# Patient Record
Sex: FEMALE | Race: WHITE | NOT HISPANIC OR LATINO | Employment: FULL TIME | URBAN - METROPOLITAN AREA
[De-identification: names, ages, dates, MRNs, and addresses within clinical notes are randomized per-mention and may not be internally consistent; named-entity substitution may affect disease eponyms.]

---

## 2017-01-31 ENCOUNTER — OFFICE VISIT (OUTPATIENT)
Dept: FAMILY MEDICINE CLINIC | Facility: CLINIC | Age: 42
End: 2017-01-31

## 2017-01-31 VITALS
SYSTOLIC BLOOD PRESSURE: 107 MMHG | WEIGHT: 149 LBS | BODY MASS INDEX: 28.13 KG/M2 | OXYGEN SATURATION: 99 % | TEMPERATURE: 98.7 F | RESPIRATION RATE: 16 BRPM | HEART RATE: 79 BPM | DIASTOLIC BLOOD PRESSURE: 68 MMHG | HEIGHT: 61 IN

## 2017-01-31 DIAGNOSIS — J32.0 MAXILLARY SINUSITIS, UNSPECIFIED CHRONICITY: ICD-10-CM

## 2017-01-31 DIAGNOSIS — R05.9 COUGH: ICD-10-CM

## 2017-01-31 DIAGNOSIS — J06.9 ACUTE URI: Primary | ICD-10-CM

## 2017-01-31 PROCEDURE — 99213 OFFICE O/P EST LOW 20 MIN: CPT | Performed by: FAMILY MEDICINE

## 2017-01-31 RX ORDER — ALCLOMETASONE DIPROPIONATE 0.5 MG/G
CREAM TOPICAL
Refills: 2 | COMMUNITY
Start: 2016-12-15 | End: 2018-08-08

## 2017-01-31 RX ORDER — AMOXICILLIN 500 MG/1
500 CAPSULE ORAL 3 TIMES DAILY
Qty: 30 CAPSULE | Refills: 0 | Status: SHIPPED | OUTPATIENT
Start: 2017-01-31 | End: 2017-02-10

## 2017-06-15 DIAGNOSIS — I10 ESSENTIAL HYPERTENSION: ICD-10-CM

## 2017-06-15 RX ORDER — LOSARTAN POTASSIUM 100 MG/1
TABLET ORAL
Qty: 90 TABLET | Refills: 3 | OUTPATIENT
Start: 2017-06-15

## 2017-07-17 ENCOUNTER — OFFICE VISIT (OUTPATIENT)
Dept: FAMILY MEDICINE CLINIC | Facility: CLINIC | Age: 42
End: 2017-07-17

## 2017-07-17 VITALS
HEART RATE: 84 BPM | DIASTOLIC BLOOD PRESSURE: 72 MMHG | SYSTOLIC BLOOD PRESSURE: 105 MMHG | HEIGHT: 62 IN | BODY MASS INDEX: 29.44 KG/M2 | TEMPERATURE: 98.5 F | RESPIRATION RATE: 16 BRPM | WEIGHT: 160 LBS

## 2017-07-17 DIAGNOSIS — F41.9 ANXIETY: Primary | ICD-10-CM

## 2017-07-17 DIAGNOSIS — I10 ESSENTIAL HYPERTENSION: ICD-10-CM

## 2017-07-17 PROCEDURE — 99213 OFFICE O/P EST LOW 20 MIN: CPT | Performed by: FAMILY MEDICINE

## 2017-07-17 RX ORDER — DULOXETIN HYDROCHLORIDE 30 MG/1
30 CAPSULE, DELAYED RELEASE ORAL DAILY
Qty: 90 CAPSULE | Refills: 3 | Status: SHIPPED | OUTPATIENT
Start: 2017-07-17 | End: 2018-07-20 | Stop reason: SDUPTHER

## 2017-07-17 RX ORDER — BUSPIRONE HYDROCHLORIDE 15 MG/1
15 TABLET ORAL 3 TIMES DAILY
COMMUNITY
End: 2017-07-17 | Stop reason: SDUPTHER

## 2017-07-17 RX ORDER — LOSARTAN POTASSIUM 100 MG/1
100 TABLET ORAL DAILY
Qty: 90 TABLET | Refills: 3 | Status: SHIPPED | OUTPATIENT
Start: 2017-07-17 | End: 2018-08-08 | Stop reason: SDUPTHER

## 2017-07-17 RX ORDER — BUSPIRONE HYDROCHLORIDE 15 MG/1
15 TABLET ORAL 3 TIMES DAILY
Qty: 270 TABLET | Refills: 3 | Status: SHIPPED | OUTPATIENT
Start: 2017-07-17 | End: 2018-08-08 | Stop reason: SDUPTHER

## 2017-07-17 NOTE — PROGRESS NOTES
"Subjective   Roxy Calvert is a 41 y.o. female.     History of Present Illness     Chief Complaint:   Chief Complaint   Patient presents with   • Hypertension     MED REFILL - PHQ9 DONE    • Anxiety       Roxy Calvert 41 y.o. female who presents today for Medical Management of the below listed issues and medication refills.  she has a history of   Patient Active Problem List   Diagnosis   • Hypertension   • Anxiety   .  Since the last visit, she has overall felt well.  she has been compliant with   Current Outpatient Prescriptions:   •  busPIRone (BUSPAR) 15 MG tablet, Take 1 tablet by mouth 3 (Three) Times a Day., Disp: 270 tablet, Rfl: 3  •  DULoxetine (CYMBALTA) 30 MG capsule, Take 1 capsule by mouth Daily., Disp: 90 capsule, Rfl: 3  •  losartan (COZAAR) 100 MG tablet, Take 1 tablet by mouth Daily., Disp: 90 tablet, Rfl: 3  •  alclomethasone (ACLOVATE) 0.05 % cream, APPLY TO AFFECTED AREAS OF THE FACE TWICE DAILY FOR 2-3 DAYS, REPEAT AS NEEDED FOR FLARES, Disp: , Rfl: 2  •  EPIPEN 2-AMANDA 0.3 MG/0.3ML solution auto-injector injection, , Disp: , Rfl: .  she denies medication side effects.    All of the chronic condition(s) listed above are stable w/o issues.    /72  Pulse 84  Temp 98.5 °F (36.9 °C) (Oral)   Resp 16  Ht 62\" (157.5 cm)  Wt 160 lb (72.6 kg)  BMI 29.26 kg/m2    Results for orders placed or performed in visit on 10/03/16   PapIG, HPV, Rfx 16 / 18   Result Value Ref Range    Diagnosis Comment     Specimen adequacy: Comment     Clinician Provided ICD-10: Comment     Performed by: Comment     . .     Note: Comment     Method: Comment     HPV, high-risk Negative Negative         The following portions of the patient's history were reviewed and updated as appropriate: allergies, current medications, past family history, past medical history, past social history, past surgical history and problem list.    Review of Systems   Constitutional: Negative for activity change, chills, fatigue and " fever.   Respiratory: Negative for cough and chest tightness.    Cardiovascular: Negative for chest pain and palpitations.   Gastrointestinal: Negative for abdominal pain and nausea.   Endocrine: Negative for cold intolerance.   Psychiatric/Behavioral: Negative for behavioral problems and dysphoric mood.       Objective   Physical Exam   Constitutional: She appears well-developed and well-nourished.   Neck: Neck supple. No thyromegaly present.   Cardiovascular: Normal rate and regular rhythm.    No murmur heard.  Pulmonary/Chest: Effort normal and breath sounds normal.   Abdominal: Bowel sounds are normal.   Psychiatric: She has a normal mood and affect. Her behavior is normal.   Nursing note and vitals reviewed.      Assessment/Plan   Roxy was seen today for hypertension and anxiety.    Diagnoses and all orders for this visit:    Anxiety  -     busPIRone (BUSPAR) 15 MG tablet; Take 1 tablet by mouth 3 (Three) Times a Day.  -     DULoxetine (CYMBALTA) 30 MG capsule; Take 1 capsule by mouth Daily.    Essential hypertension  -     losartan (COZAAR) 100 MG tablet; Take 1 tablet by mouth Daily.  -     Comprehensive metabolic panel  -     Lipid panel  -     CBC and Differential  -     TSH

## 2017-10-01 LAB
ALBUMIN SERPL-MCNC: 4.4 G/DL (ref 3.5–5.5)
ALBUMIN/GLOB SERPL: 2 {RATIO} (ref 1.2–2.2)
ALP SERPL-CCNC: 70 IU/L (ref 39–117)
ALT SERPL-CCNC: 13 IU/L (ref 0–32)
AST SERPL-CCNC: 15 IU/L (ref 0–40)
BASOPHILS # BLD AUTO: 0 X10E3/UL (ref 0–0.2)
BASOPHILS NFR BLD AUTO: 1 %
BILIRUB SERPL-MCNC: 0.5 MG/DL (ref 0–1.2)
BUN SERPL-MCNC: 10 MG/DL (ref 6–24)
BUN/CREAT SERPL: 14 (ref 9–23)
CALCIUM SERPL-MCNC: 9.4 MG/DL (ref 8.7–10.2)
CHLORIDE SERPL-SCNC: 98 MMOL/L (ref 96–106)
CHOLEST SERPL-MCNC: 183 MG/DL (ref 100–199)
CO2 SERPL-SCNC: 27 MMOL/L (ref 18–29)
CREAT SERPL-MCNC: 0.7 MG/DL (ref 0.57–1)
EOSINOPHIL # BLD AUTO: 0.2 X10E3/UL (ref 0–0.4)
EOSINOPHIL NFR BLD AUTO: 4 %
ERYTHROCYTE [DISTWIDTH] IN BLOOD BY AUTOMATED COUNT: 13.1 % (ref 12.3–15.4)
GLOBULIN SER CALC-MCNC: 2.2 G/DL (ref 1.5–4.5)
GLUCOSE SERPL-MCNC: 89 MG/DL (ref 65–99)
HCT VFR BLD AUTO: 40.4 % (ref 34–46.6)
HDLC SERPL-MCNC: 65 MG/DL
HGB BLD-MCNC: 13.1 G/DL (ref 11.1–15.9)
IMM GRANULOCYTES # BLD: 0 X10E3/UL (ref 0–0.1)
IMM GRANULOCYTES NFR BLD: 0 %
LDLC SERPL CALC-MCNC: 99 MG/DL (ref 0–99)
LYMPHOCYTES # BLD AUTO: 2.6 X10E3/UL (ref 0.7–3.1)
LYMPHOCYTES NFR BLD AUTO: 43 %
MCH RBC QN AUTO: 29.3 PG (ref 26.6–33)
MCHC RBC AUTO-ENTMCNC: 32.4 G/DL (ref 31.5–35.7)
MCV RBC AUTO: 90 FL (ref 79–97)
MONOCYTES # BLD AUTO: 0.4 X10E3/UL (ref 0.1–0.9)
MONOCYTES NFR BLD AUTO: 8 %
NEUTROPHILS # BLD AUTO: 2.6 X10E3/UL (ref 1.4–7)
NEUTROPHILS NFR BLD AUTO: 44 %
PLATELET # BLD AUTO: 363 X10E3/UL (ref 150–379)
POTASSIUM SERPL-SCNC: 4 MMOL/L (ref 3.5–5.2)
PROT SERPL-MCNC: 6.6 G/DL (ref 6–8.5)
RBC # BLD AUTO: 4.47 X10E6/UL (ref 3.77–5.28)
SODIUM SERPL-SCNC: 138 MMOL/L (ref 134–144)
TRIGL SERPL-MCNC: 94 MG/DL (ref 0–149)
TSH SERPL DL<=0.005 MIU/L-ACNC: 1.45 UIU/ML (ref 0.45–4.5)
VLDLC SERPL CALC-MCNC: 19 MG/DL (ref 5–40)
WBC # BLD AUTO: 5.9 X10E3/UL (ref 3.4–10.8)

## 2017-10-03 ENCOUNTER — OFFICE VISIT (OUTPATIENT)
Dept: FAMILY MEDICINE CLINIC | Facility: CLINIC | Age: 42
End: 2017-10-03

## 2017-10-03 VITALS
WEIGHT: 163 LBS | BODY MASS INDEX: 30 KG/M2 | HEIGHT: 62 IN | SYSTOLIC BLOOD PRESSURE: 111 MMHG | TEMPERATURE: 98.3 F | HEART RATE: 98 BPM | DIASTOLIC BLOOD PRESSURE: 66 MMHG | RESPIRATION RATE: 18 BRPM

## 2017-10-03 DIAGNOSIS — Z00.00 ANNUAL PHYSICAL EXAM: Primary | ICD-10-CM

## 2017-10-03 PROCEDURE — 99396 PREV VISIT EST AGE 40-64: CPT | Performed by: FAMILY MEDICINE

## 2017-10-03 NOTE — PROGRESS NOTES
"Subjective   Roxy Calvert is a 41 y.o. female.     CC: Annual Exam    History of Present Illness     Roxy Calvert 41 y.o. female who presents for an Annual Wellness Visit.  she has a history of   Patient Active Problem List   Diagnosis   • Hypertension   • Anxiety   .  she has been feeling well.  Labs results discussed in detail with the patient.  Plan to update vaccines if needed today.  I  reviewed health maintenance with her as part of my preventative care plan.    Health Habits:  Dental Exam. up to date  Eye Exam. up to date  Exercise: 3 times/week.  Current exercise activities include: eliptical      The following portions of the patient's history were reviewed and updated as appropriate: allergies, current medications, past family history, past medical history, past social history, past surgical history and problem list.    Review of Systems   Constitutional: Negative for appetite change, fever and unexpected weight change.   HENT: Negative for ear pain, facial swelling and sore throat.    Eyes: Negative for pain and visual disturbance.   Respiratory: Negative for chest tightness, shortness of breath and wheezing.    Cardiovascular: Negative for chest pain and palpitations.   Gastrointestinal: Negative for abdominal pain and blood in stool.   Endocrine: Negative.    Genitourinary: Negative for difficulty urinating and hematuria.   Musculoskeletal: Negative for joint swelling.   Neurological: Negative for tremors, seizures and syncope.   Hematological: Negative for adenopathy.   Psychiatric/Behavioral: Negative.      /66  Pulse 98  Temp 98.3 °F (36.8 °C) (Oral)   Resp 18  Ht 61.5\" (156.2 cm)  Wt 163 lb (73.9 kg)  BMI 30.3 kg/m2    Objective   Physical Exam   Constitutional: She is oriented to person, place, and time. She appears well-developed and well-nourished.   HENT:   Head: Normocephalic and atraumatic.   Right Ear: External ear normal.   Left Ear: External ear normal.   Mouth/Throat: " No oropharyngeal exudate.   Eyes: Conjunctivae and EOM are normal. Pupils are equal, round, and reactive to light. No scleral icterus.   Neck: Normal range of motion. Neck supple. No thyromegaly present.   Cardiovascular: Normal rate and regular rhythm.  Exam reveals no gallop.    No murmur heard.  Pulmonary/Chest: Effort normal and breath sounds normal. She has no wheezes. She has no rales.   Abdominal: Soft. Bowel sounds are normal. She exhibits no distension and no mass. There is no tenderness. No hernia.   Musculoskeletal: Normal range of motion. She exhibits no edema, tenderness or deformity.   Lymphadenopathy:     She has no cervical adenopathy.   Neurological: She is alert and oriented to person, place, and time. She has normal reflexes.   Skin: Skin is warm and dry. No rash noted.   Psychiatric: She has a normal mood and affect. Her behavior is normal.   Nursing note and vitals reviewed.  Labs reviewed with pt today during visit. All questions answered.  Leanna Swanson NP student, present for exam.    Assessment/Plan   Roxy was seen today for annual exam and lab results.    Diagnoses and all orders for this visit:    Annual physical exam    Diet/exercise discussed.

## 2017-10-30 ENCOUNTER — OFFICE VISIT (OUTPATIENT)
Dept: OBSTETRICS AND GYNECOLOGY | Age: 42
End: 2017-10-30

## 2017-10-30 VITALS
SYSTOLIC BLOOD PRESSURE: 132 MMHG | BODY MASS INDEX: 27.49 KG/M2 | DIASTOLIC BLOOD PRESSURE: 82 MMHG | WEIGHT: 165 LBS | HEIGHT: 65 IN

## 2017-10-30 DIAGNOSIS — Z01.419 ENCOUNTER FOR GYNECOLOGICAL EXAMINATION WITHOUT ABNORMAL FINDING: Primary | ICD-10-CM

## 2017-10-30 PROCEDURE — 99396 PREV VISIT EST AGE 40-64: CPT | Performed by: OBSTETRICS & GYNECOLOGY

## 2017-10-30 NOTE — PROGRESS NOTES
"Routine Annual Visit    10/30/2017    Patient: Roxy Calvert          MR#:[id number]      Chief Complaint   Patient presents with   • Annual Exam     PT HERE FOR ROUTINE AE, R/S MG DUE TO KATI BEING OUT. SHE IS WELL WITH NO COMPLAINTS. LAST PAP  (NEG AND NEG HPV).       History of Present Illness    41 y.o. female  who presents for annual exam.   Doing well  No menses with mirena and loves it- due out 2019- plans to get another  Kids 8,11 Berta and storm trooper for Halloween  Will need to sched mammo as tech out today  UTD on pap          No LMP recorded. Patient has had an implant.  Obstetric History:  OB History      Para Term  AB Living    3 2 1 1  2    SAB TAB Ectopic Multiple Live Births        2         Menstrual History:     No LMP recorded. Patient has had an implant.       Sexual History:       ________________________________________  Patient Active Problem List   Diagnosis   • Hypertension   • Anxiety       Past Medical History:   Diagnosis Date   • Allergic rhinitis    • Anxiety    • H/O bone density study never   • H/O complete eye exam    • Hypertension    • Hypertension in pregnancy    • Vaginal delivery     08 DR TAN BABY \"KYDWYN\" 6.12       Past Surgical History:   Procedure Laterality Date   •  SECTION  2006   • EAR TUBES     • MAMMO BILATERAL      age 36 scheduled 2017   • PAP SMEAR  2016    gyn dr carli tan due 2017   • TYMPANOPLASTY     • WISDOM TOOTH EXTRACTION         History   Smoking Status   • Never Smoker   Smokeless Tobacco   • Never Used       has a current medication list which includes the following prescription(s): alclomethasone, buspirone, duloxetine, epipen 2-kate, and losartan.  ________________________________________    Current contraception: IUD  History of abnormal Pap smear: no  Family history of Breast cancer: no  Family history of uterine or ovarian cancer: no  Family History of colon cancer/colon " "polyps: no  History of abnormal mammogram: no      The following portions of the patient's history were reviewed and updated as appropriate: allergies, current medications, past family history, past medical history, past social history, past surgical history and problem list.    Review of Systems    Pertinent items are noted in HPI.     Objective   Physical Exam    /82  Ht 65\" (165.1 cm)  Wt 165 lb (74.8 kg)  LMP Comment: IUD  BMI 27.46 kg/m2   BP Readings from Last 3 Encounters:   10/30/17 132/82   10/03/17 111/66   07/17/17 105/72      Wt Readings from Last 3 Encounters:   10/30/17 165 lb (74.8 kg)   10/03/17 163 lb (73.9 kg)   07/17/17 160 lb (72.6 kg)      BMI: Estimated body mass index is 27.46 kg/(m^2) as calculated from the following:    Height as of this encounter: 65\" (165.1 cm).    Weight as of this encounter: 165 lb (74.8 kg).      General:   alert, appears stated age and cooperative   Abdomen: soft, non-tender, without masses or organomegaly   Breast: inspection negative, no nipple discharge or bleeding, no masses or nodularity palpable   Vulva: normal   Vagina: normal mucosa   Cervix: no cervical motion tenderness and no lesions   Uterus: normal size, mobile or non-tender   Adnexa: no mass, fullness, tenderness     Assessment:    1. Normal annual exam   Assessment     ICD-10-CM ICD-9-CM   1. Encounter for gynecological examination without abnormal finding Z01.419 V72.31     Plan:    Plan     [x]  Mammogram request made  []  PAP done  []  Labs:   []  GC/Chl/TV  []  DEXA scan   []  Referral for colonoscopy:       Roxy was seen today for annual exam.    Diagnoses and all orders for this visit:    Encounter for gynecological examination without abnormal finding          Counseling:  --Nutrition: Stressed importance of moderation and caloric balance, stressed fresh fruit and vegetables  --Exercise: Stressed the importance of regular exercise. 3-5 times weekly   - Discussed screening mammogram " recommendations.   --Discussed benefits of screening colonoscopy- age 50 unless FH  --Discussed pap smear screening recommendations

## 2017-10-31 ENCOUNTER — OFFICE VISIT (OUTPATIENT)
Dept: FAMILY MEDICINE CLINIC | Facility: CLINIC | Age: 42
End: 2017-10-31

## 2017-10-31 VITALS
OXYGEN SATURATION: 97 % | HEIGHT: 65 IN | SYSTOLIC BLOOD PRESSURE: 127 MMHG | RESPIRATION RATE: 18 BRPM | WEIGHT: 166 LBS | DIASTOLIC BLOOD PRESSURE: 83 MMHG | BODY MASS INDEX: 27.66 KG/M2 | HEART RATE: 75 BPM | TEMPERATURE: 98.7 F

## 2017-10-31 DIAGNOSIS — H60.502 ACUTE OTITIS EXTERNA OF LEFT EAR, UNSPECIFIED TYPE: Primary | ICD-10-CM

## 2017-10-31 PROCEDURE — 99213 OFFICE O/P EST LOW 20 MIN: CPT | Performed by: NURSE PRACTITIONER

## 2017-10-31 RX ORDER — ACETIC ACID 20.65 MG/ML
3 SOLUTION AURICULAR (OTIC) 3 TIMES DAILY
Qty: 15 ML | Refills: 0 | Status: SHIPPED | OUTPATIENT
Start: 2017-10-31 | End: 2018-10-30

## 2017-10-31 NOTE — PROGRESS NOTES
Subjective   Roxy Calvert is a 41 y.o. female.     History of Present Illness   Roxy Calvert 41 y.o. female who presents for evaluation of ear pressure. Symptoms include ear pressure and foul smelling drainage from left ear.  Onset of symptoms was a few days ago, gradually worsening since that time. Patient denies shortness of breath, wheezing, upper respiratory congestion, fever, ear drainage, vertigo, chills, sinus pain.   Evaluation to date: none Treatment to date:  none.         The following portions of the patient's history were reviewed and updated as appropriate: allergies, current medications, past family history, past medical history, past social history, past surgical history and problem list.    Review of Systems   Constitutional: Negative for chills and fever.   HENT: Positive for ear pain. Negative for congestion, ear discharge, hearing loss, postnasal drip, rhinorrhea, sinus pressure and sore throat.    Respiratory: Negative for cough and shortness of breath.        Objective   Physical Exam   Constitutional: She is oriented to person, place, and time. She appears well-developed and well-nourished.   HENT:   Right Ear: External ear and ear canal normal.   Left Ear: External ear normal. There is drainage.   Right cerumen impaction.  Left canal with moderate amount of purulent drainage.  Tenderness to palpation of left tragus.    Neck: Carotid bruit is not present. No thyroid mass present.   Pulmonary/Chest: Effort normal.   Neurological: She is alert and oriented to person, place, and time.   Psychiatric: She has a normal mood and affect. Judgment normal.   Nursing note and vitals reviewed.      Assessment/Plan   Roxy was seen today for earache.    Diagnoses and all orders for this visit:    Acute otitis externa of left ear, unspecified type  -     acetic acid (VOSOL) 2 % otic solution; Administer 3 drops into the left ear 3 (Three) Times a Day.  -     Anaerobic & Aerobic Culture ([facility]  Only) - Swab, Ear, Left          Patient to use OTC wax softening drops to right ear.  Will use acetic acid to left ear.   Culture obtained from left ear.

## 2017-11-03 ENCOUNTER — TELEPHONE (OUTPATIENT)
Dept: FAMILY MEDICINE CLINIC | Facility: CLINIC | Age: 42
End: 2017-11-03

## 2017-11-04 LAB
BACTERIA SPEC RESP CULT: ABNORMAL
BACTERIA SPEC RESP CULT: ABNORMAL
OTHER ANTIBIOTIC SUSC ISLT: ABNORMAL
REQUEST PROBLEM: NORMAL

## 2017-11-06 ENCOUNTER — TELEPHONE (OUTPATIENT)
Dept: FAMILY MEDICINE CLINIC | Facility: CLINIC | Age: 42
End: 2017-11-06

## 2017-11-06 NOTE — TELEPHONE ENCOUNTER
----- Message from MAGDA Orozco sent at 11/6/2017  5:39 PM EST -----  Based on culture results, changed acetic acid to cipro drops x 7 days.  Patient to stop acetic acid.

## 2018-07-20 DIAGNOSIS — I10 ESSENTIAL HYPERTENSION: ICD-10-CM

## 2018-07-20 DIAGNOSIS — F41.9 ANXIETY: ICD-10-CM

## 2018-07-20 RX ORDER — DULOXETIN HYDROCHLORIDE 30 MG/1
30 CAPSULE, DELAYED RELEASE ORAL DAILY
Qty: 90 CAPSULE | Refills: 3 | OUTPATIENT
Start: 2018-07-20

## 2018-07-20 RX ORDER — DULOXETIN HYDROCHLORIDE 30 MG/1
30 CAPSULE, DELAYED RELEASE ORAL DAILY
Qty: 90 CAPSULE | Refills: 0 | Status: SHIPPED | OUTPATIENT
Start: 2018-07-20 | End: 2018-08-08 | Stop reason: SDUPTHER

## 2018-07-20 RX ORDER — LOSARTAN POTASSIUM 100 MG/1
100 TABLET ORAL DAILY
Qty: 90 TABLET | Refills: 3 | OUTPATIENT
Start: 2018-07-20

## 2018-08-08 ENCOUNTER — OFFICE VISIT (OUTPATIENT)
Dept: FAMILY MEDICINE CLINIC | Facility: CLINIC | Age: 43
End: 2018-08-08

## 2018-08-08 VITALS
DIASTOLIC BLOOD PRESSURE: 86 MMHG | SYSTOLIC BLOOD PRESSURE: 133 MMHG | WEIGHT: 177 LBS | BODY MASS INDEX: 32.57 KG/M2 | HEIGHT: 62 IN | RESPIRATION RATE: 16 BRPM | HEART RATE: 76 BPM | TEMPERATURE: 98.6 F

## 2018-08-08 DIAGNOSIS — I10 ESSENTIAL HYPERTENSION: Primary | ICD-10-CM

## 2018-08-08 DIAGNOSIS — F41.9 ANXIETY: ICD-10-CM

## 2018-08-08 PROCEDURE — 99213 OFFICE O/P EST LOW 20 MIN: CPT | Performed by: FAMILY MEDICINE

## 2018-08-08 RX ORDER — DULOXETIN HYDROCHLORIDE 30 MG/1
30 CAPSULE, DELAYED RELEASE ORAL DAILY
Qty: 90 CAPSULE | Refills: 3 | Status: SHIPPED | OUTPATIENT
Start: 2018-08-08 | End: 2019-07-29

## 2018-08-08 RX ORDER — BUSPIRONE HYDROCHLORIDE 15 MG/1
15 TABLET ORAL 3 TIMES DAILY
Qty: 270 TABLET | Refills: 3 | Status: SHIPPED | OUTPATIENT
Start: 2018-08-08 | End: 2019-07-15 | Stop reason: SDUPTHER

## 2018-08-08 RX ORDER — LOSARTAN POTASSIUM 100 MG/1
100 TABLET ORAL DAILY
Qty: 90 TABLET | Refills: 3 | Status: SHIPPED | OUTPATIENT
Start: 2018-08-08 | End: 2019-07-18

## 2018-08-08 NOTE — PROGRESS NOTES
"Subjective   Roxy Calvert is a 42 y.o. female.     History of Present Illness     Chief Complaint:   Chief Complaint   Patient presents with   • Hypertension     med refill    • Anxiety       Roxy Calvert 42 y.o. female who presents today for Medical Management of the below listed issues and medication refills.  she has a problem list of   Patient Active Problem List   Diagnosis   • Hypertension   • Anxiety   .  Since the last visit, she has overall felt well.  she has been compliant with   Current Outpatient Prescriptions:   •  acetic acid (VOSOL) 2 % otic solution, Administer 3 drops into the left ear 3 (Three) Times a Day., Disp: 15 mL, Rfl: 0  •  busPIRone (BUSPAR) 15 MG tablet, Take 1 tablet by mouth 3 (Three) Times a Day., Disp: 270 tablet, Rfl: 3  •  DULoxetine (CYMBALTA) 30 MG capsule, Take 1 capsule by mouth Daily., Disp: 90 capsule, Rfl: 3  •  EPIPEN 2-AMANDA 0.3 MG/0.3ML solution auto-injector injection, , Disp: , Rfl:   •  losartan (COZAAR) 100 MG tablet, Take 1 tablet by mouth Daily., Disp: 90 tablet, Rfl: 3.  she denies medication side effects.    All of the chronic condition(s) listed above are stable w/o issues.    /86   Pulse 76   Temp 98.6 °F (37 °C) (Oral)   Resp 16   Ht 156.2 cm (61.5\")   Wt 80.3 kg (177 lb)   BMI 32.90 kg/m²     Results for orders placed or performed in visit on 10/31/17   Throat / Upper Respiratory Culture   Result Value Ref Range    Upper Respiratory Culture Final report (A)     Result 1 Staphylococcus aureus (A)     Susceptibility Testing Comment    Request Problem   Result Value Ref Range    Request Problem Final report            The following portions of the patient's history were reviewed and updated as appropriate: allergies, current medications, past family history, past medical history, past social history, past surgical history and problem list.    Review of Systems   Constitutional: Negative for activity change, chills, fatigue and fever. "   Respiratory: Negative for cough and chest tightness.    Cardiovascular: Negative for chest pain and palpitations.   Gastrointestinal: Negative for abdominal pain and nausea.   Endocrine: Negative for cold intolerance.   Psychiatric/Behavioral: Negative for behavioral problems and dysphoric mood.       Objective   Physical Exam   Constitutional: She appears well-developed and well-nourished.   Neck: Neck supple. No thyromegaly present.   Cardiovascular: Normal rate and regular rhythm.    No murmur heard.  Pulmonary/Chest: Effort normal and breath sounds normal.   Abdominal: Bowel sounds are normal. There is no tenderness.   Neurological: She is alert.   Psychiatric: She has a normal mood and affect. Her behavior is normal.   Nursing note and vitals reviewed.      Assessment/Plan   Roxy was seen today for hypertension and anxiety.    Diagnoses and all orders for this visit:    Essential hypertension  -     losartan (COZAAR) 100 MG tablet; Take 1 tablet by mouth Daily.  -     Comprehensive metabolic panel  -     Lipid panel  -     CBC and Differential  -     TSH    Anxiety  -     DULoxetine (CYMBALTA) 30 MG capsule; Take 1 capsule by mouth Daily.  -     busPIRone (BUSPAR) 15 MG tablet; Take 1 tablet by mouth 3 (Three) Times a Day.

## 2018-10-21 LAB
ALBUMIN SERPL-MCNC: 4.3 G/DL (ref 3.5–5.5)
ALBUMIN/GLOB SERPL: 1.7 {RATIO} (ref 1.2–2.2)
ALP SERPL-CCNC: 74 IU/L (ref 39–117)
ALT SERPL-CCNC: 18 IU/L (ref 0–32)
AST SERPL-CCNC: 20 IU/L (ref 0–40)
BASOPHILS # BLD AUTO: 0.1 X10E3/UL (ref 0–0.2)
BASOPHILS NFR BLD AUTO: 1 %
BILIRUB SERPL-MCNC: 0.4 MG/DL (ref 0–1.2)
BUN SERPL-MCNC: 9 MG/DL (ref 6–24)
BUN/CREAT SERPL: 13 (ref 9–23)
CALCIUM SERPL-MCNC: 9.2 MG/DL (ref 8.7–10.2)
CHLORIDE SERPL-SCNC: 100 MMOL/L (ref 96–106)
CHOLEST SERPL-MCNC: 172 MG/DL (ref 100–199)
CO2 SERPL-SCNC: 24 MMOL/L (ref 20–29)
CREAT SERPL-MCNC: 0.72 MG/DL (ref 0.57–1)
EOSINOPHIL # BLD AUTO: 0.2 X10E3/UL (ref 0–0.4)
EOSINOPHIL NFR BLD AUTO: 2 %
ERYTHROCYTE [DISTWIDTH] IN BLOOD BY AUTOMATED COUNT: 13.4 % (ref 12.3–15.4)
GLOBULIN SER CALC-MCNC: 2.6 G/DL (ref 1.5–4.5)
GLUCOSE SERPL-MCNC: 92 MG/DL (ref 65–99)
HCT VFR BLD AUTO: 38.7 % (ref 34–46.6)
HDLC SERPL-MCNC: 58 MG/DL
HGB BLD-MCNC: 13.4 G/DL (ref 11.1–15.9)
IMM GRANULOCYTES # BLD: 0 X10E3/UL (ref 0–0.1)
IMM GRANULOCYTES NFR BLD: 0 %
LDLC SERPL CALC-MCNC: 97 MG/DL (ref 0–99)
LYMPHOCYTES # BLD AUTO: 4 X10E3/UL (ref 0.7–3.1)
LYMPHOCYTES NFR BLD AUTO: 47 %
MCH RBC QN AUTO: 29.8 PG (ref 26.6–33)
MCHC RBC AUTO-ENTMCNC: 34.6 G/DL (ref 31.5–35.7)
MCV RBC AUTO: 86 FL (ref 79–97)
MONOCYTES # BLD AUTO: 0.5 X10E3/UL (ref 0.1–0.9)
MONOCYTES NFR BLD AUTO: 7 %
NEUTROPHILS # BLD AUTO: 3.6 X10E3/UL (ref 1.4–7)
NEUTROPHILS NFR BLD AUTO: 43 %
PLATELET # BLD AUTO: 382 X10E3/UL (ref 150–379)
POTASSIUM SERPL-SCNC: 4.3 MMOL/L (ref 3.5–5.2)
PROT SERPL-MCNC: 6.9 G/DL (ref 6–8.5)
RBC # BLD AUTO: 4.5 X10E6/UL (ref 3.77–5.28)
SODIUM SERPL-SCNC: 139 MMOL/L (ref 134–144)
TRIGL SERPL-MCNC: 87 MG/DL (ref 0–149)
TSH SERPL DL<=0.005 MIU/L-ACNC: 1.57 UIU/ML (ref 0.45–4.5)
VLDLC SERPL CALC-MCNC: 17 MG/DL (ref 5–40)
WBC # BLD AUTO: 8.4 X10E3/UL (ref 3.4–10.8)

## 2018-10-30 ENCOUNTER — OFFICE VISIT (OUTPATIENT)
Dept: FAMILY MEDICINE CLINIC | Facility: CLINIC | Age: 43
End: 2018-10-30

## 2018-10-30 VITALS
WEIGHT: 167 LBS | TEMPERATURE: 97.9 F | HEIGHT: 65 IN | DIASTOLIC BLOOD PRESSURE: 78 MMHG | HEART RATE: 74 BPM | RESPIRATION RATE: 16 BRPM | SYSTOLIC BLOOD PRESSURE: 119 MMHG | BODY MASS INDEX: 27.82 KG/M2

## 2018-10-30 DIAGNOSIS — Z00.00 ANNUAL PHYSICAL EXAM: Primary | ICD-10-CM

## 2018-10-30 PROCEDURE — 99396 PREV VISIT EST AGE 40-64: CPT | Performed by: FAMILY MEDICINE

## 2018-10-30 NOTE — PROGRESS NOTES
"Subjective   Roxy Calvert is a 42 y.o. female.     CC: Annual Exam    History of Present Illness     Roxy Calvert 42 y.o. female who presents for an Annual Wellness Visit.  she has a history of   Patient Active Problem List   Diagnosis   • Hypertension   • Anxiety   .  she has been feeling well.  Labs results discussed in detail with the patient.  Plan to update vaccines if needed today.  I  reviewed health maintenance with her as part of my preventative care plan.    Health Habits:  Dental Exam. up to date  Eye Exam. up to date  Exercise: 3 times/week.  Current exercise activities include: running/ jogging      The following portions of the patient's history were reviewed and updated as appropriate: allergies, current medications, past family history, past medical history, past social history, past surgical history and problem list.    Review of Systems   Constitutional: Negative for appetite change, fever and unexpected weight change.   HENT: Negative for ear pain, facial swelling and sore throat.    Eyes: Negative for pain and visual disturbance.   Respiratory: Negative for chest tightness, shortness of breath and wheezing.    Cardiovascular: Negative for chest pain and palpitations.   Gastrointestinal: Negative for abdominal pain and blood in stool.   Endocrine: Negative.    Genitourinary: Negative for difficulty urinating and hematuria.   Musculoskeletal: Negative for joint swelling.   Neurological: Negative for tremors, seizures and syncope.   Hematological: Negative for adenopathy.   Psychiatric/Behavioral: Negative.      /78   Pulse 74   Temp 97.9 °F (36.6 °C) (Oral)   Resp 16   Ht 165.1 cm (65\")   Wt 75.8 kg (167 lb)   BMI 27.79 kg/m²     Objective   Physical Exam   Constitutional: She is oriented to person, place, and time. She appears well-developed and well-nourished.   HENT:   Head: Normocephalic and atraumatic.   Right Ear: External ear normal.   Left Ear: External ear normal. "   Mouth/Throat: No oropharyngeal exudate.   Eyes: Pupils are equal, round, and reactive to light. Conjunctivae and EOM are normal. No scleral icterus.   Neck: Normal range of motion. Neck supple. No thyromegaly present.   Cardiovascular: Normal rate and regular rhythm.  Exam reveals no gallop.    No murmur heard.  Pulmonary/Chest: Effort normal and breath sounds normal. She has no wheezes. She has no rales.   Abdominal: Soft. Bowel sounds are normal. She exhibits no distension and no mass. There is no tenderness. No hernia.   Musculoskeletal: Normal range of motion. She exhibits no edema, tenderness or deformity.   Lymphadenopathy:     She has no cervical adenopathy.   Neurological: She is alert and oriented to person, place, and time. She has normal reflexes.   Skin: Skin is warm and dry. No rash noted.   Psychiatric: She has a normal mood and affect. Her behavior is normal.   Nursing note and vitals reviewed.  Labs reviewed with pt today during visit. All questions answered.      Assessment/Plan   Roxy was seen today for annual exam.    Diagnoses and all orders for this visit:    Annual physical exam    Diet/exercise discussed.

## 2018-12-17 ENCOUNTER — OFFICE VISIT (OUTPATIENT)
Dept: OBSTETRICS AND GYNECOLOGY | Age: 43
End: 2018-12-17

## 2018-12-17 ENCOUNTER — PROCEDURE VISIT (OUTPATIENT)
Dept: OBSTETRICS AND GYNECOLOGY | Age: 43
End: 2018-12-17

## 2018-12-17 VITALS
SYSTOLIC BLOOD PRESSURE: 102 MMHG | DIASTOLIC BLOOD PRESSURE: 60 MMHG | BODY MASS INDEX: 27.32 KG/M2 | HEIGHT: 65 IN | WEIGHT: 164 LBS

## 2018-12-17 DIAGNOSIS — Z30.431 IUD CHECK UP: Primary | ICD-10-CM

## 2018-12-17 DIAGNOSIS — Z30.433 ENCOUNTER FOR REMOVAL AND REINSERTION OF IUD: Primary | ICD-10-CM

## 2018-12-17 LAB
B-HCG UR QL: NEGATIVE
INTERNAL NEGATIVE CONTROL: NEGATIVE
INTERNAL POSITIVE CONTROL: POSITIVE
Lab: NORMAL

## 2018-12-17 PROCEDURE — 76830 TRANSVAGINAL US NON-OB: CPT | Performed by: OBSTETRICS & GYNECOLOGY

## 2018-12-17 PROCEDURE — 58300 INSERT INTRAUTERINE DEVICE: CPT | Performed by: OBSTETRICS & GYNECOLOGY

## 2018-12-17 PROCEDURE — 58301 REMOVE INTRAUTERINE DEVICE: CPT | Performed by: OBSTETRICS & GYNECOLOGY

## 2018-12-17 PROCEDURE — 81025 URINE PREGNANCY TEST: CPT | Performed by: OBSTETRICS & GYNECOLOGY

## 2018-12-17 NOTE — PROGRESS NOTES
"GYN Visit    2018    Patient: Roxy Calvert          MR#:[id number]      Chief Complaint   Patient presents with   • Contraception     PT HERE FOR MIRENA REMOVAL AND RE-INSERTION. SHE IS WELL WITH NO COMPLAINTS.        History of Present Illness    43 y.o. female  who presents for  Removal and reinsert of mirena  No issues, due for AE and mammogram soon        No LMP recorded. Patient has had an implant.    ________________________________________  Patient Active Problem List   Diagnosis   • Hypertension   • Anxiety       Past Medical History:   Diagnosis Date   • Allergic rhinitis    • Anxiety    • H/O bone density study never   • H/O complete eye exam 2008    dr qureshi   • Hypertension    • Hypertension in pregnancy    • Vaginal delivery     08 DR MUÑOZ BABY \"KYDWYN\" 6.12       Past Surgical History:   Procedure Laterality Date   •  SECTION  2006   • EAR TUBES     • MAMMO BILATERAL      age 36 scheduled 2017   • PAP SMEAR      gyn dr carli muñoz due 2017   • TYMPANOPLASTY     • WISDOM TOOTH EXTRACTION         Social History     Tobacco Use   Smoking Status Never Smoker   Smokeless Tobacco Never Used       has a current medication list which includes the following prescription(s): buspirone, duloxetine, epipen 2-kate, and losartan, and the following Facility-Administered Medications: levonorgestrel.  ________________________________________    Current contraception: IUD      The following portions of the patient's history were reviewed and updated as appropriate: allergies, current medications, past family history, past medical history, past social history, past surgical history and problem list.    Review of Systems    Pertinent items are noted in HPI.     Objective   Physical Exam    /60   Ht 165.1 cm (65\")   Wt 74.4 kg (164 lb)   BMI 27.29 kg/m²    BP Readings from Last 3 Encounters:   18 102/60   10/30/18 119/78   18 133/86    " "  Wt Readings from Last 3 Encounters:   12/17/18 74.4 kg (164 lb)   10/30/18 75.8 kg (167 lb)   08/08/18 80.3 kg (177 lb)      BMI: Estimated body mass index is 27.29 kg/m² as calculated from the following:    Height as of this encounter: 165.1 cm (65\").    Weight as of this encounter: 74.4 kg (164 lb).    IUD Removal Procedure Note    Type of IUD:  Mirena  Date of insertion:  unknown  Reason for removal:  Device expiration  Other relevant history/information:  none    Procedure Time Out Documentation      Procedure Details  IUD strings visible:  yes  Local anesthesia:  None  Tenaculum used:  None  Removal:  IUD strings grasped and IUD removed intact with gentle traction.  The patient tolerated the procedure well.    All appropriate instructions regarding removal were reviewed.    Patient tolerated the procedure well without complications.    Plans for contraception:  IUD    Other follow-up needed:  none    The patient was advised to call for any fever or for prolonged or severe pain or bleeding. She was advised to use OTC ibuprofen as needed for mild to moderate pain.     IUD Insertion Procedure Note    Pre-operative Diagnosis: desires IUD    Post-operative Diagnosis: same    Indications: contraception    Procedure Details   Urine pregnancy test was done today and result was negative.  The risks (including infection, bleeding, pain, and uterine perforation) and benefits of the procedure were explained to the patient and Written informed consent was obtained.      Cervix cleansed with Betadine. Uterus sounded to 7 cm. IUD inserted without difficulty. String visible and trimmed.    IUD Information:  Mirena.    Condition:  Stable    Complications:  None  Patient tolerated the procedure well without complications.    Plan:    The patient was advised to call for any fever or for prolonged or severe pain or bleeding. She was advised to use OTC ibuprofen as needed for mild to moderate pain.     Attending Physician " Documentation:  I was present for or participated in the entire procedure, including opening and closing.    Assessment:      Roxy was seen today for contraception.    Diagnoses and all orders for this visit:    Encounter for removal and reinsertion of IUD  -     levonorgestrel (MIRENA) 20 MCG/24HR IUD; by Intrauterine route 1 (One) Time.  -     POC Pregnancy, Urine    US was done to check placement as uterus sounded small just under 7 cm, see report, correct placement documented    Follow up 1 month for AE and IUD check

## 2018-12-19 ENCOUNTER — PROCEDURE VISIT (OUTPATIENT)
Dept: OBSTETRICS AND GYNECOLOGY | Age: 43
End: 2018-12-19

## 2018-12-19 ENCOUNTER — APPOINTMENT (OUTPATIENT)
Dept: WOMENS IMAGING | Facility: HOSPITAL | Age: 43
End: 2018-12-19

## 2018-12-19 DIAGNOSIS — Z12.31 VISIT FOR SCREENING MAMMOGRAM: Primary | ICD-10-CM

## 2018-12-19 PROCEDURE — 77067 SCR MAMMO BI INCL CAD: CPT | Performed by: RADIOLOGY

## 2019-01-30 ENCOUNTER — OFFICE VISIT (OUTPATIENT)
Dept: OBSTETRICS AND GYNECOLOGY | Age: 44
End: 2019-01-30

## 2019-01-30 VITALS
BODY MASS INDEX: 26.49 KG/M2 | SYSTOLIC BLOOD PRESSURE: 110 MMHG | WEIGHT: 159 LBS | HEIGHT: 65 IN | DIASTOLIC BLOOD PRESSURE: 60 MMHG

## 2019-01-30 DIAGNOSIS — Z01.419 ENCOUNTER FOR GYNECOLOGICAL EXAMINATION WITHOUT ABNORMAL FINDING: Primary | ICD-10-CM

## 2019-01-30 DIAGNOSIS — Z12.4 SCREENING FOR CERVICAL CANCER: ICD-10-CM

## 2019-01-30 DIAGNOSIS — Z11.51 SCREENING FOR HPV (HUMAN PAPILLOMAVIRUS): ICD-10-CM

## 2019-01-30 PROCEDURE — 99396 PREV VISIT EST AGE 40-64: CPT | Performed by: OBSTETRICS & GYNECOLOGY

## 2019-01-30 NOTE — PROGRESS NOTES
"Routine Annual Visit    2019    Patient: Roxy Calvert          MR#:[id number]      Chief Complaint   Patient presents with   • Annual Exam     PT HERE FOR ROUTINE AE, SHE HAD MG DONE LAST MONTH. SHE IS WELL WITH NO COMPLAINTS. LAST PAP 10/2016 NEG AND NEG HPV. MIRENA FOR BC.       History of Present Illness    43 y.o. female  who presents for annual exam.   mirena doing well, just put in 1 month ago  Due for pap  mammo already done          No LMP recorded. Patient has had an implant.  Obstetric History:  OB History      Para Term  AB Living    4 3 2 1   2    SAB TAB Ectopic Molar Multiple Live Births              2         Menstrual History:     No LMP recorded. Patient has had an implant.       Sexual History:       ________________________________________  Patient Active Problem List   Diagnosis   • Hypertension   • Anxiety       Past Medical History:   Diagnosis Date   • Allergic rhinitis    • Anxiety    • H/O bone density study never   • H/O complete eye exam 2008    dr qureshi   • Hypertension    • Hypertension in pregnancy    • Vaginal delivery     08 DR MUÑOZ BABY \"KYDWYN\" 6.12       Past Surgical History:   Procedure Laterality Date   •  SECTION  2006   • EAR TUBES     • MAMMO BILATERAL      age 36 scheduled 2017   • PAP SMEAR      gyn dr carli muñoz due 2017   • TYMPANOPLASTY     • WISDOM TOOTH EXTRACTION         Social History     Tobacco Use   Smoking Status Never Smoker   Smokeless Tobacco Never Used       has a current medication list which includes the following prescription(s): buspirone, duloxetine, epipen 2-kate, and losartan.  ________________________________________    Current contraception: IUD  History of abnormal Pap smear: no  Family history of Breast cancer: great aunt  Family history of uterine or ovarian cancer: no  Family History of colon cancer/colon polyps: no  History of abnormal mammogram: no      The following " "portions of the patient's history were reviewed and updated as appropriate: allergies, current medications, past family history, past medical history, past social history, past surgical history and problem list.    Review of Systems    Pertinent items are noted in HPI.     Objective   Physical Exam    /60   Ht 165.1 cm (65\")   Wt 72.1 kg (159 lb)   BMI 26.46 kg/m²    BP Readings from Last 3 Encounters:   01/30/19 110/60   12/17/18 102/60   10/30/18 119/78      Wt Readings from Last 3 Encounters:   01/30/19 72.1 kg (159 lb)   12/17/18 74.4 kg (164 lb)   10/30/18 75.8 kg (167 lb)      BMI: Estimated body mass index is 26.46 kg/m² as calculated from the following:    Height as of this encounter: 165.1 cm (65\").    Weight as of this encounter: 72.1 kg (159 lb).      General:   alert, appears stated age and cooperative   Abdomen: soft, non-tender, without masses or organomegaly   Breast: inspection negative, no nipple discharge or bleeding, no masses or nodularity palpable   Vulva: normal   Vagina: normal mucosa   Cervix: no cervical motion tenderness, no lesions and string present   Uterus: normal size, mobile or non-tender   Adnexa: no mass, fullness, tenderness     Assessment:    1. Normal annual exam   Assessment     ICD-10-CM ICD-9-CM   1. Encounter for gynecological examination without abnormal finding Z01.419 V72.31   2. Screening for cervical cancer Z12.4 V76.2   3. Screening for HPV (human papillomavirus) Z11.51 V73.81     Plan:    Plan     []  Mammogram request made  [x]  PAP done  []  Labs:   []  GC/Chl/TV  []  DEXA scan   []  Referral for colonoscopy:       Roxy was seen today for annual exam.    Diagnoses and all orders for this visit:    Encounter for gynecological examination without abnormal finding  -     IGP, Aptima HPV, Rfx 16 / 18,45    Screening for cervical cancer  -     IGP, Aptima HPV, Rfx 16 / 18,45    Screening for HPV (human papillomavirus)  -     IGP, Aptima HPV, Rfx 16 / " 18,45            Counseling:  --Nutrition: Stressed importance of moderation and caloric balance, stressed fresh fruit and vegetables  --Exercise: Stressed the importance of regular exercise. 3-5 times weekly   - Discussed screening mammogram recommendations.   --Discussed benefits of screening colonoscopy- age 45 unless FH  --Discussed pap smear screening recommendations

## 2019-02-01 LAB
CYTOLOGIST CVX/VAG CYTO: NORMAL
CYTOLOGY CVX/VAG DOC THIN PREP: NORMAL
DX ICD CODE: NORMAL
HIV 1 & 2 AB SER-IMP: NORMAL
HPV I/H RISK 4 DNA CVX QL PROBE+SIG AMP: NEGATIVE
OTHER STN SPEC: NORMAL
PATH REPORT.FINAL DX SPEC: NORMAL
STAT OF ADQ CVX/VAG CYTO-IMP: NORMAL

## 2019-07-14 DIAGNOSIS — I10 ESSENTIAL HYPERTENSION: ICD-10-CM

## 2019-07-15 DIAGNOSIS — F41.9 ANXIETY: ICD-10-CM

## 2019-07-15 RX ORDER — BUSPIRONE HYDROCHLORIDE 15 MG/1
TABLET ORAL
Qty: 270 TABLET | Refills: 0 | Status: SHIPPED | OUTPATIENT
Start: 2019-07-15 | End: 2019-07-29 | Stop reason: SDUPTHER

## 2019-07-15 RX ORDER — LOSARTAN POTASSIUM 100 MG/1
TABLET ORAL
Qty: 90 TABLET | Refills: 3 | OUTPATIENT
Start: 2019-07-15

## 2019-07-18 DIAGNOSIS — I10 ESSENTIAL HYPERTENSION: ICD-10-CM

## 2019-07-18 RX ORDER — LOSARTAN POTASSIUM 100 MG/1
100 TABLET ORAL DAILY
Qty: 30 TABLET | Refills: 0 | Status: SHIPPED | OUTPATIENT
Start: 2019-07-18 | End: 2019-07-29 | Stop reason: SDUPTHER

## 2019-07-29 ENCOUNTER — OFFICE VISIT (OUTPATIENT)
Dept: FAMILY MEDICINE CLINIC | Facility: CLINIC | Age: 44
End: 2019-07-29

## 2019-07-29 VITALS
BODY MASS INDEX: 26.99 KG/M2 | HEIGHT: 65 IN | RESPIRATION RATE: 16 BRPM | DIASTOLIC BLOOD PRESSURE: 77 MMHG | WEIGHT: 162 LBS | SYSTOLIC BLOOD PRESSURE: 122 MMHG | HEART RATE: 78 BPM | TEMPERATURE: 98.3 F

## 2019-07-29 DIAGNOSIS — F41.9 ANXIETY: ICD-10-CM

## 2019-07-29 DIAGNOSIS — I10 ESSENTIAL HYPERTENSION: ICD-10-CM

## 2019-07-29 PROCEDURE — 99213 OFFICE O/P EST LOW 20 MIN: CPT | Performed by: FAMILY MEDICINE

## 2019-07-29 RX ORDER — LOSARTAN POTASSIUM 100 MG/1
100 TABLET ORAL DAILY
Qty: 90 TABLET | Refills: 3 | Status: SHIPPED | OUTPATIENT
Start: 2019-07-29 | End: 2020-05-27 | Stop reason: SDUPTHER

## 2019-07-29 RX ORDER — DULOXETIN HYDROCHLORIDE 30 MG/1
30 CAPSULE, DELAYED RELEASE ORAL DAILY
Qty: 90 CAPSULE | Refills: 3 | Status: CANCELLED | OUTPATIENT
Start: 2019-07-29

## 2019-07-29 RX ORDER — BUSPIRONE HYDROCHLORIDE 15 MG/1
15 TABLET ORAL 3 TIMES DAILY
Qty: 270 TABLET | Refills: 3 | Status: SHIPPED | OUTPATIENT
Start: 2019-07-29 | End: 2020-05-27 | Stop reason: SDUPTHER

## 2019-07-29 RX ORDER — DULOXETIN HYDROCHLORIDE 60 MG/1
60 CAPSULE, DELAYED RELEASE ORAL DAILY
Qty: 90 CAPSULE | Refills: 3 | Status: SHIPPED | OUTPATIENT
Start: 2019-07-29 | End: 2020-05-27 | Stop reason: SDUPTHER

## 2019-07-29 NOTE — PROGRESS NOTES
"Subjective   Roxy Calvert is a 43 y.o. female.     History of Present Illness     Chief Complaint:   Chief Complaint   Patient presents with   • Hypertension     med refill  - no labs   • Anxiety       Roxy Calvert 43 y.o. female who presents today for Medical Management of the below listed issues and medication refills.  she has a problem list of   Patient Active Problem List   Diagnosis   • Hypertension   • Anxiety   .  Since the last visit, she has overall felt well, although reports a slow wear-off effect with the Cymbalta and would like to try a higher dose.  she has been compliant with   Current Outpatient Medications:   •  DULoxetine (CYMBALTA) 60 MG capsule, Take 1 capsule by mouth Daily., Disp: 90 capsule, Rfl: 3  •  busPIRone (BUSPAR) 15 MG tablet, Take 1 tablet by mouth 3 (Three) Times a Day., Disp: 270 tablet, Rfl: 3  •  EPIPEN 2-AMANDA 0.3 MG/0.3ML solution auto-injector injection, , Disp: , Rfl:   •  losartan (COZAAR) 100 MG tablet, Take 1 tablet by mouth Daily., Disp: 90 tablet, Rfl: 3.  she denies medication side effects.    All of the chronic condition(s) listed above are stable w/o issues.    /77   Pulse 78   Temp 98.3 °F (36.8 °C) (Oral)   Resp 16   Ht 165.1 cm (65\")   Wt 73.5 kg (162 lb)   BMI 26.96 kg/m²     Results for orders placed or performed in visit on 01/30/19   IGP, Aptima HPV, Rfx 16 / 18,45   Result Value Ref Range    Diagnosis Comment     Specimen adequacy: Comment     Clinician Provided ICD-10: Comment     Performed by: Comment     . .     Note: Comment     Method: Comment     HPV Aptima Negative Negative           The following portions of the patient's history were reviewed and updated as appropriate: allergies, current medications, past family history, past medical history, past social history, past surgical history and problem list.    Review of Systems   Constitutional: Negative for activity change, chills, fatigue and fever.   Respiratory: Negative for cough " and chest tightness.    Cardiovascular: Negative for chest pain and palpitations.   Gastrointestinal: Negative for abdominal pain and nausea.   Endocrine: Negative for cold intolerance.   Psychiatric/Behavioral: Negative for behavioral problems and dysphoric mood.       Objective   Physical Exam   Constitutional: She appears well-developed and well-nourished.   Neck: Neck supple. No thyromegaly present.   Cardiovascular: Normal rate and regular rhythm.   No murmur heard.  Pulmonary/Chest: Effort normal and breath sounds normal.   Abdominal: Bowel sounds are normal. There is no tenderness.   Neurological: She is alert.   Psychiatric: She has a normal mood and affect. Her behavior is normal.   Nursing note and vitals reviewed.      Assessment/Plan   Roxy was seen today for hypertension and anxiety.    Diagnoses and all orders for this visit:    Essential hypertension  -     losartan (COZAAR) 100 MG tablet; Take 1 tablet by mouth Daily.    Anxiety  -     busPIRone (BUSPAR) 15 MG tablet; Take 1 tablet by mouth 3 (Three) Times a Day.  -     DULoxetine (CYMBALTA) 60 MG capsule; Take 1 capsule by mouth Daily.    Other orders  -     Cancel: DULoxetine (CYMBALTA) 30 MG capsule; Take 1 capsule by mouth Daily.

## 2020-01-03 ENCOUNTER — APPOINTMENT (OUTPATIENT)
Dept: WOMENS IMAGING | Facility: HOSPITAL | Age: 45
End: 2020-01-03

## 2020-01-03 ENCOUNTER — PROCEDURE VISIT (OUTPATIENT)
Dept: OBSTETRICS AND GYNECOLOGY | Age: 45
End: 2020-01-03

## 2020-01-03 DIAGNOSIS — Z12.31 VISIT FOR SCREENING MAMMOGRAM: Primary | ICD-10-CM

## 2020-01-03 PROCEDURE — 77067 SCR MAMMO BI INCL CAD: CPT | Performed by: RADIOLOGY

## 2020-01-03 PROCEDURE — 77067 SCR MAMMO BI INCL CAD: CPT | Performed by: OBSTETRICS & GYNECOLOGY

## 2020-02-03 ENCOUNTER — OFFICE VISIT (OUTPATIENT)
Dept: OBSTETRICS AND GYNECOLOGY | Age: 45
End: 2020-02-03

## 2020-02-03 VITALS
SYSTOLIC BLOOD PRESSURE: 120 MMHG | DIASTOLIC BLOOD PRESSURE: 64 MMHG | BODY MASS INDEX: 24.16 KG/M2 | HEIGHT: 65 IN | WEIGHT: 145 LBS

## 2020-02-03 DIAGNOSIS — Z80.3 FAMILY HISTORY OF BREAST CANCER: ICD-10-CM

## 2020-02-03 DIAGNOSIS — R92.2 DENSE BREAST TISSUE ON MAMMOGRAM: ICD-10-CM

## 2020-02-03 DIAGNOSIS — Z01.419 ENCOUNTER FOR GYNECOLOGICAL EXAMINATION WITHOUT ABNORMAL FINDING: Primary | ICD-10-CM

## 2020-02-03 PROCEDURE — 99396 PREV VISIT EST AGE 40-64: CPT | Performed by: OBSTETRICS & GYNECOLOGY

## 2020-02-03 NOTE — PROGRESS NOTES
"Routine Annual Visit    2/3/2020    Patient: Roxy Calvert          MR#:[id number]      Chief Complaint   Patient presents with   • Gynecologic Exam     Annual:Last pap ,last mammo 1/3/20,here       History of Present Illness    44 y.o. female  who presents for annual exam.   Doing well with IUD  Concerned about breast cancer risk  Got a letter regarding dense breasts  Will want keyon next year  Desires invitae, FH moderate risk, will check  Kids are well, otherwise well  mammo done recently was normal  Pap is UTD          No LMP recorded. Patient has had an implant.  Obstetric History:  OB History        4    Para   3    Term   2       1    AB        Living   2       SAB        TAB        Ectopic        Molar        Multiple        Live Births   2               Menstrual History:     No LMP recorded. Patient has had an implant.       Sexual History:       ________________________________________  Patient Active Problem List   Diagnosis   • Hypertension   • Anxiety       Past Medical History:   Diagnosis Date   • Allergic rhinitis    • Anxiety    • H/O bone density study never   • H/O complete eye exam 2008    dr qureshi   • Hypertension    • Hypertension in pregnancy    • Vaginal delivery     08 DR MUÑOZ BABY \"KYDWYN\" 6.12       Past Surgical History:   Procedure Laterality Date   •  SECTION  2006   • EAR TUBES     • MAMMO BILATERAL      age 36 scheduled 2017   • PAP SMEAR      gyn dr carli muñoz due 2017   • TYMPANOPLASTY     • WISDOM TOOTH EXTRACTION         Social History     Tobacco Use   Smoking Status Never Smoker   Smokeless Tobacco Never Used       has a current medication list which includes the following prescription(s): buspirone, duloxetine, epipen 2-kate, and losartan.  ________________________________________    Current contraception: IUD  History of abnormal Pap smear: no  Family history of Breast cancer: yes  Family history of " "uterine or ovarian cancer: no  Family History of colon cancer/colon polyps: no  History of abnormal mammogram: no      The following portions of the patient's history were reviewed and updated as appropriate: allergies, current medications, past family history, past medical history, past social history, past surgical history and problem list.    Review of Systems    Pertinent items are noted in HPI.     Objective   Physical Exam    /64   Ht 165.1 cm (65\")   Wt 65.8 kg (145 lb)   Breastfeeding No   BMI 24.13 kg/m²    BP Readings from Last 3 Encounters:   02/03/20 120/64   07/29/19 122/77   01/30/19 110/60      Wt Readings from Last 3 Encounters:   02/03/20 65.8 kg (145 lb)   07/29/19 73.5 kg (162 lb)   01/30/19 72.1 kg (159 lb)      BMI: Estimated body mass index is 24.13 kg/m² as calculated from the following:    Height as of this encounter: 165.1 cm (65\").    Weight as of this encounter: 65.8 kg (145 lb).      General:   alert, appears stated age and cooperative   Abdomen: soft, non-tender, without masses or organomegaly   Breast: inspection negative, no nipple discharge or bleeding, no masses or nodularity palpable   Vulva: normal   Vagina: normal mucosa   Cervix: no cervical motion tenderness and no lesions   Uterus: normal size, mobile or non-tender   Adnexa: no mass, fullness, tenderness     Assessment:    1. Normal annual exam   Assessment     ICD-10-CM ICD-9-CM   1. Encounter for gynecological examination without abnormal finding Z01.419 V72.31   2. Dense breast tissue on mammogram R92.2 793.89   3. Family history of breast cancer Z80.3 V16.3     Plan:    Plan     []  Mammogram request made  []  PAP done  []  Labs:   []  GC/Chl/TV  []  DEXA scan   []  Referral for colonoscopy:       Roxy was seen today for gynecologic exam.    Diagnoses and all orders for this visit:    Encounter for gynecological examination without abnormal finding    Dense breast tissue on mammogram    Family history of " breast cancer      invitae ordered on pt request, plan mammo with keyon next year    Counseling:  --Nutrition: Stressed importance of moderation and caloric balance, stressed fresh fruit and vegetables  --Exercise: Stressed the importance of regular exercise. 3-5 times weekly   - Discussed screening mammogram recommendations.   --Discussed benefits of screening colonoscopy- age 45 unless FH  --Discussed pap smear screening recommendations

## 2020-02-13 DIAGNOSIS — Z12.31 SCREENING MAMMOGRAM, ENCOUNTER FOR: Primary | ICD-10-CM

## 2020-02-18 ENCOUNTER — TELEPHONE (OUTPATIENT)
Dept: OBSTETRICS AND GYNECOLOGY | Age: 45
End: 2020-02-18

## 2020-02-18 DIAGNOSIS — Z15.09 GENETIC SUSCEPTIBILITY TO CANCER: Primary | ICD-10-CM

## 2020-02-18 NOTE — TELEPHONE ENCOUNTER
Pt returned Dr Parekh call; pt will have her phone on her when you call back. 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 Please Advise

## 2020-02-19 ENCOUNTER — TELEPHONE (OUTPATIENT)
Dept: OBSTETRICS AND GYNECOLOGY | Age: 45
End: 2020-02-19

## 2020-05-15 ENCOUNTER — TELEPHONE (OUTPATIENT)
Dept: FAMILY MEDICINE CLINIC | Facility: CLINIC | Age: 45
End: 2020-05-15

## 2020-05-15 DIAGNOSIS — Z00.00 GENERAL MEDICAL EXAM: Primary | ICD-10-CM

## 2020-05-19 ENCOUNTER — TELEPHONE (OUTPATIENT)
Dept: OTHER | Facility: HOSPITAL | Age: 45
End: 2020-05-19

## 2020-05-19 NOTE — TELEPHONE ENCOUNTER
Related to the COVID-19 precautions, Dr. Li will do a phone consult with the patient. She already had testing done and was counseling only.

## 2020-05-22 LAB
ALBUMIN SERPL-MCNC: 4.6 G/DL (ref 3.5–5.2)
ALBUMIN/GLOB SERPL: 2.6 G/DL
ALP SERPL-CCNC: 49 U/L (ref 39–117)
ALT SERPL-CCNC: 11 U/L (ref 1–33)
AST SERPL-CCNC: 11 U/L (ref 1–32)
BASOPHILS # BLD AUTO: 0.06 10*3/MM3 (ref 0–0.2)
BASOPHILS NFR BLD AUTO: 0.9 % (ref 0–1.5)
BILIRUB SERPL-MCNC: 0.5 MG/DL (ref 0.2–1.2)
BUN SERPL-MCNC: 13 MG/DL (ref 6–20)
BUN/CREAT SERPL: 18.3 (ref 7–25)
CALCIUM SERPL-MCNC: 9 MG/DL (ref 8.6–10.5)
CHLORIDE SERPL-SCNC: 104 MMOL/L (ref 98–107)
CHOLEST SERPL-MCNC: 189 MG/DL (ref 0–200)
CO2 SERPL-SCNC: 25.4 MMOL/L (ref 22–29)
CREAT SERPL-MCNC: 0.71 MG/DL (ref 0.57–1)
EOSINOPHIL # BLD AUTO: 0.13 10*3/MM3 (ref 0–0.4)
EOSINOPHIL NFR BLD AUTO: 2 % (ref 0.3–6.2)
ERYTHROCYTE [DISTWIDTH] IN BLOOD BY AUTOMATED COUNT: 12 % (ref 12.3–15.4)
GLOBULIN SER CALC-MCNC: 1.8 GM/DL
GLUCOSE SERPL-MCNC: 101 MG/DL (ref 65–99)
HCT VFR BLD AUTO: 38.8 % (ref 34–46.6)
HDLC SERPL-MCNC: 74 MG/DL (ref 40–60)
HGB BLD-MCNC: 13.4 G/DL (ref 12–15.9)
IMM GRANULOCYTES # BLD AUTO: 0.02 10*3/MM3 (ref 0–0.05)
IMM GRANULOCYTES NFR BLD AUTO: 0.3 % (ref 0–0.5)
LDLC SERPL CALC-MCNC: 104 MG/DL (ref 0–100)
LDLC/HDLC SERPL: 1.41 {RATIO}
LYMPHOCYTES # BLD AUTO: 2.53 10*3/MM3 (ref 0.7–3.1)
LYMPHOCYTES NFR BLD AUTO: 39.4 % (ref 19.6–45.3)
MCH RBC QN AUTO: 30.7 PG (ref 26.6–33)
MCHC RBC AUTO-ENTMCNC: 34.5 G/DL (ref 31.5–35.7)
MCV RBC AUTO: 89 FL (ref 79–97)
MONOCYTES # BLD AUTO: 0.51 10*3/MM3 (ref 0.1–0.9)
MONOCYTES NFR BLD AUTO: 7.9 % (ref 5–12)
NEUTROPHILS # BLD AUTO: 3.17 10*3/MM3 (ref 1.7–7)
NEUTROPHILS NFR BLD AUTO: 49.5 % (ref 42.7–76)
NRBC BLD AUTO-RTO: 0 /100 WBC (ref 0–0.2)
PLATELET # BLD AUTO: 362 10*3/MM3 (ref 140–450)
POTASSIUM SERPL-SCNC: 4 MMOL/L (ref 3.5–5.2)
PROT SERPL-MCNC: 6.4 G/DL (ref 6–8.5)
RBC # BLD AUTO: 4.36 10*6/MM3 (ref 3.77–5.28)
SODIUM SERPL-SCNC: 139 MMOL/L (ref 136–145)
TRIGL SERPL-MCNC: 53 MG/DL (ref 0–150)
TSH SERPL DL<=0.005 MIU/L-ACNC: 2.08 UIU/ML (ref 0.27–4.2)
VLDLC SERPL CALC-MCNC: 10.6 MG/DL (ref 5–40)
WBC # BLD AUTO: 6.42 10*3/MM3 (ref 3.4–10.8)

## 2020-05-27 ENCOUNTER — OFFICE VISIT (OUTPATIENT)
Dept: FAMILY MEDICINE CLINIC | Facility: CLINIC | Age: 45
End: 2020-05-27

## 2020-05-27 VITALS
BODY MASS INDEX: 22.99 KG/M2 | HEART RATE: 74 BPM | DIASTOLIC BLOOD PRESSURE: 86 MMHG | WEIGHT: 138 LBS | RESPIRATION RATE: 16 BRPM | HEIGHT: 65 IN | TEMPERATURE: 98.9 F | SYSTOLIC BLOOD PRESSURE: 131 MMHG

## 2020-05-27 DIAGNOSIS — Z80.1 FAMILY HISTORY OF LUNG CANCER: ICD-10-CM

## 2020-05-27 DIAGNOSIS — F41.9 ANXIETY: ICD-10-CM

## 2020-05-27 DIAGNOSIS — Z00.00 ANNUAL PHYSICAL EXAM: Primary | ICD-10-CM

## 2020-05-27 DIAGNOSIS — I10 ESSENTIAL HYPERTENSION: ICD-10-CM

## 2020-05-27 PROCEDURE — 99396 PREV VISIT EST AGE 40-64: CPT | Performed by: FAMILY MEDICINE

## 2020-05-27 RX ORDER — BUSPIRONE HYDROCHLORIDE 15 MG/1
15 TABLET ORAL 3 TIMES DAILY
Qty: 270 TABLET | Refills: 3 | Status: SHIPPED | OUTPATIENT
Start: 2020-05-27 | End: 2021-06-24 | Stop reason: SDUPTHER

## 2020-05-27 RX ORDER — DULOXETIN HYDROCHLORIDE 60 MG/1
60 CAPSULE, DELAYED RELEASE ORAL DAILY
Qty: 90 CAPSULE | Refills: 3 | Status: SHIPPED | OUTPATIENT
Start: 2020-05-27 | End: 2021-06-24 | Stop reason: SDUPTHER

## 2020-05-27 RX ORDER — LOSARTAN POTASSIUM 100 MG/1
100 TABLET ORAL DAILY
Qty: 90 TABLET | Refills: 3 | Status: SHIPPED | OUTPATIENT
Start: 2020-05-27 | End: 2021-06-24 | Stop reason: SDUPTHER

## 2020-05-27 NOTE — PROGRESS NOTES
"Subjective   Roxy Calvert is a 44 y.o. female.     CC: Annual Exam    History of Present Illness     Roxy Calvert 44 y.o. female who presents for an Annual Wellness Visit.  she has a history of   Patient Active Problem List   Diagnosis   • Hypertension   • Anxiety   .  she has been feeling well.  Labs results discussed in detail with the patient.  Plan to update vaccines if needed today.  I  reviewed health maintenance with her as part of my preventative care plan.    Pt had some genetic testing done and shows that she is at increased risk for Adenocarcinoma of the lung, which actually did kill her mother 6 years ago.    The following portions of the patient's history were reviewed and updated as appropriate: allergies, current medications, past family history, past medical history, past social history, past surgical history and problem list.    Review of Systems   Constitutional: Negative for appetite change, fever and unexpected weight change.   HENT: Negative for ear pain, facial swelling and sore throat.    Eyes: Negative for pain and visual disturbance.   Respiratory: Negative for chest tightness, shortness of breath and wheezing.    Cardiovascular: Negative for chest pain and palpitations.   Gastrointestinal: Negative for abdominal pain and blood in stool.   Endocrine: Negative.    Genitourinary: Negative for difficulty urinating and hematuria.   Musculoskeletal: Negative for joint swelling.   Neurological: Negative for tremors, seizures and syncope.   Hematological: Negative for adenopathy.   Psychiatric/Behavioral: Negative.        /86   Pulse 74   Temp 98.9 °F (37.2 °C) (Oral)   Resp 16   Ht 165.1 cm (65\")   Wt 62.6 kg (138 lb)   BMI 22.96 kg/m²     Objective   Physical Exam   Constitutional: She is oriented to person, place, and time. She appears well-developed and well-nourished.   HENT:   Head: Normocephalic and atraumatic.   Right Ear: External ear normal.   Left Ear: External ear " normal.   Mouth/Throat: No oropharyngeal exudate.   Eyes: Pupils are equal, round, and reactive to light. Conjunctivae and EOM are normal. No scleral icterus.   Neck: Normal range of motion. Neck supple. No thyromegaly present.   Cardiovascular: Normal rate and regular rhythm. Exam reveals no gallop.   No murmur heard.  Pulmonary/Chest: Effort normal and breath sounds normal. She has no wheezes. She has no rales.   Abdominal: Soft. Bowel sounds are normal. She exhibits no distension and no mass. There is no tenderness. No hernia.   Musculoskeletal: Normal range of motion. She exhibits no edema, tenderness or deformity.   Lymphadenopathy:     She has no cervical adenopathy.   Neurological: She is alert and oriented to person, place, and time. She has normal reflexes.   Skin: Skin is warm and dry. No rash noted.   Psychiatric: She has a normal mood and affect. Her behavior is normal.   Nursing note and vitals reviewed.  Labs reviewed with pt today during visit. All questions answered.  Nelly present for exam.    Assessment/Plan   Roxy was seen today for annual exam, hypertension and anxiety.    Diagnoses and all orders for this visit:    Annual physical exam    Essential hypertension  -     losartan (COZAAR) 100 MG tablet; Take 1 tablet by mouth Daily.    Anxiety  -     busPIRone (BUSPAR) 15 MG tablet; Take 1 tablet by mouth 3 (Three) Times a Day.  -     DULoxetine (CYMBALTA) 60 MG capsule; Take 1 capsule by mouth Daily.    Family history of lung cancer  -     Ambulatory Referral to Pulmonology    diet/exercise discussed.

## 2020-07-01 ENCOUNTER — TRANSCRIBE ORDERS (OUTPATIENT)
Dept: ADMINISTRATIVE | Facility: HOSPITAL | Age: 45
End: 2020-07-01

## 2020-07-01 DIAGNOSIS — R91.8 PULMONARY NODULES: Primary | ICD-10-CM

## 2020-07-08 ENCOUNTER — APPOINTMENT (OUTPATIENT)
Dept: CT IMAGING | Facility: HOSPITAL | Age: 45
End: 2020-07-08

## 2020-07-14 ENCOUNTER — HOSPITAL ENCOUNTER (OUTPATIENT)
Dept: CT IMAGING | Facility: HOSPITAL | Age: 45
Discharge: HOME | End: 2020-07-14
Admitting: INTERNAL MEDICINE

## 2020-07-14 DIAGNOSIS — R91.8 PULMONARY NODULES: ICD-10-CM

## 2020-07-14 PROCEDURE — 71250 CT THORAX DX C-: CPT

## 2020-07-20 ENCOUNTER — TRANSCRIBE ORDERS (OUTPATIENT)
Dept: ADMINISTRATIVE | Facility: HOSPITAL | Age: 45
End: 2020-07-20

## 2020-07-20 DIAGNOSIS — R91.8 PULMONARY NODULES: Primary | ICD-10-CM

## 2020-07-30 ENCOUNTER — OFFICE VISIT (OUTPATIENT)
Dept: OTHER | Facility: HOSPITAL | Age: 45
End: 2020-07-30

## 2020-07-30 ENCOUNTER — PATIENT MESSAGE (OUTPATIENT)
Dept: FAMILY MEDICINE CLINIC | Facility: CLINIC | Age: 45
End: 2020-07-30

## 2020-07-30 ENCOUNTER — PREP FOR SURGERY (OUTPATIENT)
Dept: OTHER | Facility: HOSPITAL | Age: 45
End: 2020-07-30

## 2020-07-30 VITALS
OXYGEN SATURATION: 96 % | TEMPERATURE: 99 F | HEART RATE: 76 BPM | RESPIRATION RATE: 16 BRPM | SYSTOLIC BLOOD PRESSURE: 127 MMHG | HEIGHT: 62 IN | DIASTOLIC BLOOD PRESSURE: 81 MMHG | WEIGHT: 138.4 LBS | BODY MASS INDEX: 25.47 KG/M2

## 2020-07-30 DIAGNOSIS — R91.8 PULMONARY NODULES/LESIONS, MULTIPLE: Primary | ICD-10-CM

## 2020-07-30 PROCEDURE — G0463 HOSPITAL OUTPT CLINIC VISIT: HCPCS

## 2020-07-30 PROCEDURE — 99245 OFF/OP CONSLTJ NEW/EST HI 55: CPT | Performed by: THORACIC SURGERY (CARDIOTHORACIC VASCULAR SURGERY)

## 2020-07-30 RX ORDER — SODIUM CHLORIDE 0.9 % (FLUSH) 0.9 %
3-10 SYRINGE (ML) INJECTION AS NEEDED
Status: CANCELLED | OUTPATIENT
Start: 2020-08-21

## 2020-07-30 RX ORDER — CELECOXIB 200 MG/1
200 CAPSULE ORAL ONCE
Status: CANCELLED | OUTPATIENT
Start: 2020-08-21 | End: 2020-07-30

## 2020-07-30 RX ORDER — GABAPENTIN 300 MG/1
600 CAPSULE ORAL ONCE
Status: CANCELLED | OUTPATIENT
Start: 2020-08-21 | End: 2020-07-30

## 2020-07-30 RX ORDER — SODIUM CHLORIDE 0.9 % (FLUSH) 0.9 %
3 SYRINGE (ML) INJECTION EVERY 12 HOURS SCHEDULED
Status: CANCELLED | OUTPATIENT
Start: 2020-08-21

## 2020-07-30 RX ORDER — CEFAZOLIN SODIUM 2 G/100ML
2 INJECTION, SOLUTION INTRAVENOUS ONCE
Status: CANCELLED | OUTPATIENT
Start: 2020-08-21 | End: 2020-07-30

## 2020-07-30 RX ORDER — ACETAMINOPHEN 500 MG
1000 TABLET ORAL ONCE
Status: CANCELLED | OUTPATIENT
Start: 2020-08-21 | End: 2020-07-30

## 2020-07-30 NOTE — PROGRESS NOTES
.  Subjective   Patient ID: Roxy Calvert is a 44 y.o. female being seen for consultation today at the request of Tonio Talley MD.    History of Present Illness  Dear Colleague,  Roxy Calvert was seen in the [facility] today, July 30, 2020 as part of our multidisciplinary thoracic oncology clinic.  I have reviewed her history, CT scan and have examined her.  Thank you for asking us to participate in the care of Mrs. Calvert.    Patient is a 44-year-old  female who is a non-smoker.  She has had significant secondhand smoke exposure from her mother who smoked even as the patient was a child.  Her mother has passed away from adenocarcinoma of the lung.  Patient has a strong family history for breast cancer.  She underwent genetic counseling and was found to have no markers for breast cancer but was positive for EGFR.  It was recommended that she have a CT scan of her chest.  The scan has shown multiple groundglass opacities throughout both lungs as well as a mediastinal cyst.  She has been referred here for further evaluation.    She is active walking 4 to 5 miles daily.  She has no significant shortness of breath or wheezing.  She has no cough or hemoptysis.  She has no hoarseness or change in her voice.  There is no pleuritic pain.  She has had no unexplained weight loss.    The following portions of the patient's history were reviewed and updated as appropriate: allergies, current medications, past family history, past medical history, past social history, past surgical history and problem list.  Review of Systems   Constitution: Positive for night sweats.   HENT: Negative.    Eyes: Negative.    Cardiovascular: Negative.    Respiratory: Negative.    Endocrine: Negative.    Hematologic/Lymphatic: Negative.    Skin: Negative.    Musculoskeletal: Negative.    Gastrointestinal: Positive for constipation and flatus.   Genitourinary: Negative.    Neurological: Negative.  "   Psychiatric/Behavioral: The patient is nervous/anxious.      Patient Active Problem List   Diagnosis   • Hypertension   • Anxiety   • Pulmonary nodules/lesions, multiple   • Lung nodules     Past Medical History:   Diagnosis Date   • Allergic rhinitis    • Anxiety    • H/O bone density study never   • H/O complete eye exam 2008    dr qureshi   • Hypertension    • Hypertension in pregnancy    • Vaginal delivery     08 DR MUÑOZ BABY \"KYDWYN\" 6.12     Past Surgical History:   Procedure Laterality Date   •  SECTION  2006   • EAR TUBES     • MAMMO BILATERAL      age 36 scheduled 2017   • PAP SMEAR      gyn dr carli muñoz due 2017   • TYMPANOPLASTY     • WISDOM TOOTH EXTRACTION       Family History   Problem Relation Age of Onset   • ALS Maternal Grandfather    • Hypertension Maternal Grandfather    • Diabetes Maternal Uncle    • Hypertension Maternal Grandmother    • No Known Problems Mother    • No Known Problems Father    • No Known Problems Sister    • No Known Problems Brother    • No Known Problems Daughter    • No Known Problems Son    • No Known Problems Paternal Grandmother    • No Known Problems Maternal Aunt    • No Known Problems Paternal Aunt    • BRCA 1/2 Neg Hx    • Breast cancer Neg Hx    • Colon cancer Neg Hx    • Endometrial cancer Neg Hx    • Ovarian cancer Neg Hx      Social History     Socioeconomic History   • Marital status:      Spouse name: Not on file   • Number of children: Not on file   • Years of education: Not on file   • Highest education level: Not on file   Tobacco Use   • Smoking status: Never Smoker   • Smokeless tobacco: Never Used   Substance and Sexual Activity   • Alcohol use: Yes     Comment: occ   • Drug use: Defer   • Sexual activity: Defer       Current Outpatient Medications:   •  busPIRone (BUSPAR) 15 MG tablet, Take 1 tablet by mouth 3 (Three) Times a Day., Disp: 270 tablet, Rfl: 3  •  DULoxetine (CYMBALTA) 60 MG capsule, " Take 1 capsule by mouth Daily., Disp: 90 capsule, Rfl: 3  •  losartan (COZAAR) 100 MG tablet, Take 1 tablet by mouth Daily., Disp: 90 tablet, Rfl: 3  •  Multiple Vitamins-Minerals (MULTIVITAMIN ADULT PO), Take 1 tablet by mouth Daily., Disp: , Rfl:   •  EPIPEN 2-AMANDA 0.3 MG/0.3ML solution auto-injector injection, , Disp: , Rfl:   Allergies   Allergen Reactions   • Latex         Objective   Vitals:    07/30/20 0901   BP: 127/81   Pulse: 76   Resp: 16   Temp: 99 °F (37.2 °C)   SpO2: 96%     Physical Exam   Constitutional: She is oriented to person, place, and time. She appears well-developed and well-nourished.   HENT:   Head: Normocephalic.   Eyes: Pupils are equal, round, and reactive to light. Conjunctivae, EOM and lids are normal.   Neck: Trachea normal and normal range of motion. Neck supple. No hepatojugular reflux and no JVD present. Carotid bruit is not present. No thyroid mass and no thyromegaly present.   Cardiovascular: Normal rate, regular rhythm, S1 normal, S2 normal, normal heart sounds and normal pulses.  No extrasystoles are present. PMI is not displaced.   Pulmonary/Chest: Effort normal and breath sounds normal.   Abdominal: Soft. Normal appearance and bowel sounds are normal. She exhibits no mass. There is no hepatosplenomegaly. There is no tenderness. No hernia.   Musculoskeletal: Normal range of motion.   Neurological: She is alert and oriented to person, place, and time. She has normal strength and normal reflexes. No cranial nerve deficit or sensory deficit. She displays a negative Romberg sign.   Skin: Skin is warm, dry and intact.   Psychiatric: She has a normal mood and affect. Her speech is normal and behavior is normal. Judgment and thought content normal. Cognition and memory are normal.     Independent Review of Radiographic Studies:    CT scan of the chest performed July 14, 2020 was independently reviewed.  There are multiple pulmonary nodules throughout both lung fields.  Most of these  are groundglass opacities.  A few appear more solid.  There is no hilar or mediastinal adenopathy.  There is no pleural effusion.  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  Upper abdomen is unremarkable.      Assessment/Plan   Assessment:  Differential diagnosis for these nodules is quite extensive.  I am most concerned that this could represent adenocarcinoma given her family history her exposure to smoke at a early age and the fact that she is EGFR positive.  I have recommended a left robot-assisted wedge resection of the lung nodules for tissue diagnosis.  At the same time we can assess the paraspinal cyst and possibly do a biopsy if indicated.  I have explained this to the patient and her  in detail.  I have discussed with him the surgery as well as the risks and benefits.  I have answered all their questions to their satisfaction.  The patient has requested that we proceed.    Plan:    Patient will be scheduled for robot-assisted wedge resection left lung nodules and possible biopsy of mediastinal cyst at [facility].  Case request and preoperative orders have been placed in epic.  I will keep you informed of her progress.  Thank you for allowing us to participate in the care of Ms. Calvert    Diagnoses and all orders for this visit:    Pulmonary nodules/lesions, multiple  -     Case Request    Other orders  -     Multiple Vitamins-Minerals (MULTIVITAMIN ADULT PO); Take 1 tablet by mouth Daily.

## 2020-07-31 NOTE — PATIENT INSTRUCTIONS
"Pt seen by Dr. Burnett and will be scheduled for a robot-assisted wedge resection left lung nodules and possible biopsy of mediastinal cyst. Pt given VATS brochure, \"What to Expect\" VATS/Thoracotomy handout with discharge instructions along with ERAS pamphlet.  Pt instructed to call nurse navigator with any questions or concerns. Pt given contact cards for Dr. Burnett and nurse navigator.  "

## 2020-08-12 ENCOUNTER — TRANSCRIBE ORDERS (OUTPATIENT)
Dept: PREADMISSION TESTING | Facility: HOSPITAL | Age: 45
End: 2020-08-12

## 2020-08-12 DIAGNOSIS — Z01.818 OTHER SPECIFIED PRE-OPERATIVE EXAMINATION: Primary | ICD-10-CM

## 2020-08-14 ENCOUNTER — APPOINTMENT (OUTPATIENT)
Dept: PREADMISSION TESTING | Facility: HOSPITAL | Age: 45
End: 2020-08-14

## 2020-08-14 VITALS
DIASTOLIC BLOOD PRESSURE: 79 MMHG | RESPIRATION RATE: 16 BRPM | SYSTOLIC BLOOD PRESSURE: 130 MMHG | BODY MASS INDEX: 25.12 KG/M2 | OXYGEN SATURATION: 100 % | WEIGHT: 136.5 LBS | HEIGHT: 62 IN | TEMPERATURE: 97.9 F | HEART RATE: 68 BPM

## 2020-08-14 DIAGNOSIS — R91.8 PULMONARY NODULES/LESIONS, MULTIPLE: ICD-10-CM

## 2020-08-14 LAB
ANION GAP SERPL CALCULATED.3IONS-SCNC: 10 MMOL/L (ref 5–15)
BASOPHILS # BLD AUTO: 0.09 10*3/MM3 (ref 0–0.2)
BASOPHILS NFR BLD AUTO: 1.6 % (ref 0–1.5)
BUN SERPL-MCNC: 7 MG/DL (ref 6–20)
BUN/CREAT SERPL: 12.7 (ref 7–25)
CALCIUM SPEC-SCNC: 9.5 MG/DL (ref 8.6–10.5)
CHLORIDE SERPL-SCNC: 103 MMOL/L (ref 98–107)
CO2 SERPL-SCNC: 25 MMOL/L (ref 22–29)
CREAT SERPL-MCNC: 0.55 MG/DL (ref 0.57–1)
DEPRECATED RDW RBC AUTO: 41.9 FL (ref 37–54)
EOSINOPHIL # BLD AUTO: 0.48 10*3/MM3 (ref 0–0.4)
EOSINOPHIL NFR BLD AUTO: 8.7 % (ref 0.3–6.2)
ERYTHROCYTE [DISTWIDTH] IN BLOOD BY AUTOMATED COUNT: 12.1 % (ref 12.3–15.4)
GFR SERPL CREATININE-BSD FRML MDRD: 121 ML/MIN/1.73
GLUCOSE SERPL-MCNC: 97 MG/DL (ref 65–99)
HCT VFR BLD AUTO: 40.2 % (ref 34–46.6)
HGB BLD-MCNC: 13.4 G/DL (ref 12–15.9)
IMM GRANULOCYTES # BLD AUTO: 0.01 10*3/MM3 (ref 0–0.05)
IMM GRANULOCYTES NFR BLD AUTO: 0.2 % (ref 0–0.5)
INR PPP: 1.04 (ref 0.9–1.1)
LYMPHOCYTES # BLD AUTO: 2.13 10*3/MM3 (ref 0.7–3.1)
LYMPHOCYTES NFR BLD AUTO: 38.7 % (ref 19.6–45.3)
MCH RBC QN AUTO: 31.1 PG (ref 26.6–33)
MCHC RBC AUTO-ENTMCNC: 33.3 G/DL (ref 31.5–35.7)
MCV RBC AUTO: 93.3 FL (ref 79–97)
MONOCYTES # BLD AUTO: 0.43 10*3/MM3 (ref 0.1–0.9)
MONOCYTES NFR BLD AUTO: 7.8 % (ref 5–12)
NEUTROPHILS NFR BLD AUTO: 2.36 10*3/MM3 (ref 1.7–7)
NEUTROPHILS NFR BLD AUTO: 43 % (ref 42.7–76)
NRBC BLD AUTO-RTO: 0 /100 WBC (ref 0–0.2)
PLATELET # BLD AUTO: 332 10*3/MM3 (ref 140–450)
PMV BLD AUTO: 8.6 FL (ref 6–12)
POTASSIUM SERPL-SCNC: 4.7 MMOL/L (ref 3.5–5.2)
PROTHROMBIN TIME: 13.5 SECONDS (ref 11.7–14.2)
RBC # BLD AUTO: 4.31 10*6/MM3 (ref 3.77–5.28)
SODIUM SERPL-SCNC: 138 MMOL/L (ref 136–145)
WBC # BLD AUTO: 5.5 10*3/MM3 (ref 3.4–10.8)

## 2020-08-14 PROCEDURE — 93010 ELECTROCARDIOGRAM REPORT: CPT | Performed by: INTERNAL MEDICINE

## 2020-08-14 PROCEDURE — 93005 ELECTROCARDIOGRAM TRACING: CPT

## 2020-08-14 PROCEDURE — 85025 COMPLETE CBC W/AUTO DIFF WBC: CPT | Performed by: THORACIC SURGERY (CARDIOTHORACIC VASCULAR SURGERY)

## 2020-08-14 PROCEDURE — 36415 COLL VENOUS BLD VENIPUNCTURE: CPT

## 2020-08-14 PROCEDURE — 85610 PROTHROMBIN TIME: CPT | Performed by: THORACIC SURGERY (CARDIOTHORACIC VASCULAR SURGERY)

## 2020-08-14 PROCEDURE — 80048 BASIC METABOLIC PNL TOTAL CA: CPT | Performed by: THORACIC SURGERY (CARDIOTHORACIC VASCULAR SURGERY)

## 2020-08-14 RX ORDER — SENNA PLUS 8.6 MG/1
1 TABLET ORAL AS NEEDED
COMMUNITY
End: 2022-03-25 | Stop reason: SDDI

## 2020-08-14 RX ORDER — CHLORHEXIDINE GLUCONATE 500 MG/1
CLOTH TOPICAL
Status: ON HOLD | COMMUNITY
End: 2020-08-21

## 2020-08-14 RX ORDER — AMOXICILLIN 500 MG
1200 CAPSULE ORAL DAILY
COMMUNITY

## 2020-08-14 RX ORDER — VIT C/B6/B5/MAGNESIUM/HERB 173 50-5-6-5MG
3 CAPSULE ORAL DAILY
COMMUNITY
End: 2022-08-17

## 2020-08-14 RX ORDER — THIAMINE HCL 100 MG
1 TABLET ORAL DAILY
COMMUNITY
End: 2021-09-28 | Stop reason: SDDI

## 2020-08-14 NOTE — DISCHARGE INSTRUCTIONS
Take the following medications the morning of surgery:  BARRIE SPARKS    ARRIVE TO MAIN OR DESK 8- AT 5:30AM      If you are on prescription narcotic pain medication to control your pain you may also take that medication the morning of surgery.    General Instructions:  • Do not eat solid food after midnight the night before surgery.  • You may drink clear liquids day of surgery but must stop at least one hour before your hospital arrival time.(4:30AM)  • It is beneficial for you to have a clear drink that contains carbohydrates the day of surgery.  We suggest a 12 to 20 ounce bottle of Gatorade or Powerade for non-diabetic patients or a 12 to 20 ounce bottle of G2 or Powerade Zero for diabetic patients. (Pediatric patients, are not advised to drink a 12 to 20 ounce carbohydrate drink)    Clear liquids are liquids you can see through.  Nothing red in color.     Plain water                               Sports drinks  Sodas                                   Gelatin (Jell-O)  Fruit juices without pulp such as white grape juice and apple juice  Popsicles that contain no fruit or yogurt  Tea or coffee (no cream or milk added)  Gatorade / Powerade  G2 / Powerade Zero    • Infants may have breast milk up to four hours before surgery.  • Infants drinking formula may drink formula up to six hours before surgery.   • Patients who avoid smoking, chewing tobacco and alcohol for 4 weeks prior to surgery have a reduced risk of post-operative complications.  Quit smoking as many days before surgery as you can.  • Do not smoke, use chewing tobacco or drink alcohol the day of surgery.   • If applicable bring your C-PAP/ BI-PAP machine.  • Bring any papers given to you in the doctor’s office.  • Wear clean comfortable clothes.  • Do not wear contact lenses, false eyelashes or make-up.  Bring a case for your glasses.   • Bring crutches or walker if applicable.  • Remove all piercings.  Leave jewelry and any other valuables  at home.  • Hair extensions with metal clips must be removed prior to surgery.  • The Pre-Admission Testing nurse will instruct you to bring medications if unable to obtain an accurate list in Pre-Admission Testing.        If you were given a blood bank ID arm band remember to bring it with you the day of surgery.    Preventing a Surgical Site Infection:  • For 2 to 3 days before surgery, avoid shaving with a razor because the razor can irritate skin and make it easier to develop an infection.    • Any areas of open skin can increase the risk of a post-operative wound infection by allowing bacteria to enter and travel throughout the body.  Notify your surgeon if you have any skin wounds / rashes even if it is not near the expected surgical site.  The area will need assessed to determine if surgery should be delayed until it is healed.  • The night prior to surgery shower using a fresh bar of anti-bacterial soap (such as Dial) and clean washcloth.  Sleep in a clean bed with clean clothing.  Do not allow pets to sleep with you.  • Shower on the morning of surgery using a fresh bar of anti-bacterial soap (such as Dial) and clean washcloth.  Dry with a clean towel and dress in clean clothing.  • Ask your surgeon if you will be receiving antibiotics prior to surgery.  • Make sure you, your family, and all healthcare providers clean their hands with soap and water or an alcohol based hand  before caring for you or your wound.    Day of surgery:  Your arrival time is approximately two hours before your scheduled surgery time.  Upon arrival, a Pre-op nurse and Anesthesiologist will review your health history, obtain vital signs, and answer questions you may have.  The only belongings needed at this time will be a list of your home medications and if applicable your C-PAP/BI-PAP machine.  If you are staying overnight your family can leave the rest of your belongings in the car and bring them to your room later.  A  Pre-op nurse will start an IV and you may receive medication in preparation for surgery, including something to help you relax.  Your family will be able to see you in the Pre-op area.  Two visitors at a time will be allowed in the Pre-op room.  While you are in surgery your family should notify the waiting room  if they leave the waiting room area and provide a contact phone number.    Please be aware that surgery does come with discomfort.  We want to make every effort to control your discomfort so please discuss any uncontrolled symptoms with your nurse.   Your doctor will most likely have prescribed pain medications.      If you are going home after surgery you will receive individualized written care instructions before being discharged.  A responsible adult must drive you to and from the hospital on the day of your surgery and stay with you for 24 hours.    If you are staying overnight following surgery, you will be transported to your hospital room following the recovery period.  [facility] has all private rooms.    If you have any questions please call Pre-Admission Testing at [phone].  Deductibles and co-payments are collected on the day of service. Please be prepared to pay the required co-pay, deductible or deposit on the day of service as defined by your plan.    Patient Education for Self-Quarantine Process    Following your COVID testing, we strongly recommend that you do not leave your home after you have been tested for COVID except to get medical care. This includes not going to work, school or to public areas.  If this is not possible for you to do please limit your activities to only required outings.  Be sure to wear a mask when you are with other people, practice social distancing and wash your hands frequently.      The following items provide additional details to keep you safe.  • Wash your hands with soap and water frequently for at least 20 seconds.    • Avoid touching your eyes, nose and mouth with unwashed hands.  • Do not share anything - utensils, towels, food from the same bowl.   • Have your own utensils, drinking glass, dishes, towels and bedding.   • Do not have visitors.   • Do use FaceTime to stay in touch with family and friends.  • You should stay in a specific room away from others if possible.   • Stay at least 6 feet away from others in the home if you cannot have a dedicated room to yourself.   • Do not snuggle with your pet. While the CDC says there is no evidence that pets can spread COVID-19 or be infected from humans, it is probably best to avoid “petting, snuggling, being kissed or licked and sharing food (during self-quarantine)”, according to the CDC.   • Sanitize household surfaces daily. Include all high touch areas (door handles, light switches, phones, countertops, etc.)  • Do not share a bathroom with others, if possible.   • Wear a mask around others in your home if you are unable to stay in a separate room or 6 feet apart. If  you are unable to wear a mask, have your family member wear a mask if they must be within 6 feet of you.   Call your surgeon immediately if you experience any of the following symptoms:  • Sore Throat  • Shortness of Breath or difficulty breathing  • Cough  • Chills  • Body soreness or muscle pain  • Headache  • Fever  • New loss of taste or smell  • Do not arrive for your surgery ill.  Your procedure will need to be rescheduled to another time.  You will need to call your physician before the day of surgery to avoid any unnecessary exposure to hospital staff as well as other patients.      CHLORHEXIDINE CLOTH INSTRUCTIONS  The morning of surgery follow these instructions using the Chlorhexidine cloths you've been given.  These steps reduce bacteria on the body.  Do not use the cloths near your eyes, ears mouth, genitalia or on open wounds.  Throw the cloths away after use but do not try to flush them down a  toilet.      • Open and remove one cloth at a time from the package.    • Leave the cloth unfolded and begin the bathing.  • Massage the skin with the cloths using gentle pressure to remove bacteria.  Do not scrub harshly.   • Follow the steps below with one 2% CHG cloth per area (6 total cloths).  • One cloth for neck, shoulders and chest.  • One cloth for both arms, hands, fingers and underarms (do underarms last).  • One cloth for the abdomen followed by groin.  • One cloth for right leg and foot including between the toes.  • One cloth for left leg and foot including between the toes.  • The last cloth is to be used for the back of the neck, back and buttocks.    Allow the CHG to air dry 3 minutes on the skin which will give it time to work and decrease the chance of irritation.  The skin may feel sticky until it is dry.  Do not rinse with water or any other liquid or you will lose the beneficial effects of the CHG.  If mild skin irritation occurs, do rinse the skin to remove the CHG.  Report this to the nurse at time of admission.  Do not apply lotions, creams, ointments, deodorants or perfumes after using the clothes. Dress in clean clothes before coming to the hospital.

## 2020-08-19 ENCOUNTER — TELEPHONE (OUTPATIENT)
Dept: SURGERY | Facility: CLINIC | Age: 45
End: 2020-08-19

## 2020-08-19 ENCOUNTER — LAB (OUTPATIENT)
Dept: LAB | Facility: HOSPITAL | Age: 45
End: 2020-08-19

## 2020-08-19 ENCOUNTER — APPOINTMENT (OUTPATIENT)
Dept: LAB | Facility: HOSPITAL | Age: 45
End: 2020-08-19

## 2020-08-19 DIAGNOSIS — Z01.818 OTHER SPECIFIED PRE-OPERATIVE EXAMINATION: ICD-10-CM

## 2020-08-19 PROCEDURE — U0004 COV-19 TEST NON-CDC HGH THRU: HCPCS

## 2020-08-19 PROCEDURE — C9803 HOPD COVID-19 SPEC COLLECT: HCPCS

## 2020-08-19 NOTE — TELEPHONE ENCOUNTER
----- Message from April Dorene Slaughter RN sent at 8/19/2020 10:11 AM EDT -----  Roxy Gaitan left me a vm this morning stating she is having second thoughts about her surgery scheduled for this Friday and would like for you to call her. Her number is [phone].  April     I called Ms. Calvert as requested.  I had both the patient and her  on the phone at the same time.  I answered all of their questions to their satisfaction.  We discussed the surgery as well as alternative forms of therapy and their prognosis.  Risks and benefits were also discussed.  After our discussion the patient has requested that we proceed with surgery.  She is scheduled for this Friday.

## 2020-08-20 ENCOUNTER — ANESTHESIA EVENT (OUTPATIENT)
Dept: PERIOP | Facility: HOSPITAL | Age: 45
End: 2020-08-20

## 2020-08-20 LAB
REF LAB TEST METHOD: NORMAL
SARS-COV-2 RNA RESP QL NAA+PROBE: NOT DETECTED

## 2020-08-21 ENCOUNTER — ANESTHESIA (OUTPATIENT)
Dept: PERIOP | Facility: HOSPITAL | Age: 45
End: 2020-08-21

## 2020-08-21 ENCOUNTER — HOSPITAL ENCOUNTER (INPATIENT)
Facility: HOSPITAL | Age: 45
LOS: 3 days | Discharge: HOME | End: 2020-08-24
Attending: THORACIC SURGERY (CARDIOTHORACIC VASCULAR SURGERY) | Admitting: THORACIC SURGERY (CARDIOTHORACIC VASCULAR SURGERY)

## 2020-08-21 ENCOUNTER — APPOINTMENT (OUTPATIENT)
Dept: GENERAL RADIOLOGY | Facility: HOSPITAL | Age: 45
End: 2020-08-21

## 2020-08-21 DIAGNOSIS — R91.8 PULMONARY NODULES/LESIONS, MULTIPLE: ICD-10-CM

## 2020-08-21 LAB
ABO GROUP BLD: NORMAL
B-HCG UR QL: NEGATIVE
BLD GP AB SCN SERPL QL: NEGATIVE
INTERNAL NEGATIVE CONTROL: NEGATIVE
INTERNAL POSITIVE CONTROL: POSITIVE
Lab: NORMAL
RH BLD: POSITIVE
T&S EXPIRATION DATE: NORMAL

## 2020-08-21 PROCEDURE — 0BBB4ZZ EXCISION OF LEFT LOWER LOBE BRONCHUS, PERCUTANEOUS ENDOSCOPIC APPROACH: ICD-10-PCS | Performed by: THORACIC SURGERY (CARDIOTHORACIC VASCULAR SURGERY)

## 2020-08-21 PROCEDURE — 25010000003 BUPIVACAINE LIPOSOME 1.3 % SUSPENSION: Performed by: ANESTHESIOLOGY

## 2020-08-21 PROCEDURE — 86850 RBC ANTIBODY SCREEN: CPT | Performed by: ANESTHESIOLOGY

## 2020-08-21 PROCEDURE — 86920 COMPATIBILITY TEST SPIN: CPT

## 2020-08-21 PROCEDURE — 8E0W4CZ ROBOTIC ASSISTED PROCEDURE OF TRUNK REGION, PERCUTANEOUS ENDOSCOPIC APPROACH: ICD-10-PCS | Performed by: THORACIC SURGERY (CARDIOTHORACIC VASCULAR SURGERY)

## 2020-08-21 PROCEDURE — 88305 TISSUE EXAM BY PATHOLOGIST: CPT | Performed by: THORACIC SURGERY (CARDIOTHORACIC VASCULAR SURGERY)

## 2020-08-21 PROCEDURE — 07B70ZX EXCISION OF THORAX LYMPHATIC, OPEN APPROACH, DIAGNOSTIC: ICD-10-PCS | Performed by: THORACIC SURGERY (CARDIOTHORACIC VASCULAR SURGERY)

## 2020-08-21 PROCEDURE — 32666 THORACOSCOPY W/WEDGE RESECT: CPT | Performed by: THORACIC SURGERY (CARDIOTHORACIC VASCULAR SURGERY)

## 2020-08-21 PROCEDURE — 87176 TISSUE HOMOGENIZATION CULTR: CPT | Performed by: THORACIC SURGERY (CARDIOTHORACIC VASCULAR SURGERY)

## 2020-08-21 PROCEDURE — 87205 SMEAR GRAM STAIN: CPT | Performed by: THORACIC SURGERY (CARDIOTHORACIC VASCULAR SURGERY)

## 2020-08-21 PROCEDURE — 25010000002 DEXAMETHASONE PER 1 MG: Performed by: NURSE ANESTHETIST, CERTIFIED REGISTERED

## 2020-08-21 PROCEDURE — 71045 X-RAY EXAM CHEST 1 VIEW: CPT

## 2020-08-21 PROCEDURE — 25010000002 NEOSTIGMINE PER 0.5 MG: Performed by: NURSE ANESTHETIST, CERTIFIED REGISTERED

## 2020-08-21 PROCEDURE — 25010000002 FENTANYL CITRATE (PF) 100 MCG/2ML SOLUTION: Performed by: ANESTHESIOLOGY

## 2020-08-21 PROCEDURE — 25010000002 PROPOFOL 10 MG/ML EMULSION: Performed by: NURSE ANESTHETIST, CERTIFIED REGISTERED

## 2020-08-21 PROCEDURE — 25010000002 PHENYLEPHRINE PER 1 ML: Performed by: NURSE ANESTHETIST, CERTIFIED REGISTERED

## 2020-08-21 PROCEDURE — 25010000003 BUPIVACAINE LIPOSOME 1.3 % SUSPENSION 20 ML VIAL: Performed by: THORACIC SURGERY (CARDIOTHORACIC VASCULAR SURGERY)

## 2020-08-21 PROCEDURE — 25010000002 MAGNESIUM SULFATE PER 500 MG OF MAGNESIUM: Performed by: NURSE ANESTHETIST, CERTIFIED REGISTERED

## 2020-08-21 PROCEDURE — 81025 URINE PREGNANCY TEST: CPT | Performed by: ANESTHESIOLOGY

## 2020-08-21 PROCEDURE — 76942 ECHO GUIDE FOR BIOPSY: CPT | Performed by: THORACIC SURGERY (CARDIOTHORACIC VASCULAR SURGERY)

## 2020-08-21 PROCEDURE — 25010000003 CEFAZOLIN IN DEXTROSE 2-4 GM/100ML-% SOLUTION: Performed by: THORACIC SURGERY (CARDIOTHORACIC VASCULAR SURGERY)

## 2020-08-21 PROCEDURE — 87070 CULTURE OTHR SPECIMN AEROBIC: CPT | Performed by: THORACIC SURGERY (CARDIOTHORACIC VASCULAR SURGERY)

## 2020-08-21 PROCEDURE — 25010000002 ONDANSETRON PER 1 MG: Performed by: NURSE ANESTHETIST, CERTIFIED REGISTERED

## 2020-08-21 PROCEDURE — 86922 COMPATIBILITY TEST ANTIGLOB: CPT

## 2020-08-21 PROCEDURE — 0WBC4ZX EXCISION OF MEDIASTINUM, PERCUTANEOUS ENDOSCOPIC APPROACH, DIAGNOSTIC: ICD-10-PCS | Performed by: THORACIC SURGERY (CARDIOTHORACIC VASCULAR SURGERY)

## 2020-08-21 PROCEDURE — 32667 THORACOSCOPY W/W RESECT ADDL: CPT | Performed by: THORACIC SURGERY (CARDIOTHORACIC VASCULAR SURGERY)

## 2020-08-21 PROCEDURE — C9290 INJ, BUPIVACAINE LIPOSOME: HCPCS | Performed by: THORACIC SURGERY (CARDIOTHORACIC VASCULAR SURGERY)

## 2020-08-21 PROCEDURE — 0BBG4ZZ EXCISION OF LEFT UPPER LUNG LOBE, PERCUTANEOUS ENDOSCOPIC APPROACH: ICD-10-PCS | Performed by: THORACIC SURGERY (CARDIOTHORACIC VASCULAR SURGERY)

## 2020-08-21 PROCEDURE — 87075 CULTR BACTERIA EXCEPT BLOOD: CPT | Performed by: THORACIC SURGERY (CARDIOTHORACIC VASCULAR SURGERY)

## 2020-08-21 PROCEDURE — C1894 INTRO/SHEATH, NON-LASER: HCPCS | Performed by: THORACIC SURGERY (CARDIOTHORACIC VASCULAR SURGERY)

## 2020-08-21 PROCEDURE — 88307 TISSUE EXAM BY PATHOLOGIST: CPT | Performed by: THORACIC SURGERY (CARDIOTHORACIC VASCULAR SURGERY)

## 2020-08-21 PROCEDURE — 87102 FUNGUS ISOLATION CULTURE: CPT | Performed by: THORACIC SURGERY (CARDIOTHORACIC VASCULAR SURGERY)

## 2020-08-21 PROCEDURE — 86901 BLOOD TYPING SEROLOGIC RH(D): CPT | Performed by: ANESTHESIOLOGY

## 2020-08-21 PROCEDURE — 25010000002 MIDAZOLAM PER 1 MG: Performed by: ANESTHESIOLOGY

## 2020-08-21 PROCEDURE — 86900 BLOOD TYPING SEROLOGIC ABO: CPT | Performed by: ANESTHESIOLOGY

## 2020-08-21 PROCEDURE — 94799 UNLISTED PULMONARY SVC/PX: CPT

## 2020-08-21 PROCEDURE — 88331 PATH CONSLTJ SURG 1 BLK 1SPC: CPT | Performed by: THORACIC SURGERY (CARDIOTHORACIC VASCULAR SURGERY)

## 2020-08-21 PROCEDURE — C9290 INJ, BUPIVACAINE LIPOSOME: HCPCS | Performed by: ANESTHESIOLOGY

## 2020-08-21 DEVICE — SUREFORM 45 RELOAD GREEN
Type: IMPLANTABLE DEVICE | Status: FUNCTIONAL
Brand: SUREFORM

## 2020-08-21 RX ORDER — ONDANSETRON 2 MG/ML
4 INJECTION INTRAMUSCULAR; INTRAVENOUS ONCE AS NEEDED
Status: DISCONTINUED | OUTPATIENT
Start: 2020-08-21 | End: 2020-08-21 | Stop reason: HOSPADM

## 2020-08-21 RX ORDER — AMOXICILLIN 250 MG
2 CAPSULE ORAL NIGHTLY
Status: DISCONTINUED | OUTPATIENT
Start: 2020-08-21 | End: 2020-08-24 | Stop reason: HOSPADM

## 2020-08-21 RX ORDER — HYDROMORPHONE HYDROCHLORIDE 1 MG/ML
0.5 INJECTION, SOLUTION INTRAMUSCULAR; INTRAVENOUS; SUBCUTANEOUS
Status: DISCONTINUED | OUTPATIENT
Start: 2020-08-21 | End: 2020-08-24 | Stop reason: HOSPADM

## 2020-08-21 RX ORDER — NITROGLYCERIN 0.4 MG/1
0.4 TABLET SUBLINGUAL
Status: DISCONTINUED | OUTPATIENT
Start: 2020-08-21 | End: 2020-08-24 | Stop reason: HOSPADM

## 2020-08-21 RX ORDER — SODIUM CHLORIDE, SODIUM LACTATE, POTASSIUM CHLORIDE, CALCIUM CHLORIDE 600; 310; 30; 20 MG/100ML; MG/100ML; MG/100ML; MG/100ML
9 INJECTION, SOLUTION INTRAVENOUS CONTINUOUS
Status: DISCONTINUED | OUTPATIENT
Start: 2020-08-21 | End: 2020-08-22

## 2020-08-21 RX ORDER — GLYCOPYRROLATE 0.2 MG/ML
INJECTION INTRAMUSCULAR; INTRAVENOUS AS NEEDED
Status: DISCONTINUED | OUTPATIENT
Start: 2020-08-21 | End: 2020-08-21 | Stop reason: SURG

## 2020-08-21 RX ORDER — ONDANSETRON 4 MG/1
4 TABLET, FILM COATED ORAL EVERY 6 HOURS PRN
Status: DISCONTINUED | OUTPATIENT
Start: 2020-08-21 | End: 2020-08-24 | Stop reason: HOSPADM

## 2020-08-21 RX ORDER — BUPIVACAINE HYDROCHLORIDE 2.5 MG/ML
INJECTION, SOLUTION EPIDURAL; INFILTRATION; INTRACAUDAL
Status: COMPLETED | OUTPATIENT
Start: 2020-08-21 | End: 2020-08-21

## 2020-08-21 RX ORDER — SODIUM CHLORIDE 0.9 % (FLUSH) 0.9 %
3 SYRINGE (ML) INJECTION EVERY 12 HOURS SCHEDULED
Status: DISCONTINUED | OUTPATIENT
Start: 2020-08-21 | End: 2020-08-21 | Stop reason: HOSPADM

## 2020-08-21 RX ORDER — PROPOFOL 10 MG/ML
VIAL (ML) INTRAVENOUS AS NEEDED
Status: DISCONTINUED | OUTPATIENT
Start: 2020-08-21 | End: 2020-08-21 | Stop reason: SURG

## 2020-08-21 RX ORDER — CELECOXIB 100 MG/1
100 CAPSULE ORAL EVERY 12 HOURS SCHEDULED
Status: DISCONTINUED | OUTPATIENT
Start: 2020-08-21 | End: 2020-08-24 | Stop reason: HOSPADM

## 2020-08-21 RX ORDER — LIDOCAINE HYDROCHLORIDE 10 MG/ML
0.5 INJECTION, SOLUTION EPIDURAL; INFILTRATION; INTRACAUDAL; PERINEURAL ONCE AS NEEDED
Status: DISCONTINUED | OUTPATIENT
Start: 2020-08-21 | End: 2020-08-21 | Stop reason: HOSPADM

## 2020-08-21 RX ORDER — MULTIPLE VITAMINS W/ MINERALS TAB 9MG-400MCG
1 TAB ORAL DAILY
Status: DISCONTINUED | OUTPATIENT
Start: 2020-08-22 | End: 2020-08-24 | Stop reason: HOSPADM

## 2020-08-21 RX ORDER — MAGNESIUM HYDROXIDE 1200 MG/15ML
LIQUID ORAL AS NEEDED
Status: DISCONTINUED | OUTPATIENT
Start: 2020-08-21 | End: 2020-08-21 | Stop reason: HOSPADM

## 2020-08-21 RX ORDER — CEFAZOLIN SODIUM 2 G/100ML
2 INJECTION, SOLUTION INTRAVENOUS ONCE
Status: COMPLETED | OUTPATIENT
Start: 2020-08-21 | End: 2020-08-21

## 2020-08-21 RX ORDER — FENTANYL CITRATE 50 UG/ML
50 INJECTION, SOLUTION INTRAMUSCULAR; INTRAVENOUS
Status: DISCONTINUED | OUTPATIENT
Start: 2020-08-21 | End: 2020-08-21 | Stop reason: HOSPADM

## 2020-08-21 RX ORDER — HYDROMORPHONE HYDROCHLORIDE 1 MG/ML
0.25 INJECTION, SOLUTION INTRAMUSCULAR; INTRAVENOUS; SUBCUTANEOUS
Status: DISCONTINUED | OUTPATIENT
Start: 2020-08-21 | End: 2020-08-21 | Stop reason: HOSPADM

## 2020-08-21 RX ORDER — METOCLOPRAMIDE HYDROCHLORIDE 5 MG/ML
10 INJECTION INTRAMUSCULAR; INTRAVENOUS ONCE AS NEEDED
Status: DISCONTINUED | OUTPATIENT
Start: 2020-08-21 | End: 2020-08-21 | Stop reason: HOSPADM

## 2020-08-21 RX ORDER — BISACODYL 10 MG
10 SUPPOSITORY, RECTAL RECTAL DAILY PRN
Status: DISCONTINUED | OUTPATIENT
Start: 2020-08-21 | End: 2020-08-24 | Stop reason: HOSPADM

## 2020-08-21 RX ORDER — OXYCODONE HYDROCHLORIDE 5 MG/1
5 TABLET ORAL EVERY 4 HOURS PRN
Status: DISCONTINUED | OUTPATIENT
Start: 2020-08-21 | End: 2020-08-24 | Stop reason: HOSPADM

## 2020-08-21 RX ORDER — CELECOXIB 200 MG/1
200 CAPSULE ORAL ONCE
Status: COMPLETED | OUTPATIENT
Start: 2020-08-21 | End: 2020-08-21

## 2020-08-21 RX ORDER — SODIUM CHLORIDE 9 MG/ML
INJECTION, SOLUTION INTRAVENOUS AS NEEDED
Status: DISCONTINUED | OUTPATIENT
Start: 2020-08-21 | End: 2020-08-21 | Stop reason: HOSPADM

## 2020-08-21 RX ORDER — CEFAZOLIN SODIUM 2 G/100ML
2 INJECTION, SOLUTION INTRAVENOUS EVERY 8 HOURS
Status: COMPLETED | OUTPATIENT
Start: 2020-08-21 | End: 2020-08-21

## 2020-08-21 RX ORDER — ROCURONIUM BROMIDE 10 MG/ML
INJECTION, SOLUTION INTRAVENOUS AS NEEDED
Status: DISCONTINUED | OUTPATIENT
Start: 2020-08-21 | End: 2020-08-21 | Stop reason: SURG

## 2020-08-21 RX ORDER — LOSARTAN POTASSIUM 100 MG/1
100 TABLET ORAL DAILY
Status: DISCONTINUED | OUTPATIENT
Start: 2020-08-21 | End: 2020-08-24 | Stop reason: HOSPADM

## 2020-08-21 RX ORDER — ACETAMINOPHEN 500 MG
1000 TABLET ORAL ONCE
Status: COMPLETED | OUTPATIENT
Start: 2020-08-21 | End: 2020-08-21

## 2020-08-21 RX ORDER — LIDOCAINE HYDROCHLORIDE 20 MG/ML
INJECTION, SOLUTION INFILTRATION; PERINEURAL AS NEEDED
Status: DISCONTINUED | OUTPATIENT
Start: 2020-08-21 | End: 2020-08-21 | Stop reason: SURG

## 2020-08-21 RX ORDER — GABAPENTIN 300 MG/1
300 CAPSULE ORAL EVERY 8 HOURS SCHEDULED
Status: DISCONTINUED | OUTPATIENT
Start: 2020-08-21 | End: 2020-08-24 | Stop reason: HOSPADM

## 2020-08-21 RX ORDER — KETAMINE HYDROCHLORIDE 10 MG/ML
INJECTION INTRAMUSCULAR; INTRAVENOUS AS NEEDED
Status: DISCONTINUED | OUTPATIENT
Start: 2020-08-21 | End: 2020-08-21 | Stop reason: SURG

## 2020-08-21 RX ORDER — SODIUM CHLORIDE 0.9 % (FLUSH) 0.9 %
3-10 SYRINGE (ML) INJECTION AS NEEDED
Status: DISCONTINUED | OUTPATIENT
Start: 2020-08-21 | End: 2020-08-21 | Stop reason: HOSPADM

## 2020-08-21 RX ORDER — EPHEDRINE SULFATE 50 MG/ML
INJECTION, SOLUTION INTRAVENOUS AS NEEDED
Status: DISCONTINUED | OUTPATIENT
Start: 2020-08-21 | End: 2020-08-21 | Stop reason: SURG

## 2020-08-21 RX ORDER — DEXAMETHASONE SODIUM PHOSPHATE 10 MG/ML
INJECTION INTRAMUSCULAR; INTRAVENOUS AS NEEDED
Status: DISCONTINUED | OUTPATIENT
Start: 2020-08-21 | End: 2020-08-21 | Stop reason: SURG

## 2020-08-21 RX ORDER — NALOXONE HCL 0.4 MG/ML
0.4 VIAL (ML) INJECTION
Status: DISCONTINUED | OUTPATIENT
Start: 2020-08-21 | End: 2020-08-21 | Stop reason: HOSPADM

## 2020-08-21 RX ORDER — GABAPENTIN 300 MG/1
600 CAPSULE ORAL ONCE
Status: COMPLETED | OUTPATIENT
Start: 2020-08-21 | End: 2020-08-21

## 2020-08-21 RX ORDER — MAGNESIUM SULFATE HEPTAHYDRATE 500 MG/ML
INJECTION, SOLUTION INTRAMUSCULAR; INTRAVENOUS AS NEEDED
Status: DISCONTINUED | OUTPATIENT
Start: 2020-08-21 | End: 2020-08-21 | Stop reason: SURG

## 2020-08-21 RX ORDER — MIDAZOLAM HYDROCHLORIDE 1 MG/ML
1 INJECTION INTRAMUSCULAR; INTRAVENOUS
Status: COMPLETED | OUTPATIENT
Start: 2020-08-21 | End: 2020-08-21

## 2020-08-21 RX ORDER — BUSPIRONE HYDROCHLORIDE 15 MG/1
15 TABLET ORAL 3 TIMES DAILY
Status: DISCONTINUED | OUTPATIENT
Start: 2020-08-21 | End: 2020-08-24 | Stop reason: HOSPADM

## 2020-08-21 RX ORDER — DOCUSATE SODIUM 100 MG/1
100 CAPSULE, LIQUID FILLED ORAL DAILY
Status: DISCONTINUED | OUTPATIENT
Start: 2020-08-21 | End: 2020-08-24 | Stop reason: HOSPADM

## 2020-08-21 RX ORDER — DULOXETIN HYDROCHLORIDE 60 MG/1
60 CAPSULE, DELAYED RELEASE ORAL DAILY
Status: DISCONTINUED | OUTPATIENT
Start: 2020-08-22 | End: 2020-08-24 | Stop reason: HOSPADM

## 2020-08-21 RX ORDER — SODIUM CHLORIDE 9 MG/ML
75 INJECTION, SOLUTION INTRAVENOUS CONTINUOUS
Status: DISCONTINUED | OUTPATIENT
Start: 2020-08-21 | End: 2020-08-22

## 2020-08-21 RX ORDER — ONDANSETRON 2 MG/ML
INJECTION INTRAMUSCULAR; INTRAVENOUS AS NEEDED
Status: DISCONTINUED | OUTPATIENT
Start: 2020-08-21 | End: 2020-08-21 | Stop reason: SURG

## 2020-08-21 RX ORDER — ACETAMINOPHEN 500 MG
1000 TABLET ORAL 3 TIMES DAILY
Status: DISCONTINUED | OUTPATIENT
Start: 2020-08-21 | End: 2020-08-24 | Stop reason: HOSPADM

## 2020-08-21 RX ORDER — ONDANSETRON 2 MG/ML
4 INJECTION INTRAMUSCULAR; INTRAVENOUS EVERY 6 HOURS PRN
Status: DISCONTINUED | OUTPATIENT
Start: 2020-08-21 | End: 2020-08-24 | Stop reason: HOSPADM

## 2020-08-21 RX ORDER — FAMOTIDINE 10 MG/ML
20 INJECTION, SOLUTION INTRAVENOUS ONCE
Status: COMPLETED | OUTPATIENT
Start: 2020-08-21 | End: 2020-08-21

## 2020-08-21 RX ADMIN — MIDAZOLAM 1 MG: 1 INJECTION INTRAMUSCULAR; INTRAVENOUS at 06:42

## 2020-08-21 RX ADMIN — CEFAZOLIN SODIUM 2 G: 2 INJECTION, SOLUTION INTRAVENOUS at 07:46

## 2020-08-21 RX ADMIN — PHENYLEPHRINE HYDROCHLORIDE 200 MCG: 10 INJECTION INTRAVENOUS at 08:59

## 2020-08-21 RX ADMIN — NEOSTIGMINE METHYLSULFATE 3 MG: 1 INJECTION INTRAMUSCULAR; INTRAVENOUS; SUBCUTANEOUS at 09:16

## 2020-08-21 RX ADMIN — DOCUSATE SODIUM 100 MG: 100 CAPSULE, LIQUID FILLED ORAL at 12:13

## 2020-08-21 RX ADMIN — LOSARTAN POTASSIUM 100 MG: 100 TABLET, FILM COATED ORAL at 14:07

## 2020-08-21 RX ADMIN — CEFAZOLIN SODIUM 2 G: 2 INJECTION, SOLUTION INTRAVENOUS at 15:36

## 2020-08-21 RX ADMIN — PROPOFOL 140 MG: 10 INJECTION, EMULSION INTRAVENOUS at 07:39

## 2020-08-21 RX ADMIN — BUSPIRONE HYDROCHLORIDE 15 MG: 15 TABLET ORAL at 20:11

## 2020-08-21 RX ADMIN — GABAPENTIN 300 MG: 300 CAPSULE ORAL at 22:50

## 2020-08-21 RX ADMIN — BUPIVACAINE HYDROCHLORIDE 20 ML: 2.5 INJECTION, SOLUTION EPIDURAL; INFILTRATION; INTRACAUDAL; PERINEURAL at 07:10

## 2020-08-21 RX ADMIN — DEXAMETHASONE SODIUM PHOSPHATE 6 MG: 10 INJECTION INTRAMUSCULAR; INTRAVENOUS at 08:29

## 2020-08-21 RX ADMIN — GLYCOPYRROLATE 0.4 MG: 0.2 INJECTION INTRAMUSCULAR; INTRAVENOUS at 09:16

## 2020-08-21 RX ADMIN — CELECOXIB 100 MG: 100 CAPSULE ORAL at 20:11

## 2020-08-21 RX ADMIN — CEFAZOLIN SODIUM 2 G: 2 INJECTION, SOLUTION INTRAVENOUS at 22:49

## 2020-08-21 RX ADMIN — KETAMINE HYDROCHLORIDE 10 MG: 10 INJECTION INTRAMUSCULAR; INTRAVENOUS at 09:00

## 2020-08-21 RX ADMIN — CELECOXIB 200 MG: 200 CAPSULE ORAL at 06:31

## 2020-08-21 RX ADMIN — ACETAMINOPHEN 1000 MG: 500 TABLET, FILM COATED ORAL at 12:12

## 2020-08-21 RX ADMIN — GABAPENTIN 600 MG: 300 CAPSULE ORAL at 06:31

## 2020-08-21 RX ADMIN — MIDAZOLAM 1 MG: 1 INJECTION INTRAMUSCULAR; INTRAVENOUS at 06:45

## 2020-08-21 RX ADMIN — EPHEDRINE SULFATE 15 MG: 50 INJECTION INTRAVENOUS at 08:01

## 2020-08-21 RX ADMIN — DOCUSATE SODIUM 50MG AND SENNOSIDES 8.6MG 2 TABLET: 8.6; 5 TABLET, FILM COATED ORAL at 20:11

## 2020-08-21 RX ADMIN — FENTANYL CITRATE 100 MCG: 50 INJECTION INTRAMUSCULAR; INTRAVENOUS at 07:38

## 2020-08-21 RX ADMIN — MAGNESIUM SULFATE HEPTAHYDRATE 2 G: 500 INJECTION, SOLUTION INTRAMUSCULAR; INTRAVENOUS at 07:54

## 2020-08-21 RX ADMIN — FENTANYL CITRATE 50 MCG: 50 INJECTION INTRAMUSCULAR; INTRAVENOUS at 06:45

## 2020-08-21 RX ADMIN — ONDANSETRON HYDROCHLORIDE 4 MG: 2 SOLUTION INTRAMUSCULAR; INTRAVENOUS at 08:29

## 2020-08-21 RX ADMIN — KETAMINE HYDROCHLORIDE 10 MG: 10 INJECTION INTRAMUSCULAR; INTRAVENOUS at 08:39

## 2020-08-21 RX ADMIN — ACETAMINOPHEN 1000 MG: 500 TABLET ORAL at 06:31

## 2020-08-21 RX ADMIN — METOPROLOL TARTRATE 25 MG: 25 TABLET, FILM COATED ORAL at 16:31

## 2020-08-21 RX ADMIN — SODIUM CHLORIDE 75 ML/HR: 9 INJECTION, SOLUTION INTRAVENOUS at 11:32

## 2020-08-21 RX ADMIN — EPHEDRINE SULFATE 10 MG: 50 INJECTION INTRAVENOUS at 08:07

## 2020-08-21 RX ADMIN — FENTANYL CITRATE 50 MCG: 50 INJECTION INTRAMUSCULAR; INTRAVENOUS at 06:42

## 2020-08-21 RX ADMIN — GABAPENTIN 300 MG: 300 CAPSULE ORAL at 14:07

## 2020-08-21 RX ADMIN — BUPIVACAINE 20 ML: 13.3 INJECTION, SUSPENSION, LIPOSOMAL INFILTRATION at 07:10

## 2020-08-21 RX ADMIN — PHENYLEPHRINE HYDROCHLORIDE 200 MCG: 10 INJECTION INTRAVENOUS at 08:14

## 2020-08-21 RX ADMIN — SODIUM CHLORIDE, POTASSIUM CHLORIDE, SODIUM LACTATE AND CALCIUM CHLORIDE 9 ML/HR: 600; 310; 30; 20 INJECTION, SOLUTION INTRAVENOUS at 06:31

## 2020-08-21 RX ADMIN — ACETAMINOPHEN 1000 MG: 500 TABLET, FILM COATED ORAL at 20:12

## 2020-08-21 RX ADMIN — BUSPIRONE HYDROCHLORIDE 15 MG: 15 TABLET ORAL at 14:06

## 2020-08-21 RX ADMIN — POLYETHYLENE GLYCOL 3350 17 G: 17 POWDER, FOR SOLUTION ORAL at 12:13

## 2020-08-21 RX ADMIN — KETAMINE HYDROCHLORIDE 30 MG: 10 INJECTION INTRAMUSCULAR; INTRAVENOUS at 07:39

## 2020-08-21 RX ADMIN — ROCURONIUM BROMIDE 40 MG: 10 INJECTION INTRAVENOUS at 07:39

## 2020-08-21 RX ADMIN — LIDOCAINE HYDROCHLORIDE 60 MG: 20 INJECTION, SOLUTION INFILTRATION; PERINEURAL at 07:39

## 2020-08-21 RX ADMIN — CELECOXIB 100 MG: 100 CAPSULE ORAL at 12:13

## 2020-08-21 RX ADMIN — FAMOTIDINE 20 MG: 10 INJECTION INTRAVENOUS at 06:34

## 2020-08-21 NOTE — ADDENDUM NOTE
Addendum  created 08/21/20 1053 by Trey Fang MD    Attestation recorded in Intraprocedure, Intraprocedure Attestations filed

## 2020-08-21 NOTE — OP NOTE
THORACOSCOPY WITH DAVINCI ROBOT  Progress Note    Roxy Calvert  8/21/2020    Pre-op Diagnosis:   Pulmonary nodules/lesions, multiple [R91.8]       Post-Op Diagnosis Codes:     * Pulmonary nodules/lesions, multiple [R91.8]    Procedure/CPT® Codes:        Procedure(s):  BRONCHOSCOPY  ·  LEFT VIDEO ASSISTED THORACOSCOPY WITH DAVINCI ROBOT  ·  LEFT UPPER LOBE WEDGE RESECTION  ·  LEFT LOWER LOBE WEDGE RESECTION  ·  RESECTION OF MEDIASTINAL CYST  ·   INTERCOSTAL NERVE BLOCKS    Surgeon(s):  Gio Burnett III, MD    Anesthesia: General with Block    Staff:   Cell Saver : Patrica Rocha  Circulator: Trupti Chavez RN; Maximo Hollingsworth RN  Scrub Person: Ariana Darby  Assistant: Emilie Moran CRNFA  Orientee: Lam Charles  Assistant: Emilie Moran CRNFA      Estimated Blood Loss: 50 mL    Urine Voided: * No values recorded between 8/21/2020  7:28 AM and 8/21/2020  9:42 AM *    Specimens:                Specimens     ID Source Type Tests Collected By Collected At Frozen?      1 Lymph Node Tissue · ANAEROBIC CULTURE  · FUNGAL CULTURE  · TISSUE / BONE CULTURE   Gio Burnett III, MD 8/21/20 0837      Description: L9 LYMPH NODE FOR CULTURES    A Lymph Node Tissue · TISSUE PATHOLOGY EXAM   Gio Burnett III, MD 8/21/20 0827 Yes     Description: L9 LYMPH NODE, OR 8 PHONE #[phone]    B Lung, Left Upper Lobe Tissue · TISSUE PATHOLOGY EXAM   Gio Burnett III, MD 8/21/20 0835 Yes     Description: LEFT UPPER LOBE WEDGE RESECTION, OR 8 PHONE#[phone]    C Mediastinum Tissue · TISSUE PATHOLOGY EXAM   Gio Burnett III, MD 8/21/20 0848 No     Description: MEDIASTINAL CYST    D Lung, Left Lower Lobe Tissue · TISSUE PATHOLOGY EXAM   Gio Burnett III, MD 8/21/20 0858 Yes     Description: LEFT LOWER LOBE WEDGE, OR 8 PHONE# [phone]                Drains:   Chest Tube Left (Active)       Findings: Wedge resection of both left upper lobe and left lower lobe showed only alveolar hyperplasia no  malignancy.  R9 lymph node was benign.  Benign mediastinal cyst resected completely.    Complications: none    Summary of procedure: Ms Calvert was brought to the operating room and placed on the operating table in the supine position.  Following the induction of adequate general endotracheal anesthesia the flexible bronchoscope was passed down the endotracheal tube.  Distal trachea and yuan appeared normal.  Yuan was sharp and nondisplaced.  Right mainstem bronchus, right upper lobe, right middle lobe, and right lower lobe bronchi showed no gross endobronchial lesions or mucosal abnormalities.  The scope was then passed down the left main bronchus.  Left upper lobe and left lower lobe bronchi showed no endobronchial lesions or mucosal abnormalities.  The scope was then used to position the double-lumen tube in the left main bronchus.  Once the tube was in good position the patient was turned into the right lateral decubitus position.  All pressure points were padded.  A roll was placed in the right axilla.  Patient was secured to the operating table with 3 inch adhesive tape and Velcro safety strap.    The left chest was then prepped and draped in usual sterile manner.  The left lung was deflated.  A port site was created in the eighth intercostal space posterior axillary line.  CO2 was insufflated into the chest.  The lungs appeared grossly normal.  There is no contraindication to proceeding on with surgery.  4 other port sites were created.  The da Dinorah robot was brought into the operative field and docked to the ports.  Instruments were then passed into the pleural space under direct vision.  At this point I broke scrub and went to the robot console to conduct the operation.  My assistant stated the bedside to pass and manipulate instruments.    Began by dividing the inferior pulmonary ligament large lymph node in the R9 station was harvested and sent for cultures as well as frozen section.  Frozen section  showed benign lymph node.  The lung was now manipulated.  A small lesion was identified in the apical aspect of the left upper lobe.  This was wedged out with 2 passes of the robot stapler using the green cartridge.  This was removed through the working port and sent for frozen section.  Alveolar hyperplasia was identified but no malignancy.  A second lesion in the apical aspect of the superior segment of the left lower lobe was wedged out with 2 passes of the robot stapler using the green cartridges.  This was also removed through the working port and sent for frozen section.  Frozen section showed alveolar hyperplasia.  The posterior mediastinal mass identified on CT scan was now identified.  This was about the level of the aortic arch along the spine.  This was resected completely and sent for permanent section.  It had the appearance of a benign cyst.    Instruments were removed from the patient under direct vision.  The robot was undocked and removed out of the field.  I scrubbed back into the operative field.  The staple lines and resection area were all sprayed with platelet rich plasma.  Intercostal nerve blocks were performed with Exparel.  A single #28 chest tube was passed through the camera port site and directed to the apex of the chest.  This tube was secured to the chest wall with a suture of 0 silk.  Sponge instrument and needle counts were correct.  All port sites were closed in layers with 2-0 Vicryl and the skin with 4-0 Vicryl.  Dry sterile dressings were applied.  Patient was awakened and extubated in the operating room and transported to the recovery room in satisfactory condition having tolerated the procedure well.    Assistant: Emilie Moran CRNFA  was responsible for performing the following activities: Retraction, Suction, Irrigation, Suturing, Closing and Placing Dressing and their skilled assistance was necessary for the success of this case.    .        Dictated utilizing Thea  dictation    Gio Burnett III, MD     Date: 8/21/2020  Time: 09:59

## 2020-08-21 NOTE — PLAN OF CARE
Problem: Patient Care Overview  Goal: Plan of Care Review  Flowsheets (Taken 8/21/2020 1923)  Progress: improving  Plan of Care Reviewed With: patient  Outcome Summary: VSS. pain controlled with ERAS protocal. chest tube continues at -20 suction, elevated HR order recieved for lopressor 25mg BID. Patient voiding adequately, continues pulmonary toliet. will continue to montior     Problem: Chest Tube Drainage Device (Adult)  Goal: Signs and Symptoms of Listed Potential Problems Will be Absent, Minimized or Managed (Chest Tube Drainage Device)  Flowsheets (Taken 8/21/2020 1923)  Problems Assessed (Chest Tube Drainage Device): all  Problems Present (Chest Tube Drain Dev): none

## 2020-08-21 NOTE — ANESTHESIA POSTPROCEDURE EVALUATION
Patient: Roxy Calvert    Procedure Summary     Date:  08/21/20 Room / Location:  [facility] OR  / [facility] MAIN OR    Anesthesia Start:  0731 Anesthesia Stop:  0948    Procedure:  BRONCHOSCOPY, LEFT VIDEO ASSISTED THORACOSCOPY WITH DAVINCI ROBOT ASSISTED LEFT UPPER LOBE WEDGE RESECTION, LEFT LOWER LOBE WEDGE RESECTION, RESECTION OF MEDIASTINAL CYST,  INTERCOSTAL NERVE BLOCKS (Left Chest) Diagnosis:       Pulmonary nodules/lesions, multiple      (Pulmonary nodules/lesions, multiple [R91.8])    Surgeon:  Gio Burnett III, MD Provider:  Trey Fang MD    Anesthesia Type:  general ASA Status:  3          Anesthesia Type: general    Vitals  Vitals Value Taken Time   /79 8/21/2020 10:15 AM   Temp 36.4 °C (97.5 °F) 8/21/2020  9:46 AM   Pulse 95 8/21/2020 10:27 AM   Resp 16 8/21/2020 10:15 AM   SpO2 100 % 8/21/2020 10:27 AM   Vitals shown include unvalidated device data.        Post Anesthesia Care and Evaluation    Patient location during evaluation: PACU  Patient participation: complete - patient participated  Level of consciousness: awake  Pain score: 2  Pain management: adequate  Airway patency: patent  Anesthetic complications: No anesthetic complications    Cardiovascular status: acceptable  Respiratory status: acceptable  Hydration status: acceptable

## 2020-08-21 NOTE — ANESTHESIA PROCEDURE NOTES
Airway  Urgency: elective    Date/Time: 8/21/2020 7:42 AM  Difficult airway    General Information and Staff    Patient location during procedure: OR  CRNA: Rebkea Urbano CRNA    Indications and Patient Condition  Indications for airway management: airway protection    Preoxygenated: yes  Mask difficulty assessment: 1 - vent by mask    Final Airway Details  Final airway type: endotracheal airway      Successful airway: ETT - double lumen left  Cuffed: yes   Successful intubation technique: direct laryngoscopy  Blade: Dandre  Blade size: 3  Cormack-Lehane Classification: grade III - view of epiglottis only  Placement verified by: chest auscultation, bronchoscopy and capnometry   Measured from: lips  ETT/EBT  to lips (cm): 28  Number of attempts at approach: 1  Assessment: lips, teeth, and gum same as pre-op and atraumatic intubation    Additional Comments  Smooth IV induction. Trachea intubated. Cuff up. BEBS.  FOB per surgeon for placement. ETT secured. BEBS

## 2020-08-21 NOTE — ANESTHESIA PROCEDURE NOTES
Arterial Line      Patient reassessed immediately prior to procedure    Patient location during procedure: holding area  Start time: 8/21/2020 6:40 AM  Stop Time:8/21/2020 6:45 AM       Line placed for hemodynamic monitoring.  Performed By   Anesthesiologist: Trey Fang MD  Preanesthetic Checklist  Completed: patient identified, site marked, surgical consent, pre-op evaluation, timeout performed, IV checked, risks and benefits discussed and monitors and equipment checked  Arterial Line Prep   Sterile Tech: mask and cap  Prep: ChloraPrep  Patient monitoring: blood pressure monitoring, continuous pulse oximetry and EKG  Arterial Line Procedure   Laterality:right  Location:  radial artery  Catheter size: 20 G   Guidance: landmark technique  Number of attempts: 1  Successful placement: yes  Post Assessment   Dressing Type: occlusive dressing applied, secured with tape and wrist guard applied.   Complications no  Circ/Move/Sens Assessment: unchanged.   Patient Tolerance: patient tolerated the procedure well with no apparent complications

## 2020-08-21 NOTE — ANESTHESIA PROCEDURE NOTES
Peripheral Block      Patient reassessed immediately prior to procedure    Patient location during procedure: holding area  Start time: 8/21/2020 6:55 AM  Stop time: 8/21/2020 7:10 AM  Reason for block: at surgeon's request and post-op pain management  Performed by  Anesthesiologist: Trey Fang MD  Preanesthetic Checklist  Completed: patient identified, site marked, surgical consent, pre-op evaluation, timeout performed, IV checked, risks and benefits discussed and monitors and equipment checked  Prep:  Pt Position: sitting  Sterile barriers:gloves, cap and mask  Prep: ChloraPrep  Patient monitoring: blood pressure monitoring, continuous pulse oximetry and EKG  Procedure  Sedation:yes    Guidance:ultrasound guided  ULTRASOUND INTERPRETATION. Using ultrasound guidance a 22 G gauge needle was placed in close proximity to the nerve, at which point, under ultrasound guidance anesthetic was injected in the area of the nerve and spread of the anesthesia was seen on ultrasound in close proximity thereto.  There were no abnormalities seen on ultrasound; a digital image was taken; and the patient tolerated the procedure with no complications. Images:still images obtained    Laterality:left  Block Type:erector spinae block  Injection Technique:single-shot  Needle Type:echogenic  Needle Gauge:21 G      Medications Used: bupivacaine PF (MARCAINE) 0.25 % injection, 20 mL  bupivacaine liposome (EXPAREL) 1.3 % injection, 20 mL  Med admintered at 8/21/2020 7:10 AM      Post Assessment  Injection Assessment: incremental injection, no paresthesia on injection and negative aspiration for heme  Patient Tolerance:comfortable throughout block  Complications:no

## 2020-08-21 NOTE — ANESTHESIA PREPROCEDURE EVALUATION
Anesthesia Evaluation     Patient summary reviewed and Nursing notes reviewed   history of anesthetic complications: PONV  NPO Solid Status: > 8 hours  NPO Liquid Status: > 8 hours           Airway   Mallampati: II  Neck ROM: full  No difficulty expected  Dental - normal exam     Pulmonary    Cardiovascular     Rhythm: regular    (+) hypertension,       Neuro/Psych  (+) psychiatric history Anxiety,     GI/Hepatic/Renal/Endo      Musculoskeletal     Abdominal    Substance History      OB/GYN          Other                        Anesthesia Plan    ASA 3     general   (A line, MAYCOL)  intravenous induction     Anesthetic plan, all risks, benefits, and alternatives have been provided, discussed and informed consent has been obtained with: patient.

## 2020-08-22 ENCOUNTER — APPOINTMENT (OUTPATIENT)
Dept: GENERAL RADIOLOGY | Facility: HOSPITAL | Age: 45
End: 2020-08-22

## 2020-08-22 LAB
ANION GAP SERPL CALCULATED.3IONS-SCNC: 5.5 MMOL/L (ref 5–15)
BASOPHILS # BLD AUTO: 0.05 10*3/MM3 (ref 0–0.2)
BASOPHILS NFR BLD AUTO: 0.3 % (ref 0–1.5)
BUN SERPL-MCNC: 8 MG/DL (ref 6–20)
BUN/CREAT SERPL: 16.7 (ref 7–25)
CALCIUM SPEC-SCNC: 8.1 MG/DL (ref 8.6–10.5)
CHLORIDE SERPL-SCNC: 109 MMOL/L (ref 98–107)
CO2 SERPL-SCNC: 23.5 MMOL/L (ref 22–29)
CREAT SERPL-MCNC: 0.48 MG/DL (ref 0.57–1)
DEPRECATED RDW RBC AUTO: 39.1 FL (ref 37–54)
EOSINOPHIL # BLD AUTO: 0.02 10*3/MM3 (ref 0–0.4)
EOSINOPHIL NFR BLD AUTO: 0.1 % (ref 0.3–6.2)
ERYTHROCYTE [DISTWIDTH] IN BLOOD BY AUTOMATED COUNT: 12 % (ref 12.3–15.4)
GFR SERPL CREATININE-BSD FRML MDRD: 141 ML/MIN/1.73
GLUCOSE SERPL-MCNC: 122 MG/DL (ref 65–99)
HCT VFR BLD AUTO: 31 % (ref 34–46.6)
HGB BLD-MCNC: 10.7 G/DL (ref 12–15.9)
IMM GRANULOCYTES # BLD AUTO: 0.07 10*3/MM3 (ref 0–0.05)
IMM GRANULOCYTES NFR BLD AUTO: 0.5 % (ref 0–0.5)
LYMPHOCYTES # BLD AUTO: 2.65 10*3/MM3 (ref 0.7–3.1)
LYMPHOCYTES NFR BLD AUTO: 17.4 % (ref 19.6–45.3)
MCH RBC QN AUTO: 30.8 PG (ref 26.6–33)
MCHC RBC AUTO-ENTMCNC: 34.5 G/DL (ref 31.5–35.7)
MCV RBC AUTO: 89.3 FL (ref 79–97)
MONOCYTES # BLD AUTO: 1.19 10*3/MM3 (ref 0.1–0.9)
MONOCYTES NFR BLD AUTO: 7.8 % (ref 5–12)
NEUTROPHILS NFR BLD AUTO: 11.24 10*3/MM3 (ref 1.7–7)
NEUTROPHILS NFR BLD AUTO: 73.9 % (ref 42.7–76)
NRBC BLD AUTO-RTO: 0 /100 WBC (ref 0–0.2)
PLATELET # BLD AUTO: 318 10*3/MM3 (ref 140–450)
PMV BLD AUTO: 9 FL (ref 6–12)
POTASSIUM SERPL-SCNC: 3.9 MMOL/L (ref 3.5–5.2)
RBC # BLD AUTO: 3.47 10*6/MM3 (ref 3.77–5.28)
SODIUM SERPL-SCNC: 138 MMOL/L (ref 136–145)
WBC # BLD AUTO: 15.22 10*3/MM3 (ref 3.4–10.8)

## 2020-08-22 PROCEDURE — 99024 POSTOP FOLLOW-UP VISIT: CPT | Performed by: THORACIC SURGERY (CARDIOTHORACIC VASCULAR SURGERY)

## 2020-08-22 PROCEDURE — 86900 BLOOD TYPING SEROLOGIC ABO: CPT

## 2020-08-22 PROCEDURE — 25010000002 HYDROMORPHONE PER 4 MG: Performed by: THORACIC SURGERY (CARDIOTHORACIC VASCULAR SURGERY)

## 2020-08-22 PROCEDURE — 86901 BLOOD TYPING SEROLOGIC RH(D): CPT

## 2020-08-22 PROCEDURE — 25010000002 ENOXAPARIN PER 10 MG: Performed by: THORACIC SURGERY (CARDIOTHORACIC VASCULAR SURGERY)

## 2020-08-22 PROCEDURE — 71045 X-RAY EXAM CHEST 1 VIEW: CPT

## 2020-08-22 PROCEDURE — 85025 COMPLETE CBC W/AUTO DIFF WBC: CPT | Performed by: THORACIC SURGERY (CARDIOTHORACIC VASCULAR SURGERY)

## 2020-08-22 PROCEDURE — 80048 BASIC METABOLIC PNL TOTAL CA: CPT | Performed by: THORACIC SURGERY (CARDIOTHORACIC VASCULAR SURGERY)

## 2020-08-22 PROCEDURE — 94799 UNLISTED PULMONARY SVC/PX: CPT

## 2020-08-22 PROCEDURE — 25010000002 ONDANSETRON PER 1 MG: Performed by: THORACIC SURGERY (CARDIOTHORACIC VASCULAR SURGERY)

## 2020-08-22 RX ADMIN — OXYCODONE 5 MG: 5 TABLET ORAL at 10:10

## 2020-08-22 RX ADMIN — BUSPIRONE HYDROCHLORIDE 15 MG: 15 TABLET ORAL at 15:52

## 2020-08-22 RX ADMIN — GABAPENTIN 300 MG: 300 CAPSULE ORAL at 21:03

## 2020-08-22 RX ADMIN — HYDROMORPHONE HYDROCHLORIDE 0.5 MG: 1 INJECTION, SOLUTION INTRAMUSCULAR; INTRAVENOUS; SUBCUTANEOUS at 21:43

## 2020-08-22 RX ADMIN — MULTIPLE VITAMINS W/ MINERALS TAB 1 TABLET: TAB at 08:24

## 2020-08-22 RX ADMIN — POLYETHYLENE GLYCOL 3350 17 G: 17 POWDER, FOR SOLUTION ORAL at 08:25

## 2020-08-22 RX ADMIN — BUSPIRONE HYDROCHLORIDE 15 MG: 15 TABLET ORAL at 21:02

## 2020-08-22 RX ADMIN — HYDROMORPHONE HYDROCHLORIDE 0.5 MG: 1 INJECTION, SOLUTION INTRAMUSCULAR; INTRAVENOUS; SUBCUTANEOUS at 01:26

## 2020-08-22 RX ADMIN — CELECOXIB 100 MG: 100 CAPSULE ORAL at 21:03

## 2020-08-22 RX ADMIN — DOCUSATE SODIUM 100 MG: 100 CAPSULE, LIQUID FILLED ORAL at 08:24

## 2020-08-22 RX ADMIN — DOCUSATE SODIUM 50MG AND SENNOSIDES 8.6MG 2 TABLET: 8.6; 5 TABLET, FILM COATED ORAL at 21:02

## 2020-08-22 RX ADMIN — GABAPENTIN 300 MG: 300 CAPSULE ORAL at 14:04

## 2020-08-22 RX ADMIN — ENOXAPARIN SODIUM 40 MG: 40 INJECTION SUBCUTANEOUS at 08:25

## 2020-08-22 RX ADMIN — HYDROMORPHONE HYDROCHLORIDE 0.5 MG: 1 INJECTION, SOLUTION INTRAMUSCULAR; INTRAVENOUS; SUBCUTANEOUS at 18:43

## 2020-08-22 RX ADMIN — OXYCODONE 5 MG: 5 TABLET ORAL at 17:56

## 2020-08-22 RX ADMIN — ONDANSETRON 4 MG: 2 INJECTION INTRAMUSCULAR; INTRAVENOUS at 05:34

## 2020-08-22 RX ADMIN — ONDANSETRON 4 MG: 2 INJECTION INTRAMUSCULAR; INTRAVENOUS at 18:10

## 2020-08-22 RX ADMIN — METOPROLOL TARTRATE 25 MG: 25 TABLET, FILM COATED ORAL at 21:03

## 2020-08-22 RX ADMIN — SODIUM CHLORIDE 75 ML/HR: 9 INJECTION, SOLUTION INTRAVENOUS at 01:06

## 2020-08-22 RX ADMIN — GABAPENTIN 300 MG: 300 CAPSULE ORAL at 05:34

## 2020-08-22 RX ADMIN — OXYCODONE 5 MG: 5 TABLET ORAL at 14:04

## 2020-08-22 RX ADMIN — DULOXETINE HYDROCHLORIDE 60 MG: 60 CAPSULE, DELAYED RELEASE ORAL at 08:24

## 2020-08-22 RX ADMIN — ACETAMINOPHEN 1000 MG: 500 TABLET, FILM COATED ORAL at 08:24

## 2020-08-22 RX ADMIN — HYDROMORPHONE HYDROCHLORIDE 0.5 MG: 1 INJECTION, SOLUTION INTRAMUSCULAR; INTRAVENOUS; SUBCUTANEOUS at 08:25

## 2020-08-22 RX ADMIN — METOPROLOL TARTRATE 25 MG: 25 TABLET, FILM COATED ORAL at 08:25

## 2020-08-22 RX ADMIN — ACETAMINOPHEN 1000 MG: 500 TABLET, FILM COATED ORAL at 21:03

## 2020-08-22 RX ADMIN — CELECOXIB 100 MG: 100 CAPSULE ORAL at 08:24

## 2020-08-22 RX ADMIN — BUSPIRONE HYDROCHLORIDE 15 MG: 15 TABLET ORAL at 08:24

## 2020-08-22 RX ADMIN — ACETAMINOPHEN 1000 MG: 500 TABLET, FILM COATED ORAL at 15:52

## 2020-08-22 NOTE — PROGRESS NOTES
"    Chief Complaint: Multiple pulmonary nodules, postoperative management  S/P:   ·  LEFT VIDEO ASSISTED THORACOSCOPY WITH DAVINCI ROBOT  ·  LEFT UPPER LOBE WEDGE RESECTION  ·  LEFT LOWER LOBE WEDGE RESECTION  ·  RESECTION OF MEDIASTINAL CYST  ·   INTERCOSTAL NERVE BLOCKS  POD # 1    Subjective:  Symptoms:  Stable.  She reports chest pain.  No shortness of breath.    Diet:  Adequate intake.  No nausea or vomiting.    Activity level: Returning to normal.    Pain:  She complains of pain that is mild.  Pain is well controlled.        Vital Signs:  Temp:  [96.8 °F (36 °C)-98.1 °F (36.7 °C)] 96.8 °F (36 °C)  Heart Rate:  [] 61  Resp:  [14-17] 14  BP: ()/(59-77) 106/69  Arterial Line BP: (101-117)/(54-69) 117/65    Intake & Output (last day)       08/21 0701 - 08/22 0700 08/22 0701 - 08/23 0700    P.O. 580     I.V. (mL/kg) 1000 (16.1)     Total Intake(mL/kg) 1580 (25.4)     Urine (mL/kg/hr) 2400 (1.6)     Blood 50     Chest Tube 157     Total Output 2607     Net -1027                 Objective:  General Appearance:  Comfortable, well-appearing and in no acute distress.    Vital signs: (most recent): Blood pressure 106/69, pulse 61, temperature 96.8 °F (36 °C), temperature source Temporal, resp. rate 14, height 156.2 cm (61.5\"), weight 62.1 kg (137 lb), SpO2 92 %, not currently breastfeeding.  No fever.    Output: Producing urine.    Lungs:  Normal effort and normal respiratory rate.  Breath sounds clear to auscultation.    Heart: Tachycardia.  No murmur.   Abdomen: Abdomen is soft.  Bowel sounds are normal.   There is no abdominal tenderness.   There is no mass.   Extremities: There is no dependent edema.    Neurological: Patient is alert and oriented to person, place and time.  Normal strength.              Chest tube:   Site: Left, Clean, Dry, Intact and Securement device intact  Suction: waterseal  Air Leak: negative  24 Hour Total: 157 ml    Results Review:     I reviewed the patient's new clinical " results.  I reviewed the patient's new imaging results and agree with the interpretation.    Imaging Results (Last 24 Hours)     Procedure Component Value Units Date/Time    XR Chest 1 View [[id number]] Collected:  08/22/20 0715     Updated:  08/22/20 0829    Narrative:       ONE VIEW PORTABLE CHEST AT 5:20 AM     HISTORY: Recent left chest surgery. Chest tube.     A left-sided chest tube remains in place and there is a suspicion of a  very tiny left apical pneumothorax unchanged from yesterday. The lungs  are clear except for some minimal atelectasis at the bases that is  unchanged. The heart size remains normal.     This report was finalized on 8/22/2020 8:26 AM by Dr. Quinn Myers M.D.             Lab Results:     Lab Results (last 24 hours)     Procedure Component Value Units Date/Time    Tissue / Bone Culture - Tissue, Lymph Node [[id number]] Collected:  08/21/20 0837    Specimen:  Tissue from Lymph Node Updated:  08/22/20 0836     Tissue Culture No growth     Gram Stain No WBCs or organisms seen    Basic Metabolic Panel [[id number]]  (Abnormal) Collected:  08/22/20 0448    Specimen:  Blood Updated:  08/22/20 0608     Glucose 122 mg/dL      BUN 8 mg/dL      Creatinine 0.48 mg/dL      Sodium 138 mmol/L      Potassium 3.9 mmol/L      Chloride 109 mmol/L      CO2 23.5 mmol/L      Calcium 8.1 mg/dL      eGFR Non African Amer 141 mL/min/1.73      BUN/Creatinine Ratio 16.7     Anion Gap 5.5 mmol/L     Narrative:       GFR Normal >60  Chronic Kidney Disease <60  Kidney Failure <15      CBC & Differential [[id number]] Collected:  08/22/20 0448    Specimen:  Blood Updated:  08/22/20 0546    Narrative:       The following orders were created for panel order CBC & Differential.  Procedure                               Abnormality         Status                     ---------                               -----------         ------                     CBC Auto Differential[[id number]]        Abnormal            Final result                  Please view results for these tests on the individual orders.    CBC Auto Differential [[id number]]  (Abnormal) Collected:  08/22/20 0448    Specimen:  Blood Updated:  08/22/20 0546     WBC 15.22 10*3/mm3      RBC 3.47 10*6/mm3      Hemoglobin 10.7 g/dL      Hematocrit 31.0 %      MCV 89.3 fL      MCH 30.8 pg      MCHC 34.5 g/dL      RDW 12.0 %      RDW-SD 39.1 fl      MPV 9.0 fL      Platelets 318 10*3/mm3      Neutrophil % 73.9 %      Lymphocyte % 17.4 %      Monocyte % 7.8 %      Eosinophil % 0.1 %      Basophil % 0.3 %      Immature Grans % 0.5 %      Neutrophils, Absolute 11.24 10*3/mm3      Lymphocytes, Absolute 2.65 10*3/mm3      Monocytes, Absolute 1.19 10*3/mm3      Eosinophils, Absolute 0.02 10*3/mm3      Basophils, Absolute 0.05 10*3/mm3      Immature Grans, Absolute 0.07 10*3/mm3      nRBC 0.0 /100 WBC            Assessment/Plan       Pulmonary nodules/lesions, multiple    Lung nodules       Assessment & Plan     Satisfactory postoperative course.  Pain is well controlled.  No shortness of breath.  Patient did not want to go home today therefore I gave a trial of clamping the chest tube.  Will check chest x-ray in the morning.  Hope to remove chest tube and discharge patient home tomorrow.    Gio Burnett III, MD  [facility]  08/22/20  12:24

## 2020-08-22 NOTE — PLAN OF CARE
Problem: Patient Care Overview  Goal: Plan of Care Review  Outcome: Ongoing (interventions implemented as appropriate)  Flowsheets (Taken 8/22/2020 1600)  Progress: improving  Plan of Care Reviewed With: patient  Outcome Summary: VSS, pain controlled with PRN pain medicines, chest tube clamped per MD, ambulated in pascual, encouraged good pulmonary hygeine, will continue to monitor pt.     Problem: Chest Tube Drainage Device (Adult)  Goal: Signs and Symptoms of Listed Potential Problems Will be Absent, Minimized or Managed (Chest Tube Drainage Device)  Outcome: Ongoing (interventions implemented as appropriate)  Flowsheets (Taken 8/22/2020 1600)  Problems Assessed (Chest Tube Drainage Device): all  Problems Present (Chest Tube Drain Dev): pain     Problem: Pain, Acute (Adult)  Goal: Identify Related Risk Factors and Signs and Symptoms  Outcome: Ongoing (interventions implemented as appropriate)  Flowsheets (Taken 8/22/2020 1600)  Related Risk Factors (Acute Pain): surgery  Signs and Symptoms (Acute Pain): verbalization of pain descriptors     Problem: Pain, Acute (Adult)  Goal: Acceptable Pain Control/Comfort Level  Outcome: Ongoing (interventions implemented as appropriate)  Flowsheets (Taken 8/22/2020 1600)  Acceptable Pain Control/Comfort Level: making progress toward outcome

## 2020-08-22 NOTE — PLAN OF CARE
VSS, one time PRN pain medication needed after getting up to BSC, no nausea, voiding well, IVF and ABX continue. CT to water seal. Will continue to monitor.

## 2020-08-23 ENCOUNTER — APPOINTMENT (OUTPATIENT)
Dept: GENERAL RADIOLOGY | Facility: HOSPITAL | Age: 45
End: 2020-08-23

## 2020-08-23 PROCEDURE — 71045 X-RAY EXAM CHEST 1 VIEW: CPT

## 2020-08-23 PROCEDURE — 94799 UNLISTED PULMONARY SVC/PX: CPT

## 2020-08-23 PROCEDURE — 99024 POSTOP FOLLOW-UP VISIT: CPT | Performed by: THORACIC SURGERY (CARDIOTHORACIC VASCULAR SURGERY)

## 2020-08-23 PROCEDURE — 25010000002 ENOXAPARIN PER 10 MG: Performed by: THORACIC SURGERY (CARDIOTHORACIC VASCULAR SURGERY)

## 2020-08-23 PROCEDURE — 25010000002 HYDROMORPHONE PER 4 MG: Performed by: THORACIC SURGERY (CARDIOTHORACIC VASCULAR SURGERY)

## 2020-08-23 RX ADMIN — BUSPIRONE HYDROCHLORIDE 15 MG: 15 TABLET ORAL at 21:13

## 2020-08-23 RX ADMIN — GABAPENTIN 300 MG: 300 CAPSULE ORAL at 13:51

## 2020-08-23 RX ADMIN — OXYCODONE 5 MG: 5 TABLET ORAL at 08:16

## 2020-08-23 RX ADMIN — ACETAMINOPHEN 1000 MG: 500 TABLET, FILM COATED ORAL at 16:17

## 2020-08-23 RX ADMIN — POLYETHYLENE GLYCOL 3350 17 G: 17 POWDER, FOR SOLUTION ORAL at 08:16

## 2020-08-23 RX ADMIN — CELECOXIB 100 MG: 100 CAPSULE ORAL at 08:15

## 2020-08-23 RX ADMIN — MULTIPLE VITAMINS W/ MINERALS TAB 1 TABLET: TAB at 08:15

## 2020-08-23 RX ADMIN — HYDROMORPHONE HYDROCHLORIDE 0.5 MG: 1 INJECTION, SOLUTION INTRAMUSCULAR; INTRAVENOUS; SUBCUTANEOUS at 05:20

## 2020-08-23 RX ADMIN — GABAPENTIN 300 MG: 300 CAPSULE ORAL at 21:13

## 2020-08-23 RX ADMIN — GABAPENTIN 300 MG: 300 CAPSULE ORAL at 05:50

## 2020-08-23 RX ADMIN — LOSARTAN POTASSIUM 100 MG: 100 TABLET, FILM COATED ORAL at 08:15

## 2020-08-23 RX ADMIN — ENOXAPARIN SODIUM 40 MG: 40 INJECTION SUBCUTANEOUS at 08:15

## 2020-08-23 RX ADMIN — BUSPIRONE HYDROCHLORIDE 15 MG: 15 TABLET ORAL at 16:17

## 2020-08-23 RX ADMIN — DULOXETINE HYDROCHLORIDE 60 MG: 60 CAPSULE, DELAYED RELEASE ORAL at 08:15

## 2020-08-23 RX ADMIN — BISACODYL 10 MG: 10 SUPPOSITORY RECTAL at 12:25

## 2020-08-23 RX ADMIN — ACETAMINOPHEN 1000 MG: 500 TABLET, FILM COATED ORAL at 08:15

## 2020-08-23 RX ADMIN — METOPROLOL TARTRATE 25 MG: 25 TABLET, FILM COATED ORAL at 08:16

## 2020-08-23 RX ADMIN — ACETAMINOPHEN 1000 MG: 500 TABLET, FILM COATED ORAL at 21:13

## 2020-08-23 RX ADMIN — CELECOXIB 100 MG: 100 CAPSULE ORAL at 21:13

## 2020-08-23 RX ADMIN — DOCUSATE SODIUM 100 MG: 100 CAPSULE, LIQUID FILLED ORAL at 08:15

## 2020-08-23 RX ADMIN — BUSPIRONE HYDROCHLORIDE 15 MG: 15 TABLET ORAL at 08:15

## 2020-08-23 RX ADMIN — DOCUSATE SODIUM 50MG AND SENNOSIDES 8.6MG 2 TABLET: 8.6; 5 TABLET, FILM COATED ORAL at 21:13

## 2020-08-23 NOTE — PLAN OF CARE
Problem: Patient Care Overview  Goal: Plan of Care Review  Outcome: Ongoing (interventions implemented as appropriate)  Flowsheets (Taken 8/23/2020 3231)  Progress: improving  Plan of Care Reviewed With: patient  Outcome Summary: VSS, pain controlled w/PRN pain medications, chest tube unclamped on day shift and returned to water seal, moderate amount of output on noc shift, pt req 2L O2 per NC, no BM post-op, pt concerned for constipation, ambulation encouraged, IS use encouraged, will continue to monitor

## 2020-08-23 NOTE — PLAN OF CARE
Problem: Patient Care Overview  Goal: Plan of Care Review  Outcome: Ongoing (interventions implemented as appropriate)  Flowsheets (Taken 8/23/2020 1851)  Progress: improving  Plan of Care Reviewed With: patient  Outcome Summary: VSS, pain is controlled with PRN pain medicines, chest tube remmoved by MD, encouraged good pulmonary hygeine, will continue to monitor pt.     Problem: Chest Tube Drainage Device (Adult)  Goal: Signs and Symptoms of Listed Potential Problems Will be Absent, Minimized or Managed (Chest Tube Drainage Device)  Outcome: Ongoing (interventions implemented as appropriate)  Flowsheets (Taken 8/23/2020 1851)  Problems Assessed (Chest Tube Drainage Device): all  Problems Present (Chest Tube Drain Dev): pain     Problem: Pain, Acute (Adult)  Goal: Identify Related Risk Factors and Signs and Symptoms  Outcome: Ongoing (interventions implemented as appropriate)  Flowsheets (Taken 8/23/2020 1851)  Related Risk Factors (Acute Pain): surgery  Signs and Symptoms (Acute Pain): verbalization of pain descriptors     Problem: Pain, Acute (Adult)  Goal: Acceptable Pain Control/Comfort Level  Outcome: Ongoing (interventions implemented as appropriate)  Flowsheets (Taken 8/23/2020 1851)  Acceptable Pain Control/Comfort Level: making progress toward outcome

## 2020-08-23 NOTE — PROGRESS NOTES
"  Chief Complaint: Multiple pulmonary nodules, postoperative management  S/P:   ·  LEFT VIDEO ASSISTED THORACOSCOPY WITH DAVINCI ROBOT  ·  LEFT UPPER LOBE WEDGE RESECTION  ·  LEFT LOWER LOBE WEDGE RESECTION  ·  RESECTION OF MEDIASTINAL CYST  ·   INTERCOSTAL NERVE BLOCKS  POD # 2      Subjective:  Symptoms:  Stable.  She reports shortness of breath and chest pain.    Diet:  Adequate intake.  No nausea or vomiting.    Activity level: Returning to normal.    Pain:  She complains of pain that is mild.  Pain is well controlled.        Vital Signs:  Temp:  [96.6 °F (35.9 °C)-98.1 °F (36.7 °C)] 97.1 °F (36.2 °C)  Heart Rate:  [54-92] 92  Resp:  [16-18] 18  BP: (116-143)/(72-95) 123/78    Intake & Output (last day)       08/22 0701 - 08/23 0700 08/23 0701 - 08/24 0700    P.O. 960 240    I.V. (mL/kg)      Total Intake(mL/kg) 960 (15.5) 240 (3.9)    Urine (mL/kg/hr) 0 (0)     Blood      Chest Tube 130     Total Output 130     Net +830 +240          Urine Unmeasured Occurrence 2 x 1 x          Objective:  General Appearance:  Uncomfortable, well-appearing and in no acute distress.    Vital signs: (most recent): Blood pressure 123/78, pulse 92, temperature 97.1 °F (36.2 °C), temperature source Temporal, resp. rate 18, height 156.2 cm (61.5\"), weight 62.1 kg (137 lb), SpO2 95 %, not currently breastfeeding.  No fever.    Output: Producing urine and minimal stool output.    Lungs:  Normal effort and normal respiratory rate.  There are decreased breath sounds.    Heart: Normal rate.  Regular rhythm.  No murmur.   Abdomen: Abdomen is soft.  Bowel sounds are normal.   There is no abdominal tenderness.   There is no mass.   Extremities: There is no dependent edema.    Neurological: Patient is alert and oriented to person, place and time.  Normal strength.              Chest tube:   Site: Left, Clean, Dry, Intact and Securement device intact  Suction: -20 cm  Air Leak: negative  24 Hour Total: 130 ml    Results Review:     I reviewed " the patient's new clinical results.  I reviewed the patient's new imaging results and agree with the interpretation.    Imaging Results (Last 24 Hours)     Procedure Component Value Units Date/Time    XR Chest 1 View [[id number]] Collected:  08/23/20 0655     Updated:  08/23/20 0701    Narrative:       ONE VIEW PORTABLE CHEST AT 5:13 AM     HISTORY: Recent left chest surgery. Chest tube.     FINDINGS: A left-sided chest tube remains in place and there appears to  be a small left apical pneumothorax measuring 12 cm in height that is  new since yesterday's exam. There is also some slight worsening of  scattered atelectasis in the left lung and at the right base. The heart  size remains normal.     This report was finalized on 8/23/2020 6:58 AM by Dr. Quinn Myers M.D.             Lab Results:     Lab Results (last 24 hours)     Procedure Component Value Units Date/Time    Tissue / Bone Culture - Tissue, Lymph Node [[id number]] Collected:  08/21/20 0837    Specimen:  Tissue from Lymph Node Updated:  08/23/20 0904     Tissue Culture No growth at 2 days     Gram Stain No WBCs or organisms seen           Assessment/Plan       Pulmonary nodules/lesions, multiple    Lung nodules       Assessment & Plan     Chest tube had been clamped overnight but was unclamped due to increasing chest wall pain.  Minimal pneumothorax this morning.  No air leak.  Chest tube was removed completely and intact.  Patient is having problems with constipation and we have ordered a Dulcolax suppository.  We will continue to increase activity and encourage good pulmonary hygiene.  Check chest x-ray and labs in the morning.  If stable will discharge patient home tomorrow.    Gio Burnett III, MD  [facility]  08/23/20  13:32

## 2020-08-24 ENCOUNTER — APPOINTMENT (OUTPATIENT)
Dept: GENERAL RADIOLOGY | Facility: HOSPITAL | Age: 45
End: 2020-08-24

## 2020-08-24 ENCOUNTER — READMISSION MANAGEMENT (OUTPATIENT)
Dept: CALL CENTER | Facility: HOSPITAL | Age: 45
End: 2020-08-24

## 2020-08-24 VITALS
OXYGEN SATURATION: 97 % | TEMPERATURE: 98.7 F | DIASTOLIC BLOOD PRESSURE: 85 MMHG | WEIGHT: 137 LBS | RESPIRATION RATE: 16 BRPM | SYSTOLIC BLOOD PRESSURE: 131 MMHG | HEART RATE: 84 BPM | BODY MASS INDEX: 25.86 KG/M2 | HEIGHT: 61 IN

## 2020-08-24 LAB
ANION GAP SERPL CALCULATED.3IONS-SCNC: 9 MMOL/L (ref 5–15)
BACTERIA SPEC AEROBE CULT: NORMAL
BASOPHILS # BLD AUTO: 0.05 10*3/MM3 (ref 0–0.2)
BASOPHILS NFR BLD AUTO: 0.5 % (ref 0–1.5)
BUN SERPL-MCNC: 7 MG/DL (ref 6–20)
BUN/CREAT SERPL: 14.3 (ref 7–25)
CALCIUM SPEC-SCNC: 8.7 MG/DL (ref 8.6–10.5)
CHLORIDE SERPL-SCNC: 103 MMOL/L (ref 98–107)
CO2 SERPL-SCNC: 26 MMOL/L (ref 22–29)
CREAT SERPL-MCNC: 0.49 MG/DL (ref 0.57–1)
CYTO UR: NORMAL
DEPRECATED RDW RBC AUTO: 40.4 FL (ref 37–54)
EOSINOPHIL # BLD AUTO: 0.44 10*3/MM3 (ref 0–0.4)
EOSINOPHIL NFR BLD AUTO: 4.2 % (ref 0.3–6.2)
ERYTHROCYTE [DISTWIDTH] IN BLOOD BY AUTOMATED COUNT: 12 % (ref 12.3–15.4)
GFR SERPL CREATININE-BSD FRML MDRD: 138 ML/MIN/1.73
GLUCOSE SERPL-MCNC: 90 MG/DL (ref 65–99)
GRAM STN SPEC: NORMAL
HCT VFR BLD AUTO: 34.6 % (ref 34–46.6)
HGB BLD-MCNC: 11.5 G/DL (ref 12–15.9)
IMM GRANULOCYTES # BLD AUTO: 0.02 10*3/MM3 (ref 0–0.05)
IMM GRANULOCYTES NFR BLD AUTO: 0.2 % (ref 0–0.5)
LAB AP CASE REPORT: NORMAL
LYMPHOCYTES # BLD AUTO: 3.05 10*3/MM3 (ref 0.7–3.1)
LYMPHOCYTES NFR BLD AUTO: 29.2 % (ref 19.6–45.3)
Lab: NORMAL
MCH RBC QN AUTO: 30.3 PG (ref 26.6–33)
MCHC RBC AUTO-ENTMCNC: 33.2 G/DL (ref 31.5–35.7)
MCV RBC AUTO: 91.3 FL (ref 79–97)
MONOCYTES # BLD AUTO: 0.88 10*3/MM3 (ref 0.1–0.9)
MONOCYTES NFR BLD AUTO: 8.4 % (ref 5–12)
NEUTROPHILS NFR BLD AUTO: 57.5 % (ref 42.7–76)
NEUTROPHILS NFR BLD AUTO: 6.02 10*3/MM3 (ref 1.7–7)
NRBC BLD AUTO-RTO: 0 /100 WBC (ref 0–0.2)
PATH REPORT.FINAL DX SPEC: NORMAL
PATH REPORT.GROSS SPEC: NORMAL
PLATELET # BLD AUTO: 297 10*3/MM3 (ref 140–450)
PMV BLD AUTO: 8.8 FL (ref 6–12)
POTASSIUM SERPL-SCNC: 4.4 MMOL/L (ref 3.5–5.2)
RBC # BLD AUTO: 3.79 10*6/MM3 (ref 3.77–5.28)
SODIUM SERPL-SCNC: 138 MMOL/L (ref 136–145)
WBC # BLD AUTO: 10.46 10*3/MM3 (ref 3.4–10.8)

## 2020-08-24 PROCEDURE — 25010000002 ENOXAPARIN PER 10 MG: Performed by: THORACIC SURGERY (CARDIOTHORACIC VASCULAR SURGERY)

## 2020-08-24 PROCEDURE — 71045 X-RAY EXAM CHEST 1 VIEW: CPT

## 2020-08-24 PROCEDURE — 85025 COMPLETE CBC W/AUTO DIFF WBC: CPT | Performed by: THORACIC SURGERY (CARDIOTHORACIC VASCULAR SURGERY)

## 2020-08-24 PROCEDURE — 80048 BASIC METABOLIC PNL TOTAL CA: CPT | Performed by: THORACIC SURGERY (CARDIOTHORACIC VASCULAR SURGERY)

## 2020-08-24 PROCEDURE — 94799 UNLISTED PULMONARY SVC/PX: CPT

## 2020-08-24 PROCEDURE — 99024 POSTOP FOLLOW-UP VISIT: CPT | Performed by: NURSE PRACTITIONER

## 2020-08-24 RX ORDER — OXYCODONE HYDROCHLORIDE 5 MG/1
5 TABLET ORAL EVERY 4 HOURS PRN
Qty: 45 TABLET | Refills: 0 | Status: SHIPPED | OUTPATIENT
Start: 2020-08-24 | End: 2020-09-02

## 2020-08-24 RX ORDER — MAGNESIUM CARB/ALUMINUM HYDROX 105-160MG
296 TABLET,CHEWABLE ORAL ONCE
Status: COMPLETED | OUTPATIENT
Start: 2020-08-24 | End: 2020-08-24

## 2020-08-24 RX ORDER — CELECOXIB 100 MG/1
100 CAPSULE ORAL 2 TIMES DAILY
Qty: 60 CAPSULE | Refills: 0 | Status: SHIPPED | OUTPATIENT
Start: 2020-08-24 | End: 2020-09-23

## 2020-08-24 RX ORDER — ACETAMINOPHEN 500 MG
1000 TABLET ORAL 3 TIMES DAILY
Qty: 72 TABLET | Refills: 0 | Status: SHIPPED | OUTPATIENT
Start: 2020-08-24 | End: 2020-09-05

## 2020-08-24 RX ORDER — GABAPENTIN 300 MG/1
300 CAPSULE ORAL EVERY 8 HOURS SCHEDULED
Qty: 90 CAPSULE | Refills: 0 | Status: SHIPPED | OUTPATIENT
Start: 2020-08-24 | End: 2020-10-26

## 2020-08-24 RX ADMIN — POLYETHYLENE GLYCOL 3350 17 G: 17 POWDER, FOR SOLUTION ORAL at 09:35

## 2020-08-24 RX ADMIN — DOCUSATE SODIUM 100 MG: 100 CAPSULE, LIQUID FILLED ORAL at 09:36

## 2020-08-24 RX ADMIN — DULOXETINE HYDROCHLORIDE 60 MG: 60 CAPSULE, DELAYED RELEASE ORAL at 09:35

## 2020-08-24 RX ADMIN — ACETAMINOPHEN 1000 MG: 500 TABLET, FILM COATED ORAL at 09:36

## 2020-08-24 RX ADMIN — MULTIPLE VITAMINS W/ MINERALS TAB 1 TABLET: TAB at 09:36

## 2020-08-24 RX ADMIN — CELECOXIB 100 MG: 100 CAPSULE ORAL at 09:36

## 2020-08-24 RX ADMIN — GABAPENTIN 300 MG: 300 CAPSULE ORAL at 06:22

## 2020-08-24 RX ADMIN — Medication 296 ML: at 12:02

## 2020-08-24 RX ADMIN — BUSPIRONE HYDROCHLORIDE 15 MG: 15 TABLET ORAL at 09:36

## 2020-08-24 RX ADMIN — METOPROLOL TARTRATE 25 MG: 25 TABLET, FILM COATED ORAL at 09:36

## 2020-08-24 RX ADMIN — ENOXAPARIN SODIUM 40 MG: 40 INJECTION SUBCUTANEOUS at 09:35

## 2020-08-24 RX ADMIN — LOSARTAN POTASSIUM 100 MG: 100 TABLET, FILM COATED ORAL at 09:36

## 2020-08-24 NOTE — PLAN OF CARE
Problem: Patient Care Overview  Goal: Plan of Care Review  Outcome: Ongoing (interventions implemented as appropriate)  Flowsheets (Taken 8/24/2020 0500)  Progress: improving  Plan of Care Reviewed With: patient  Outcome Summary: VSS, ct removed on day shift, no c/o pain, up ad herlinda, plan for am cxray and d/c. continue to monitor

## 2020-08-24 NOTE — DISCHARGE SUMMARY
Patient Care Team:  Jack Velázquez MD as PCP - General (Family Medicine)  Annette Parekh MD as Consulting Physician (Obstetrics and Gynecology)  Jack Jorgensen MD as Consulting Physician (Allergy and Immunology)    Date of Admission: 8/21/2020   Date of Discharge:  8/24/2020    Discharge Diagnosis: Pulmonary nodules, multiple    Presenting Problem  Pulmonary nodules/lesions, multiple [R91.8]  Lung nodules [R91.8]     History of Present Illness  Roxy Calvert is a 44 y.o. female who presented to our lung care center on 7/30/2020 at [facility] to see Dr. Gio Burnett in consultation at the request of Dr. Tonio Talley with [facility].  Patient is a 44-year-old  female who is a non-smoker.  She did have significant secondhand smoke exposure from her mother who smoked even as the patient was a child.  Unfortunately, her mother passed away from adenocarcinoma of the lung.  Patient also has a strong family history of breast cancer.  She underwent genetic counseling was found to have no markers for breast cancer but was positive for EGFR.  It was recommended that she have a CT scan of her chest.  Scan showed multiple groundglass opacities throughout both lungs as well as mediastinal cyst.  She was then referred to our service for further evaluation.  Patient is active walking 4 to 5 miles per day but denies significant shortness of breath or wheezing.  She has no cough or hemoptysis.  There is no pleuritic pain, hoarseness or change in her voice.  She has had no unexplained weight loss.  Dr. Burnett reviewed the patient's imaging.  Differential diagnosis is quite extensive.  He is most concerned that these could represent an adenocarcinoma given her family history as well as her exposure smoker at an early age and the fact that she is EGFR positive.  Dr. Burnett recommended a left robot-assisted wedge resection of the lung nodules for tissue diagnosis.  At the same time  he could assess the paraspinal cyst and do biopsy if indicated.  Explained this all to the patient and her  in detail.  He discussed the risks and benefits of the procedure and answered all of their questions to their satisfaction.  The patient requested that he proceed with surgery.    Hospital Course  Roxy Calvert was admitted to [facility] on 8/21/2020 for a scheduled bronchoscopy with left video-assisted thoracoscopy with da Dinorah robot assisted left upper lobe wedge resection, left lower lobe wedge resection and resection of mediastinal cyst with intercostal nerve blocks.  The patient tolerated the procedure well, was extubated in the operating room after initial recovery in the postanesthesia care he was transferred to [facility] for convalescence.  Frozen specimen returned nonmalignant.  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  Ms. Calvert has had a satisfactory postoperative course.  Her chest tube was removed without difficulty on postoperative day 2.  She is hemodynamically stable, ambulatory without assistance and her pain is well managed.  She is ready to discharge home today.  Prescriptions have been sent to her pharmacy.  We will have her follow-up in our outpatient clinic in 2 weeks with a hospital performed chest x-ray.  Instructions have been given to the patient.  All of her questions have been answered to her satisfaction and she is agreeable to discharging home.    Procedures Performed  Procedure(s):  BRONCHOSCOPY, LEFT VIDEO ASSISTED THORACOSCOPY WITH DAVINCI ROBOT ASSISTED LEFT UPPER LOBE WEDGE RESECTION, LEFT LOWER LOBE WEDGE RESECTION, RESECTION OF MEDIASTINAL CYST,  INTERCOSTAL NERVE BLOCKS       Consults:   Consults     No orders found from 7/23/2020 to 8/22/2020.          Pertinent Test Results:     Imaging Results (Last 24 Hours)     Procedure Component Value Units Date/Time    XR Chest 1 View [[id number]]  Collected:  08/24/20 0651     Updated:  08/24/20 0659    Narrative:       XR CHEST 1 VW-     Clinical: Chest tube management     COMPARISON 8/23/2020     FINDINGS: The left-sided chest tube has been removed in the interim. No  pneumothorax. Cardiomediastinal silhouette is stable. No vascular  congestion seen. Diminished atelectasis at the left base. Diminished  atelectasis right base.     At the site of left upper lobe wedge resection there linear opacities of  atelectasis. This remains stable. The remainder is unremarkable.     This report was finalized on 8/24/2020 6:55 AM by Dr. Daniele Tadeo M.D.             Lab Results (last 24 hours)     Procedure Component Value Units Date/Time    Tissue Pathology Exam [[id number]] Collected:  08/21/20 0827    Specimen:  Tissue from Lymph Node; Tissue from Lung, Left Upper Lobe; Tissue from Mediastinum; Tissue from Lung, Left Lower Lobe Updated:  08/24/20 0903     Case Report --     Surgical Pathology Report                         Case: [id number]                                  Authorizing Provider:  Gio Burnett III, MD  Collected:           08/21/2020 08:27 AM          Ordering Location:     [facility]  Received:            08/21/2020 08:35 AM                                 MAIN OR                                                                      Pathologist:           Gio Mae MD                                                         Specimens:   1) - Lymph Node, L9 LYMPH NODE, OR 8 PHONE #[phone]                                                    2) - Lung, Left Upper Lobe, LEFT UPPER LOBE WEDGE RESECTION, OR 8 PHONE#[phone]                        3) - Lung, Left Lower Lobe, LEFT LOWER LOBE WEDGE, OR 8 PHONE# [phone]                                 4) - Mediastinum, MEDIASTINAL CYST                                                          Final Diagnosis --     1. Lymph Node, L9, Biopsy:    A. Single benign lymph node with anthracosis.    2.  "Lung, Wedge, Left Upper Lobe:    A. Benign lung with atypical pneumocyte hyperplasia (atypical adenomatous hyperplasia measuring up to 4 mm       best seen on the original frozen section).    3. Lung, Left Lower Lobe, Wedge:    A. Benign lung with atypical pneumocyte hyperplasia (atypical adenomatous hyperplasia measuring 3 mm on the       Slide and best seen on the original frozen section).    4. Soft Tissue, Mediastinum:    A. Benign cyst with ciliated epithelium.    magaly/elisha        Intraoperative Consultation --     #1 FS DX:  One benign lymph node.  Reported to  Dr. Burnett at 8:48 a.m. per Dr. Mae.     #2 FS DX:  Single focus of atypical pneumocyte hyperplasia.  Reported to  Dr. Burnett at 9:06 a.m. per Dr. Mae.     #3 FS DX:  Atypical pneumocyte hyperplasia.  Reported to  Dr. Burnett at 9:24 a.m. per Dr. Mae.   Ilan/o/magaly/kds         Gross Description --     1. Received fresh for frozen section diagnosis labeled \"L9 lymph node\" is an anthracotic 0.5 x 0.5 x 0.4 cm lymph node which is submitted en toto for frozen section diagnosis as 1AFS.      ILAN/MICHAEL/MAGALY/cale    2. Received fresh for frozen section diagnosis labeled \"left upper lobe wedge resection\" is a previously incised 4.0 x 1.8 x 1.5 cm lung wedge with a 4.0 cm single staple line margin which is inked orange.  The pleural surface is inked blue.  The pleura is smooth pale pink.  The specimen is previously incised to reveal erythematous parenchyma with no obvious lesions or masses.  The area of incision is submitted for frozen section diagnosis and the remainder of the specimen is submitted as follows:    2AFS - frozen section residue, area of concern incised by surgeon  2B-2C - remainder of specimen     3. Received fresh for frozen section diagnosis labeled \"left lower lobe wedge\" is a 2 gram, 4.0 x 1.5 x 1.2 cm previously incised lung wedge with a 4 cm staple line margin which is inked orange.  The pale pink pleural surface is inked blue. At the " "incision site the parenchyma is hemorrhagic with no obvious lesions or masses.  Representative sections of the area of concern incised by the surgeon is submitted for frozen section diagnosis and the remainder of the specimen is submitted as follows:    3AFS - frozen section residue - area of concern  3B-3C - remainder of specimen     Ilan/o/magaly/kds    4. Received in formalin labeled \"mediastinal cyst\" is a 2.2 x 1.2 x 1.0 cm tan red irregular rubbery membranous tissue which is serially sectioned and entirely submitted as 4A-4B.      ILAN/MICHAEL/MAGALY/jse        Microscopic Description --     Performed, incorporated in diagnosis.       Anaerobic Culture - Tissue, Lymph Node [[id number]] Collected:  08/21/20 0837    Specimen:  Tissue from Lymph Node Updated:  08/24/20 0901     Anaerobic Culture No anaerobes isolated at 3 days    Tissue / Bone Culture - Tissue, Lymph Node [[id number]] Collected:  08/21/20 0837    Specimen:  Tissue from Lymph Node Updated:  08/24/20 0644     Tissue Culture No growth at 3 days     Gram Stain No WBCs or organisms seen    Basic Metabolic Panel [[id number]]  (Abnormal) Collected:  08/24/20 0442    Specimen:  Blood Updated:  08/24/20 0537     Glucose 90 mg/dL      BUN 7 mg/dL      Creatinine 0.49 mg/dL      Sodium 138 mmol/L      Potassium 4.4 mmol/L      Chloride 103 mmol/L      CO2 26.0 mmol/L      Calcium 8.7 mg/dL      eGFR Non African Amer 138 mL/min/1.73      BUN/Creatinine Ratio 14.3     Anion Gap 9.0 mmol/L     Narrative:       GFR Normal >60  Chronic Kidney Disease <60  Kidney Failure <15      CBC & Differential [[id number]] Collected:  08/24/20 0442    Specimen:  Blood Updated:  08/24/20 0512    Narrative:       The following orders were created for panel order CBC & Differential.  Procedure                               Abnormality         Status                     ---------                               -----------         ------                     CBC Auto Differential[[id number]]      "   Abnormal            Final result                 Please view results for these tests on the individual orders.    CBC Auto Differential [[id number]]  (Abnormal) Collected:  08/24/20 0442    Specimen:  Blood Updated:  08/24/20 0512     WBC 10.46 10*3/mm3      RBC 3.79 10*6/mm3      Hemoglobin 11.5 g/dL      Hematocrit 34.6 %      MCV 91.3 fL      MCH 30.3 pg      MCHC 33.2 g/dL      RDW 12.0 %      RDW-SD 40.4 fl      MPV 8.8 fL      Platelets 297 10*3/mm3      Neutrophil % 57.5 %      Lymphocyte % 29.2 %      Monocyte % 8.4 %      Eosinophil % 4.2 %      Basophil % 0.5 %      Immature Grans % 0.2 %      Neutrophils, Absolute 6.02 10*3/mm3      Lymphocytes, Absolute 3.05 10*3/mm3      Monocytes, Absolute 0.88 10*3/mm3      Eosinophils, Absolute 0.44 10*3/mm3      Basophils, Absolute 0.05 10*3/mm3      Immature Grans, Absolute 0.02 10*3/mm3      nRBC 0.0 /100 WBC             Condition on Discharge:  good    Vital Signs  Temp:  [97.2 °F (36.2 °C)-98.7 °F (37.1 °C)] 98.7 °F (37.1 °C)  Heart Rate:  [66-87] 84  Resp:  [16-18] 16  BP: (102-132)/(61-89) 131/85    Physical Exam:    General Appearance:    Alert, cooperative, in no acute distress   Head:    Normocephalic, without obvious abnormality, atraumatic   Eyes:            Lids and lashes normal, conjunctivae and sclerae normal, no   icterus, no pallor, corneas clear, PERRLA   Ears:    Ears appear intact with no abnormalities noted   Throat:   No oral lesions, no thrush, oral mucosa moist   Neck:   No adenopathy, supple, trachea midline, no thyromegaly, no     carotid bruit, no JVD   Back:     No kyphosis present, no scoliosis present, no skin lesions,       erythema or scars, no tenderness to percussion or                   palpation,   range of motion normal   Lungs:     Clear to auscultation,respirations regular, even and                   unlabored    Heart:    Regular rhythm and normal rate, normal S1 and S2, no            murmur, no gallop, no rub, no click      Breast Exam:    Deferred   Abdomen:     Normal bowel sounds, no masses, no organomegaly, soft        non-tender, non-distended, no guarding, no rebound                 tenderness   Genitalia:    Deferred   Extremities:   Moves all extremities well, no edema, no cyanosis, no              redness   Pulses:   Pulses palpable and equal bilaterally   Skin:   No bleeding, bruising or rash.  Surgical incisions are clean, dry and intact with Dermabond.  Chest tube site covered with gauze and DuoDERM.  There is no drainage.   Lymph nodes:   No palpable adenopathy   Neurologic:   Cranial nerves 2 - 12 grossly intact, sensation intact, DTR        present and equal bilaterally       Discharge Disposition  Home today    Discharge Medications     Discharge Medications      New Medications      Instructions Start Date   acetaminophen 500 MG tablet  Commonly known as:  TYLENOL   1,000 mg, Oral, 3 Times Daily      celecoxib 100 MG capsule  Commonly known as:  CeleBREX  Notes to patient:  Take medication for 30 days    100 mg, Oral, 2 Times Daily      gabapentin 300 MG capsule  Commonly known as:  NEURONTIN  Notes to patient:  Take medication for 30 days    300 mg, Oral, Every 8 Hours Scheduled      oxyCODONE 5 MG immediate release tablet  Commonly known as:  Roxicodone   5 mg, Oral, Every 4 Hours PRN         Continue These Medications      Instructions Start Date   busPIRone 15 MG tablet  Commonly known as:  BUSPAR   15 mg, Oral, 3 Times Daily      CALCIUM CITRATE-VITAMIN D3 PO   1 tablet, Oral, Daily, 1200 MG HOLD FOR SURGERY       DULoxetine 60 MG capsule  Commonly known as:  CYMBALTA   60 mg, Oral, Daily      fish oil 1200 MG capsule capsule   1,200 mg, Oral, Daily, EPA AND 900MG DHA HOLD FOR SURGERY       losartan 100 MG tablet  Commonly known as:  COZAAR   100 mg, Oral, Daily      Magnesium 500 MG tablet   1 tablet, Oral, Daily, HOLD FOR SURGERY       MULTIVITAMIN ADULT PO   1 tablet, Oral, Daily, HOLD FOR SURGERY      senna  8.6 MG tablet  Commonly known as:  SENOKOT   1 tablet, Oral, As Needed      Turmeric 500 MG capsule   3 tablets, Oral, Daily, WITH BIOPERINE-TOTAL OF  1950MG HOLD FOR SURGERY              Discharge Instructions:  · No heavy lifting, pushing, pulling greater than 10 pounds.  · No driving up until 2 weeks after surgery and no longer taking narcotics.  · Resume home diet as tolerated.  · Continue incentive spirometer at least 4 times per day.  · Remove dressing from post chest tube site after 48 hours, may shower and clean surgical sites with antibacterial soap or hydrogen peroxide, and apply gauze dressing or band-aid as needed for any drainage.  No dressing needed once no longer draining.          Follow-up Appointments  Future Appointments   Date Time Provider Department Center   9/8/2020  9:15 AM Gio Burnett III, MD [facility] None   10/16/2020  5:15 PM [facility] 2 [facility]   2/8/2021  3:00 PM Annette Parekh MD [facility] None     Additional Instructions for the Follow-ups that You Need to Schedule     XR Chest 2 View    Sep 07, 2020 (Approximate)      Exam reason:  s/p VATS               Test Results Pending at Discharge   Order Current Status    Fungus Culture - Tissue, Lymph Node In process    Anaerobic Culture - Tissue, Lymph Node Preliminary result          For any questions regarding patient's stay, please refer to patient's chart.    MAGDA Soto  [facility]  08/24/20  14:16    Patient was seen and assessed while wearing personal protective equipment including facemask, protective eyewear and gloves.  And hygiene performed prior to entering the room and after exiting upon discharge.    Greater than 30 minutes was spent on the unit discharging this patient with more than 50% time spent assessing the patient, counseling patient on postoperative care and discharge planning.

## 2020-08-24 NOTE — PROGRESS NOTES
Discharge Planning Assessment  [facility]     Patient Name: Roxy Calvert  MRN: [id number]  Today's Date: 8/24/2020    Admit Date: 8/21/2020    Discharge Needs Assessment     Row Name 08/24/20 1149       Living Environment    Lives With  spouse    Name(s) of Who Lives With Patient  , Jack Calvert, [phone]; two children    Current Living Arrangements  home/apartment/condo    Primary Care Provided by  self    Provides Primary Care For  child(tracy)    Family Caregiver if Needed  spouse    Quality of Family Relationships  helpful;involved;supportive    Able to Return to Prior Arrangements  yes       Resource/Environmental Concerns    Resource/Environmental Concerns  none       Transition Planning    Patient/Family Anticipates Transition to  home with family    Patient/Family Anticipated Services at Transition  none    Transportation Anticipated  family or friend will provide       Discharge Needs Assessment    Concerns to be Addressed  no discharge needs identified;denies needs/concerns at this time    Equipment Currently Used at Home  none    Discharge Coordination/Progress  Home        Discharge Plan     Row Name 08/24/20 1148       Plan    Plan  Home with family    Patient/Family in Agreement with Plan  yes    Plan Comments  CCP met with pt to verify information and discuss d/c planning. Pt resides with her  and two children in a home with no accessibility concerns, and has no h/o home health or sub-acute rehab. Pt uses [facility] [place]. Pt denies known d/c needs at this time. CCP to follow. Breonna Levine LCSW        Destination      Coordination has not been started for this encounter.      Durable Medical Equipment      Coordination has not been started for this encounter.      Dialysis/Infusion      Coordination has not been started for this encounter.      Home Medical Care      Coordination has not been started for this encounter.      Therapy      Coordination has  not been started for this encounter.      Community Resources      Coordination has not been started for this encounter.          Demographic Summary     Row Name 08/24/20 1149       General Information    Admission Type  inpatient    Arrived From  home    Referral Source  admission list    Reason for Consult  discharge planning    Preferred Language  English        Functional Status     Row Name 08/24/20 1149       Functional Status    Usual Activity Tolerance  excellent    Current Activity Tolerance  excellent       Functional Status, IADL    Medications  independent    Meal Preparation  independent    Housekeeping  independent    Laundry  independent    Shopping  independent       Mental Status Summary    Recent Changes in Mental Status/Cognitive Functioning  no changes        Psychosocial    No documentation.       Abuse/Neglect    No documentation.       Legal    No documentation.       Substance Abuse    No documentation.       Patient Forms    No documentation.           Deyanria Levine LCSW

## 2020-08-24 NOTE — DISCHARGE INSTRUCTIONS
Discharge Instructions:    1. Activity:  • No heavy lifting, pushing, pulling greater than 10 pounds  • May drive car after 2 weeks or as directed by Physician  • Walk at least 3 times a day  • Climb stairs as tolerated  • Continue incentive spirometer at least 4 times per day    2. Nutrition:  • Resume previous home diet as tolerated  • Eat well balanced meals  • Avoid constipation by eating fresh fruits, vegetables, whole grain foods and increasing fluid intake    3. Incision (wound) Care:  • Remove dressing after 48 hours  • If continued drainage, apply gauze dressing every 24 hours and as needed for any drainage  • May shower on day of discharge as long as dressings are kept as dry as possible  • Wash around incision area with soap and water daily.  • No lotions or creams on incision area  • Take temperature daily for the first week after discharge    4. When to call for Medical Advice:  • Fever greater than 101 degrees  • Unusual or severe pain  • Difficulty breathing  • Incision changes (redness, swelling or increased drainage)  • Any questions or problems    5. Medication Instructions:  • Take Pain medications as directed to stay comfortable  • No driving or drinking alcohol when taking prescribed pain meds  • Laxative of choice if needed for constipation    6. Medication and dosages:  · Continue home medications as directed unless instructed otherwise  · Take prescribed pain medication as directed

## 2020-08-25 ENCOUNTER — TRANSITIONAL CARE MANAGEMENT TELEPHONE ENCOUNTER (OUTPATIENT)
Dept: CALL CENTER | Facility: HOSPITAL | Age: 45
End: 2020-08-25

## 2020-08-25 LAB
BH BB BLOOD EXPIRATION DATE: NORMAL
BH BB BLOOD EXPIRATION DATE: NORMAL
BH BB BLOOD TYPE BARCODE: 5100
BH BB BLOOD TYPE BARCODE: 5100
BH BB DISPENSE STATUS: NORMAL
BH BB DISPENSE STATUS: NORMAL
BH BB PRODUCT CODE: NORMAL
BH BB PRODUCT CODE: NORMAL
BH BB UNIT NUMBER: NORMAL
BH BB UNIT NUMBER: NORMAL
CROSSMATCH INTERPRETATION: NORMAL
CROSSMATCH INTERPRETATION: NORMAL
UNIT  ABO: NORMAL
UNIT  ABO: NORMAL
UNIT  RH: NORMAL
UNIT  RH: NORMAL

## 2020-08-25 NOTE — OUTREACH NOTE
Prep Survey      Responses   [facility] patient discharged from?  [place]   Is LACE score < 7 ?  Yes   Eligibility  [facility]   Date of Admission  08/21/20   Date of Discharge  08/24/20   Discharge Disposition  Home or Self Care   Discharge diagnosis  Pulmonary nodules, multiple,       LEFT VIDEO ASSISTED THORACOSCOPY WITH DAVINCI ROBOT ASSISTED LEFT UPPER LOBE WEDGE RESECTION, LEFT LOWER LOBE WEDGE RESECTION   COVID-19 Test Status  Negative   Does the patient have one of the following disease processes/diagnoses(primary or secondary)?  Cardiothoracic surgery   Does the patient have Home health ordered?  No   Is there a DME ordered?  No   Prep survey completed?  Yes          Susy Valadez RN

## 2020-08-25 NOTE — OUTREACH NOTE
Call Center TCM Note      Responses   [facility] [place] by [facility] patient discharged from?  [place]   Does the patient have one of the following disease processes/diagnoses(primary or secondary)?  Cardiothoracic surgery   TCM attempt successful?  Yes   Call start time  1253   Call end time  1256   Discharge diagnosis  Pulmonary nodules, multiple,       LEFT VIDEO ASSISTED THORACOSCOPY WITH DAVINCI ROBOT ASSISTED LEFT UPPER LOBE WEDGE RESECTION, LEFT LOWER LOBE WEDGE RESECTION   Meds reviewed with patient/caregiver?  Yes   Is the patient having any side effects they believe may be caused by any medication additions or changes?  No   Does the patient have all medications related to this admission filled (includes all antibiotics, pain medications, cardiac medications, etc.)  Yes   Is the patient taking all medications as directed (includes completed medication regime)?  Yes   Does the patient have a primary care provider?   Yes   Does the patient have an appointment scheduled with their C/T surgeon?  Yes [9/8/20]   Comments regarding PCP  8/27/20 at 1:30 pm    Has the patient kept scheduled appointments due by today?  N/A   Psychosocial issues?  No   Did the patient receive a copy of their discharge instructions?  Yes   Nursing interventions  Reviewed instructions with patient   What is the patient's perception of their health status since discharge?  Improving   Nursing interventions  Nurse provided patient education   Is the patient/caregiver able to teach back normal signs of recovery?  Nausea and lack of appetite   Nursing interventions  Reassured on normal signs of recovery   Is the patient /caregiver able to teach back basic post-op care?  Lifting as instructed by MD in discharge instructions, Continue use of incentive spirometry at least 1 week post discharge, Drive as instructed by MD in discharge instructions   Is the patient/caregiver able to teach back signs and symptoms of incisional infection?  Fever   Is the  patient/caregiver able to teach back steps to recovery at home?  Rest and rebuild strength, gradually increase activity   If the patient is a current smoker, are they able to teach back resources for cessation?  -- [Nonsmoker]   Is the patient/caregiver able to teach back the hierarchy of who to call/visit for symptoms/problems? PCP, Specialist, Home health nurse, Urgent Care, ED, 911  Yes   TCM call completed?  Yes          Karly James RN    8/25/2020, 12:56

## 2020-08-25 NOTE — PROGRESS NOTES
Case Management Discharge Note      Final Note: Pt discharged home, no known needs.  VINOD muñoz RN         Destination      No service has been selected for the patient.      Durable Medical Equipment      No service has been selected for the patient.      Dialysis/Infusion      No service has been selected for the patient.      Home Medical Care      No service has been selected for the patient.      Therapy      No service has been selected for the patient.      Community Resources      No service has been selected for the patient.        Transportation Services  Private: Car    Final Discharge Disposition Code: 01 - home or self-care

## 2020-08-25 NOTE — PAYOR COMM NOTE
"Yomi Calvert (44 y.o. Female)     ATTN: DISCHARGE SUMMARY FOR REVIEW [id number]  [facility] DEPT: TEJAL PERKINS RN, [phone],  [phone]        Date of Birth Social Security Number Address Home Phone MRN    [id number]  [address] [facility] [address] [phone] [id number]    Episcopal Marital Status          Sikh        Admission Date Admission Type Admitting Provider Attending Provider Department, Room/Bed    8/21/20 Elective Linker, Gio MORALES III, MD  [address], [address]/1    Discharge Date Discharge Disposition Discharge Destination        8/24/2020 Home or Self Care              Attending Provider:  (none)   Allergies:  Latex    Isolation:  None   Infection:  None   Code Status:  Prior    Ht:  156.2 cm (61.5\")   Wt:  62.1 kg (137 lb)    Admission Cmt:  None   Principal Problem:  Pulmonary nodules/lesions, multiple [R91.8]                 Active Insurance as of 8/21/2020     Primary Coverage     Payor Plan Insurance Group Employer/Plan Group    [facility] [employer] [place] [id number]     Payor Plan Address Payor Plan Phone Number Payor Plan Fax Number Effective Dates    [address]   11/30/2014 - None Entered    [place] [address]       Subscriber Name Subscriber Birth Date Member ID       YOMI CALVERT 1975 [id number]                 Emergency Contacts      (Rel.) Home Phone Work Phone Mobile Phone    TazJack paulson (Spouse) -- -- [phone]               Discharge Summary      Maggie Lou APRN at 08/24/20 1403     Attestation signed by Sofie Garsia MD at 08/24/20 1638    I saw and examined this patient, Ms. Lou and  I discussed the patient and I agree with her assessment and plan.                         Patient Care Team:  Jack Velázquez MD as PCP - General (Family Medicine)  Annette Parekh MD as Consulting Physician (Obstetrics and Gynecology)  Jack Jorgensen MD as Consulting Physician (Allergy and " Immunology)    Date of Admission: 8/21/2020   Date of Discharge:  8/24/2020    Discharge Diagnosis: Pulmonary nodules, multiple    Presenting Problem  Pulmonary nodules/lesions, multiple [R91.8]  Lung nodules [R91.8]     History of Present Illness  Roxy Calvert is a 44 y.o. female who presented to our lung care center on 7/30/2020 at [facility] to see Dr. Gio Burnett in consultation at the request of Dr. Tonio Talley with [facility].  Patient is a 44-year-old  female who is a non-smoker.  She did have significant secondhand smoke exposure from her mother who smoked even as the patient was a child.  Unfortunately, her mother passed away from adenocarcinoma of the lung.  Patient also has a strong family history of breast cancer.  She underwent genetic counseling was found to have no markers for breast cancer but was positive for EGFR.  It was recommended that she have a CT scan of her chest.  Scan showed multiple groundglass opacities throughout both lungs as well as mediastinal cyst.  She was then referred to our service for further evaluation.  Patient is active walking 4 to 5 miles per day but denies significant shortness of breath or wheezing.  She has no cough or hemoptysis.  There is no pleuritic pain, hoarseness or change in her voice.  She has had no unexplained weight loss.  Dr. Burnett reviewed the patient's imaging.  Differential diagnosis is quite extensive.  He is most concerned that these could represent an adenocarcinoma given her family history as well as her exposure smoker at an early age and the fact that she is EGFR positive.  Dr. Burnett recommended a left robot-assisted wedge resection of the lung nodules for tissue diagnosis.  At the same time he could assess the paraspinal cyst and do biopsy if indicated.  Explained this all to the patient and her  in detail.  He discussed the risks and benefits of the procedure and answered all of their  questions to their satisfaction.  The patient requested that he proceed with surgery.    Hospital Course  Roxy Calvert was admitted to [facility] on 8/21/2020 for a scheduled bronchoscopy with left video-assisted thoracoscopy with da Dinorah robot assisted left upper lobe wedge resection, left lower lobe wedge resection and resection of mediastinal cyst with intercostal nerve blocks.  The patient tolerated the procedure well, was extubated in the operating room after initial recovery in the postanesthesia care he was transferred to [facility] for convalescence.  Frozen specimen returned nonmalignant.  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  Ms. Calvert has had a satisfactory postoperative course.  Her chest tube was removed without difficulty on postoperative day 2.  She is hemodynamically stable, ambulatory without assistance and her pain is well managed.  She is ready to discharge home today.  Prescriptions have been sent to her pharmacy.  We will have her follow-up in our outpatient clinic in 2 weeks with a hospital performed chest x-ray.  Instructions have been given to the patient.  All of her questions have been answered to her satisfaction and she is agreeable to discharging home.    Procedures Performed  Procedure(s):  BRONCHOSCOPY, LEFT VIDEO ASSISTED THORACOSCOPY WITH DAVINCI ROBOT ASSISTED LEFT UPPER LOBE WEDGE RESECTION, LEFT LOWER LOBE WEDGE RESECTION, RESECTION OF MEDIASTINAL CYST,  INTERCOSTAL NERVE BLOCKS       Consults:   Consults     No orders found from 7/23/2020 to 8/22/2020.          Pertinent Test Results:     Imaging Results (Last 24 Hours)     Procedure Component Value Units Date/Time    XR Chest 1 View [[id number]] Collected:  08/24/20 0651     Updated:  08/24/20 0659    Narrative:       XR CHEST 1 VW-     Clinical: Chest tube management     COMPARISON 8/23/2020     FINDINGS: The left-sided chest tube has been removed  in the interim. No  pneumothorax. Cardiomediastinal silhouette is stable. No vascular  congestion seen. Diminished atelectasis at the left base. Diminished  atelectasis right base.     At the site of left upper lobe wedge resection there linear opacities of  atelectasis. This remains stable. The remainder is unremarkable.     This report was finalized on 8/24/2020 6:55 AM by Dr. Daniele Tadeo M.D.             Lab Results (last 24 hours)     Procedure Component Value Units Date/Time    Tissue Pathology Exam [[id number]] Collected:  08/21/20 0827    Specimen:  Tissue from Lymph Node; Tissue from Lung, Left Upper Lobe; Tissue from Mediastinum; Tissue from Lung, Left Lower Lobe Updated:  08/24/20 0903     Case Report --     Surgical Pathology Report                         Case: [id number]                                  Authorizing Provider:  Gio Burnett III, MD  Collected:           08/21/2020 08:27 AM          Ordering Location:     [facility]  Received:            08/21/2020 08:35 AM                                 MAIN OR                                                                      Pathologist:           Gio Mae MD                                                         Specimens:   1) - Lymph Node, L9 LYMPH NODE, OR 8 PHONE #[phone]                                                    2) - Lung, Left Upper Lobe, LEFT UPPER LOBE WEDGE RESECTION, OR 8 PHONE#[phone]                        3) - Lung, Left Lower Lobe, LEFT LOWER LOBE WEDGE, OR 8 PHONE# [phone]                                 4) - Mediastinum, MEDIASTINAL CYST                                                          Final Diagnosis --     1. Lymph Node, L9, Biopsy:    A. Single benign lymph node with anthracosis.    2. Lung, Wedge, Left Upper Lobe:    A. Benign lung with atypical pneumocyte hyperplasia (atypical adenomatous hyperplasia measuring up to 4 mm       best seen on the original frozen section).    3. Lung, Left  "Lower Lobe, Wedge:    A. Benign lung with atypical pneumocyte hyperplasia (atypical adenomatous hyperplasia measuring 3 mm on the       Slide and best seen on the original frozen section).    4. Soft Tissue, Mediastinum:    A. Benign cyst with ciliated epithelium.    tyler/elisha        Intraoperative Consultation --     #1 FS DX:  One benign lymph node.  Reported to  Dr. Burnett at 8:48 a.m. per Dr. Mae.     #2 FS DX:  Single focus of atypical pneumocyte hyperplasia.  Reported to  Dr. Burnett at 9:06 a.m. per Dr. Mae.     #3 FS DX:  Atypical pneumocyte hyperplasia.  Reported to  Dr. Burnett at 9:24 a.m. per Dr. Mae.   Jap/uso/tyler/kds         Gross Description --     1. Received fresh for frozen section diagnosis labeled \"L9 lymph node\" is an anthracotic 0.5 x 0.5 x 0.4 cm lymph node which is submitted en toto for frozen section diagnosis as 1AFS.      DUNIA/USO/TYLER/elisha    2. Received fresh for frozen section diagnosis labeled \"left upper lobe wedge resection\" is a previously incised 4.0 x 1.8 x 1.5 cm lung wedge with a 4.0 cm single staple line margin which is inked orange.  The pleural surface is inked blue.  The pleura is smooth pale pink.  The specimen is previously incised to reveal erythematous parenchyma with no obvious lesions or masses.  The area of incision is submitted for frozen section diagnosis and the remainder of the specimen is submitted as follows:    2AFS - frozen section residue, area of concern incised by surgeon  2B-2C - remainder of specimen     3. Received fresh for frozen section diagnosis labeled \"left lower lobe wedge\" is a 2 gram, 4.0 x 1.5 x 1.2 cm previously incised lung wedge with a 4 cm staple line margin which is inked orange.  The pale pink pleural surface is inked blue. At the incision site the parenchyma is hemorrhagic with no obvious lesions or masses.  Representative sections of the area of concern incised by the surgeon is submitted for frozen section diagnosis and the remainder " "of the specimen is submitted as follows:    3AFS - frozen section residue - area of concern  3B-3C - remainder of specimen     Jap/uso/magaly/kds    4. Received in formalin labeled \"mediastinal cyst\" is a 2.2 x 1.2 x 1.0 cm tan red irregular rubbery membranous tissue which is serially sectioned and entirely submitted as 4A-4B.      DUNIA/USO/MAGALY/jse        Microscopic Description --     Performed, incorporated in diagnosis.       Anaerobic Culture - Tissue, Lymph Node [[id number]] Collected:  08/21/20 0837    Specimen:  Tissue from Lymph Node Updated:  08/24/20 0901     Anaerobic Culture No anaerobes isolated at 3 days    Tissue / Bone Culture - Tissue, Lymph Node [[id number]] Collected:  08/21/20 0837    Specimen:  Tissue from Lymph Node Updated:  08/24/20 0644     Tissue Culture No growth at 3 days     Gram Stain No WBCs or organisms seen    Basic Metabolic Panel [[id number]]  (Abnormal) Collected:  08/24/20 0442    Specimen:  Blood Updated:  08/24/20 0537     Glucose 90 mg/dL      BUN 7 mg/dL      Creatinine 0.49 mg/dL      Sodium 138 mmol/L      Potassium 4.4 mmol/L      Chloride 103 mmol/L      CO2 26.0 mmol/L      Calcium 8.7 mg/dL      eGFR Non African Amer 138 mL/min/1.73      BUN/Creatinine Ratio 14.3     Anion Gap 9.0 mmol/L     Narrative:       GFR Normal >60  Chronic Kidney Disease <60  Kidney Failure <15      CBC & Differential [[id number]] Collected:  08/24/20 0442    Specimen:  Blood Updated:  08/24/20 0512    Narrative:       The following orders were created for panel order CBC & Differential.  Procedure                               Abnormality         Status                     ---------                               -----------         ------                     CBC Auto Differential[[id number]]        Abnormal            Final result                 Please view results for these tests on the individual orders.    CBC Auto Differential [[id number]]  (Abnormal) Collected:  08/24/20 0442    Specimen:  Blood " Updated:  08/24/20 0512     WBC 10.46 10*3/mm3      RBC 3.79 10*6/mm3      Hemoglobin 11.5 g/dL      Hematocrit 34.6 %      MCV 91.3 fL      MCH 30.3 pg      MCHC 33.2 g/dL      RDW 12.0 %      RDW-SD 40.4 fl      MPV 8.8 fL      Platelets 297 10*3/mm3      Neutrophil % 57.5 %      Lymphocyte % 29.2 %      Monocyte % 8.4 %      Eosinophil % 4.2 %      Basophil % 0.5 %      Immature Grans % 0.2 %      Neutrophils, Absolute 6.02 10*3/mm3      Lymphocytes, Absolute 3.05 10*3/mm3      Monocytes, Absolute 0.88 10*3/mm3      Eosinophils, Absolute 0.44 10*3/mm3      Basophils, Absolute 0.05 10*3/mm3      Immature Grans, Absolute 0.02 10*3/mm3      nRBC 0.0 /100 WBC             Condition on Discharge:  good    Vital Signs  Temp:  [97.2 °F (36.2 °C)-98.7 °F (37.1 °C)] 98.7 °F (37.1 °C)  Heart Rate:  [66-87] 84  Resp:  [16-18] 16  BP: (102-132)/(61-89) 131/85    Physical Exam:    General Appearance:    Alert, cooperative, in no acute distress   Head:    Normocephalic, without obvious abnormality, atraumatic   Eyes:            Lids and lashes normal, conjunctivae and sclerae normal, no   icterus, no pallor, corneas clear, PERRLA   Ears:    Ears appear intact with no abnormalities noted   Throat:   No oral lesions, no thrush, oral mucosa moist   Neck:   No adenopathy, supple, trachea midline, no thyromegaly, no     carotid bruit, no JVD   Back:     No kyphosis present, no scoliosis present, no skin lesions,       erythema or scars, no tenderness to percussion or                   palpation,   range of motion normal   Lungs:     Clear to auscultation,respirations regular, even and                   unlabored    Heart:    Regular rhythm and normal rate, normal S1 and S2, no            murmur, no gallop, no rub, no click   Breast Exam:    Deferred   Abdomen:     Normal bowel sounds, no masses, no organomegaly, soft        non-tender, non-distended, no guarding, no rebound                 tenderness   Genitalia:    Deferred      Extremities:   Moves all extremities well, no edema, no cyanosis, no              redness   Pulses:   Pulses palpable and equal bilaterally   Skin:   No bleeding, bruising or rash.  Surgical incisions are clean, dry and intact with Dermabond.  Chest tube site covered with gauze and DuoDERM.  There is no drainage.   Lymph nodes:   No palpable adenopathy   Neurologic:   Cranial nerves 2 - 12 grossly intact, sensation intact, DTR        present and equal bilaterally       Discharge Disposition  Home today    Discharge Medications     Discharge Medications      New Medications      Instructions Start Date   acetaminophen 500 MG tablet  Commonly known as:  TYLENOL   1,000 mg, Oral, 3 Times Daily      celecoxib 100 MG capsule  Commonly known as:  CeleBREX  Notes to patient:  Take medication for 30 days    100 mg, Oral, 2 Times Daily      gabapentin 300 MG capsule  Commonly known as:  NEURONTIN  Notes to patient:  Take medication for 30 days    300 mg, Oral, Every 8 Hours Scheduled      oxyCODONE 5 MG immediate release tablet  Commonly known as:  Roxicodone   5 mg, Oral, Every 4 Hours PRN         Continue These Medications      Instructions Start Date   busPIRone 15 MG tablet  Commonly known as:  BUSPAR   15 mg, Oral, 3 Times Daily      CALCIUM CITRATE-VITAMIN D3 PO   1 tablet, Oral, Daily, 1200 MG HOLD FOR SURGERY       DULoxetine 60 MG capsule  Commonly known as:  CYMBALTA   60 mg, Oral, Daily      fish oil 1200 MG capsule capsule   1,200 mg, Oral, Daily, EPA AND 900MG DHA HOLD FOR SURGERY       losartan 100 MG tablet  Commonly known as:  COZAAR   100 mg, Oral, Daily      Magnesium 500 MG tablet   1 tablet, Oral, Daily, HOLD FOR SURGERY       MULTIVITAMIN ADULT PO   1 tablet, Oral, Daily, HOLD FOR SURGERY      senna 8.6 MG tablet  Commonly known as:  SENOKOT   1 tablet, Oral, As Needed      Turmeric 500 MG capsule   3 tablets, Oral, Daily, WITH BIOPERINE-TOTAL OF  1950MG HOLD FOR SURGERY              Discharge  Instructions:  · No heavy lifting, pushing, pulling greater than 10 pounds.  · No driving up until 2 weeks after surgery and no longer taking narcotics.  · Resume home diet as tolerated.  · Continue incentive spirometer at least 4 times per day.  · Remove dressing from post chest tube site after 48 hours, may shower and clean surgical sites with antibacterial soap or hydrogen peroxide, and apply gauze dressing or band-aid as needed for any drainage.  No dressing needed once no longer draining.          Follow-up Appointments  Future Appointments   Date Time Provider Department Center   9/8/2020  9:15 AM Gio Burnett III, MD [facility] None   10/16/2020  5:15 PM [facility] CT 2 [facility]   2/8/2021  3:00 PM Annette Parekh MD [facility] None     Additional Instructions for the Follow-ups that You Need to Schedule     XR Chest 2 View    Sep 07, 2020 (Approximate)      Exam reason:  s/p VATS               Test Results Pending at Discharge   Order Current Status    Fungus Culture - Tissue, Lymph Node In process    Anaerobic Culture - Tissue, Lymph Node Preliminary result          For any questions regarding patient's stay, please refer to patient's chart.    MAGDA Soto  [facility]  08/24/20  14:16    Patient was seen and assessed while wearing personal protective equipment including facemask, protective eyewear and gloves.  And hygiene performed prior to entering the room and after exiting upon discharge.    Greater than 30 minutes was spent on the unit discharging this patient with more than 50% time spent assessing the patient, counseling patient on postoperative care and discharge planning.        Electronically signed by Sofie Garsia MD at 08/24/20 5462       Deyanira Levine LCSW      Case Management   Progress Notes   Signed   Date of Service:  08/24/20 1150   Creation Time:  08/24/20 1150            Signed             Show:Clear  all  []Manual[x]Template[]Copied    Added by:  [x]Deyanira Levine LCSW    []Mamadou for details  Discharge Planning Assessment  [facility]     Patient Name: Roxy Calvert              MRN: [id number]  Today's Date: 8/24/2020                     Admit Date: 8/21/2020          Discharge Needs Assessment      Row Name 08/24/20 1149           Living Environment     Lives With  spouse     Name(s) of Who Lives With Patient  , Jack Calvert, [phone]; two children     Current Living Arrangements  home/apartment/condo     Primary Care Provided by  self     Provides Primary Care For  child(tracy)     Family Caregiver if Needed  spouse     Quality of Family Relationships  helpful;involved;supportive     Able to Return to Prior Arrangements  yes           Resource/Environmental Concerns     Resource/Environmental Concerns  none           Transition Planning     Patient/Family Anticipates Transition to  home with family     Patient/Family Anticipated Services at Transition  none     Transportation Anticipated  family or friend will provide           Discharge Needs Assessment     Concerns to be Addressed  no discharge needs identified;denies needs/concerns at this time     Equipment Currently Used at Home  none     Discharge Coordination/Progress  Home               Discharge Plan      Row Name 08/24/20 4104           Plan     Plan  Home with family     Patient/Family in Agreement with Plan  yes     Plan Comments  CCP met with pt to verify information and discuss d/c planning. Pt resides with her  and two children in a home with no accessibility concerns, and has no h/o home health or sub-acute rehab. Pt uses [facility] [address]. Pt denies known d/c needs at this time. CCP to follow. Breonna Levine LCSW              Destination       Coordination has not been started for this encounter.           Durable Medical Equipment       Coordination has not been started for this encounter.                Dialysis/Infusion       Coordination has not been started for this encounter.               Home Medical Care       Coordination has not been started for this encounter.               Therapy       Coordination has not been started for this encounter.               Community Resources       Coordination has not been started for this encounter.                  Demographic Summary      Row Name 08/24/20 1149           General Information     Admission Type  inpatient     Arrived From  home     Referral Source  admission list     Reason for Consult  discharge planning     Preferred Language  English          Functional Status      Row Name 08/24/20 1149           Functional Status     Usual Activity Tolerance  excellent     Current Activity Tolerance  excellent           Functional Status, IADL     Medications  independent     Meal Preparation  independent     Housekeeping  independent     Laundry  independent     Shopping  independent           Mental Status Summary     Recent Changes in Mental Status/Cognitive Functioning  no changes          Psychosocial    No documentation.         Abuse/Neglect    No documentation.         Legal    No documentation.         Substance Abuse    No documentation.         Patient Forms    No documentation.               Deyanira Levine LCSW

## 2020-08-26 LAB — BACTERIA SPEC ANAEROBE CULT: NORMAL

## 2020-09-01 ENCOUNTER — HOSPITAL ENCOUNTER (OUTPATIENT)
Dept: GENERAL RADIOLOGY | Facility: HOSPITAL | Age: 45
Discharge: HOME | End: 2020-09-01
Admitting: NURSE PRACTITIONER

## 2020-09-01 DIAGNOSIS — R91.8 PULMONARY NODULES/LESIONS, MULTIPLE: ICD-10-CM

## 2020-09-01 PROCEDURE — 71046 X-RAY EXAM CHEST 2 VIEWS: CPT

## 2020-09-02 ENCOUNTER — OFFICE VISIT (OUTPATIENT)
Dept: SURGERY | Facility: CLINIC | Age: 45
End: 2020-09-02

## 2020-09-02 VITALS
SYSTOLIC BLOOD PRESSURE: 122 MMHG | OXYGEN SATURATION: 99 % | HEIGHT: 62 IN | BODY MASS INDEX: 25.21 KG/M2 | WEIGHT: 137 LBS | DIASTOLIC BLOOD PRESSURE: 72 MMHG | HEART RATE: 85 BPM

## 2020-09-02 DIAGNOSIS — R91.8 LUNG NODULES: Primary | ICD-10-CM

## 2020-09-02 PROCEDURE — 99024 POSTOP FOLLOW-UP VISIT: CPT | Performed by: THORACIC SURGERY (CARDIOTHORACIC VASCULAR SURGERY)

## 2020-09-02 RX ORDER — DOCUSATE SODIUM 100 MG/1
CAPSULE, LIQUID FILLED ORAL
COMMUNITY
Start: 2020-08-21 | End: 2022-03-25 | Stop reason: SDDI

## 2020-09-02 NOTE — PROGRESS NOTES
"Subjective   Patient ID: Roxy Calvert is a 44 y.o. female is here today for follow-up.    History of Present Illness  Dear Colleague,  Roxy Calvert was seen in our office today for follow-up of the robot-assisted wedge resection left upper lobe and left lower lobe performed 2020 for multiple pulmonary nodules.  Final pathology showed no evidence of malignancy.  Postoperative course was uneventful.  Patient has been doing very well at home.  She is using her incentive spirometer regularly and is obtaining more than 2500 cc at a time.  She has little or no chest wall pain.  She is resumed her normal activities.  She has no cough.  There is no fever chills or night sweats.    The following portions of the patient's history were reviewed and updated as appropriate: allergies, current medications, past family history, past medical history, past social history, past surgical history and problem list.  Review of Systems   Constitution: Negative.   HENT: Negative.    Eyes: Negative.    Cardiovascular: Negative.    Respiratory: Negative.    Endocrine: Negative.    Hematologic/Lymphatic: Negative.    Skin: Negative.    Musculoskeletal: Negative.    Gastrointestinal: Negative.    Genitourinary: Negative.    Neurological: Negative.    Psychiatric/Behavioral: Negative.    Allergic/Immunologic: Negative.      Patient Active Problem List   Diagnosis   • Hypertension   • Anxiety   • Pulmonary nodules/lesions, multiple   • Lung nodules     Past Medical History:   Diagnosis Date   • Allergic rhinitis    • Anxiety    • Anxiety    • H/O bone density study never   • H/O complete eye exam 2008    dr qureshi   • History of hepatitis     AS CHILD   • Hypertension    • Hypertension in pregnancy    • PONV (postoperative nausea and vomiting)    • Vaginal delivery     08 DR TAN BABY \"KYDWYN\" 6.12     Past Surgical History:   Procedure Laterality Date   •  SECTION  2006 AND    • EAR TUBES     • " THORACOSCOPY Left 8/21/2020    Procedure: BRONCHOSCOPY, LEFT VIDEO ASSISTED THORACOSCOPY WITH DAVINCI ROBOT ASSISTED LEFT UPPER LOBE WEDGE RESECTION, LEFT LOWER LOBE WEDGE RESECTION, RESECTION OF MEDIASTINAL CYST,  INTERCOSTAL NERVE BLOCKS;  Surgeon: Gio Burnett III, MD;  Location: [facility];  Service: [facility];  Laterality: Left;   • TYMPANOPLASTY  1999   • WISDOM TOOTH EXTRACTION  1999     Family History   Problem Relation Age of Onset   • ALS Maternal Grandfather    • Hypertension Maternal Grandfather    • Diabetes Maternal Uncle    • Hypertension Maternal Grandmother    • No Known Problems Mother    • No Known Problems Father    • No Known Problems Sister    • No Known Problems Brother    • No Known Problems Daughter    • No Known Problems Son    • No Known Problems Paternal Grandmother    • No Known Problems Maternal Aunt    • No Known Problems Paternal Aunt    • BRCA 1/2 Neg Hx    • Breast cancer Neg Hx    • Colon cancer Neg Hx    • Endometrial cancer Neg Hx    • Ovarian cancer Neg Hx    • Malig Hyperthermia Neg Hx      Social History     Socioeconomic History   • Marital status:      Spouse name: Not on file   • Number of children: Not on file   • Years of education: Not on file   • Highest education level: Not on file   Tobacco Use   • Smoking status: Never Smoker   • Smokeless tobacco: Never Used   Substance and Sexual Activity   • Alcohol use: Yes     Comment: WEEKLY-WINE OR LIQUOR   • Drug use: Never   • Sexual activity: Defer       Current Outpatient Medications:   •  acetaminophen (TYLENOL) 500 MG tablet, Take 2 tablets by mouth 3 (Three) Times a Day for 36 doses., Disp: 72 tablet, Rfl: 0  •  busPIRone (BUSPAR) 15 MG tablet, Take 1 tablet by mouth 3 (Three) Times a Day., Disp: 270 tablet, Rfl: 3  •  CALCIUM CITRATE-VITAMIN D3 PO, Take 1 tablet by mouth Daily. 1200 MG HOLD FOR SURGERY, Disp: , Rfl:   •  celecoxib (CeleBREX) 100 MG capsule, Take 1 capsule by mouth 2 (Two) Times a Day for  30 days., Disp: 60 capsule, Rfl: 0  •  docusate sodium (Colace) 100 MG capsule, , Disp: , Rfl:   •  DULoxetine (CYMBALTA) 60 MG capsule, Take 1 capsule by mouth Daily., Disp: 90 capsule, Rfl: 3  •  gabapentin (NEURONTIN) 300 MG capsule, Take 1 capsule by mouth Every 8 (Eight) Hours for 30 days., Disp: 90 capsule, Rfl: 0  •  losartan (COZAAR) 100 MG tablet, Take 1 tablet by mouth Daily., Disp: 90 tablet, Rfl: 3  •  Magnesium 500 MG tablet, Take 1 tablet by mouth Daily. HOLD FOR SURGERY, Disp: , Rfl:   •  Multiple Vitamins-Minerals (MULTIVITAMIN ADULT PO), Take 1 tablet by mouth Daily. HOLD FOR SURGERY, Disp: , Rfl:   •  Omega-3 Fatty Acids (FISH OIL) 1200 MG capsule capsule, Take 1,200 mg by mouth Daily. EPA AND 900MG DHA HOLD FOR SURGERY, Disp: , Rfl:   •  senna (senna) 8.6 MG tablet, Take 1 tablet by mouth As Needed for Constipation., Disp: , Rfl:   •  Turmeric 500 MG capsule, Take 3 tablets by mouth Daily. WITH BIOPERINE-TOTAL OF  1950MG HOLD FOR SURGERY, Disp: , Rfl:   Allergies   Allergen Reactions   • Latex Swelling     SKIN REDNESS AND SWELLING        Objective   Vitals:    09/02/20 0845   BP: 122/72   Pulse: 85   SpO2: 99%     Physical Exam   Constitutional: She is oriented to person, place, and time. She appears well-developed and well-nourished.   HENT:   Head: Normocephalic.   Eyes: Pupils are equal, round, and reactive to light. Conjunctivae, EOM and lids are normal.   Neck: Trachea normal and normal range of motion. Neck supple. No hepatojugular reflux and no JVD present. Carotid bruit is not present. No thyroid mass and no thyromegaly present.   Cardiovascular: Normal rate, regular rhythm, S1 normal, S2 normal, normal heart sounds and normal pulses.  No extrasystoles are present. PMI is not displaced.   Pulmonary/Chest: Effort normal and breath sounds normal.   All incisions are well-healed.  No signs of infection.  No subcutaneous masses or nodules.  Chest wall is stable.  Full range of motion in both  shoulders.   Abdominal: Soft. Normal appearance and bowel sounds are normal. She exhibits no mass. There is no hepatosplenomegaly. There is no tenderness. No hernia.   Musculoskeletal: Normal range of motion.   Neurological: She is alert and oriented to person, place, and time. She has normal strength and normal reflexes. No cranial nerve deficit or sensory deficit. She displays a negative Romberg sign.   Skin: Skin is warm, dry and intact.   Psychiatric: She has a normal mood and affect. Her speech is normal and behavior is normal. Judgment and thought content normal. Cognition and memory are normal.     Independent Review of Radiographic Studies:    Chest x-ray performed yesterday was independently reviewed and compared to previous x-rays.  Postoperative changes in left lung.  No pneumothorax.  No pleural effusion.  No pulmonary infiltrates.    Pathology:    Final Diagnosis   1. Lymph Node, L9, Biopsy:                A. Single benign lymph node with anthracosis.     2. Lung, Wedge, Left Upper Lobe:                A. 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     3. Lung, Left Lower Lobe, Wedge:                A. Benign lung with atypical pneumocyte hyperplasia (atypical adenomatous hyperplasia measuring 3 mm on the                   Slide and best seen on the original frozen section).     4. Soft Tissue, Mediastinum:                A. Benign cyst with ciliated epithelium.     tyler/jse        Assessment/Plan   Assessment:  Good recovery following a robot-assisted wedge resection left lung.  Patient has resumed her normal activities.  Pathology shows no evidence of malignancy but I feel that these nodules need to be followed closely.    Plan:    Patient may return to work without restrictions.  Patient will return to see me in 4 weeks with a chest x-ray.  Patient will return to see me in 6 months  with a CT of the chest.  Case will be discussed in our multidisciplinary thoracic oncology conference.  I will keep you informed of her progress.  Thank you for allowing us to participate in the care of Ms. Calvert.      Diagnoses and all orders for this visit:    Lung nodules  -     XR Chest 2 View; Future  -     CT Chest Without Contrast; Future    Other orders  -     docusate sodium (Colace) 100 MG capsule

## 2020-09-15 NOTE — PROGRESS NOTES
Subjective     REASON FOR CONSULTATION: Germline EGFR mutation        REQUESTING PHYSICIAN: Jack Velázquez MD, Gio Burnett MD    RECORDS OBTAINED:  Records of the patients history including those obtained from the referring provider were reviewed and summarized in detail.    HISTORY OF PRESENT ILLNESS:  The patient is a 44 y.o. year old female who is here for an opinion about the above issue.    History of Present Illness      The patient is a 44-year-old female non-smoker though with a significant secondhand smoke exposure as well is a family history with her mother dying from adenocarcinoma the lung.  There is, additionally a strong family history of breast carcinoma and the patient was assessed undergoing genetic counseling.  This revealed evidence of an EGFR mutation.   The pathologic variant determined was EGFR (c.2369C>T) ([id number]).  There was also a variant of uncertain significance in a single ALK gene (c.640CT) the ALK gene is associated increased neuroblastoma though most variants of uncertain significance are usually reclassified as benign.     A review of available literature indicates that up to 20% of cases of lung cancer are familial in nature with an increased genetic predisposition greater than the general population.  Perhaps 5 to 10% may be hereditary from a pathogenic variant in the single gene that may increase the lifetime risk of lung cancer and places first-degree relatives at a 50% risk of being simply affected unless they have not inherited the pathogenic variant.  The most common gene associated with hereditary susceptibility is EGFR with T790M representing the most common variant.  These may represent up to 1.51% of lung cancer with a greater association in non-smokers, early onset in age, often described more commonly in females and over half of cases reveal a pathogenic variant in EGFR in the lung cancer specimen found in the individual who also has a germline mutation gene.     " Reports indicate that individual with a pathogenic variant the risk of lung cancer may be as high as 31% and there were no clear surveillance guidelines for infecting individual with his pathogenic variant has never had lung cancer.  This patient underwent a screening CAT scan of the chest after assessment by pulmonary medicine 2020.  This revealed multiple pulmonary nodules within both lung fields some with groundglass density and others more solid in appearance.  The largest was in the left upper lobe measuring 7 mm with no evidence of lymphadenopathy.  There is also a 3 cm left paraspinal cyst at T5 thought to be benign.     It was determined that the patient be admitted for a left robot-assisted wedge resection of the lung nodules as well as the paraspinal cyst biopsy if indicated.  This was performed 2020 with the patient going bronchoscopy in the left VAT.  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  The patient's case was discussed in thoracic conference 2020.  Plans were made for her to be seen via medical oncology and determine how we might proceed for therapeutic options and/or surveillance.  She is met with her  and we have discussed her findings over approximately 70 minutes.      Past Medical History:   Diagnosis Date   • Allergic rhinitis    • Anxiety    • H/O bone density study never   • H/O complete eye exam 2008    dr qureshi   • H/O Lung nodules    • History of hepatitis     AS CHILD   • History of multiple pulmonary nodules    • Hypertension    • Hypertension in pregnancy    • PONV (postoperative nausea and vomiting)    • Seasonal allergies    • Vaginal delivery     08 DR TAN BABY \"KYDWYN\" 6.12        Past Surgical History:   Procedure Laterality Date   •  SECTION  2006 AND    • EAR TUBES      Tympanostomy tube placement as a child multiple times   • THORACOSCOPY Left 2020    " Procedure: BRONCHOSCOPY, LEFT VIDEO ASSISTED THORACOSCOPY WITH DAVINCI ROBOT ASSISTED LEFT UPPER LOBE WEDGE RESECTION, LEFT LOWER LOBE WEDGE RESECTION, RESECTION OF MEDIASTINAL CYST,  INTERCOSTAL NERVE BLOCKS;  Surgeon: Gio Burnett III, MD;  Location: [facility];  Service: [facility];  Laterality: Left;   • TONSILLECTOMY     • TYMPANOPLASTY  1999   • WISDOM TOOTH EXTRACTION  1999        Current Outpatient Medications on File Prior to Visit   Medication Sig Dispense Refill   • busPIRone (BUSPAR) 15 MG tablet Take 1 tablet by mouth 3 (Three) Times a Day. 270 tablet 3   • CALCIUM CITRATE-VITAMIN D3 PO Take 1 tablet by mouth Daily. 1200 MG  HOLD FOR SURGERY     • celecoxib (CeleBREX) 100 MG capsule Take 1 capsule by mouth 2 (Two) Times a Day for 30 days. 60 capsule 0   • docusate sodium (Colace) 100 MG capsule      • DULoxetine (CYMBALTA) 60 MG capsule Take 1 capsule by mouth Daily. 90 capsule 3   • gabapentin (NEURONTIN) 300 MG capsule Take 1 capsule by mouth Every 8 (Eight) Hours for 30 days. 90 capsule 0   • losartan (COZAAR) 100 MG tablet Take 1 tablet by mouth Daily. 90 tablet 3   • Multiple Vitamins-Minerals (MULTIVITAMIN ADULT PO) Take 1 tablet by mouth Daily. HOLD FOR SURGERY     • Omega-3 Fatty Acids (FISH OIL) 1200 MG capsule capsule Take 1,200 mg by mouth Daily. EPA AND 900MG DHA  HOLD FOR SURGERY     • senna (senna) 8.6 MG tablet Take 1 tablet by mouth As Needed for Constipation.     • Turmeric 500 MG capsule Take 3 tablets by mouth Daily. WITH BIOPERINE-TOTAL OF  1950MG  HOLD FOR SURGERY     • Magnesium 500 MG tablet Take 1 tablet by mouth Daily. HOLD FOR SURGERY       No current facility-administered medications on file prior to visit.         ALLERGIES:    Allergies   Allergen Reactions   • Latex Swelling     SKIN REDNESS AND SWELLING        Social History     Socioeconomic History   • Marital status:      Spouse name: Jack Calderón   • Number of children: Not on file   • Years of education:  "Not on file   • Highest education level: Not on file   Occupational History     Employer: [employer]   Tobacco Use   • Smoking status: Never Smoker   • Smokeless tobacco: Never Used   Substance and Sexual Activity   • Alcohol use: Yes     Comment: WEEKLY-WINE OR LIQUOR   • Drug use: Never   • Sexual activity: Defer        Family History   Problem Relation Age of Onset   • ALS Maternal Grandfather    • Hypertension Maternal Grandfather    • Diabetes Maternal Uncle    • Hypertension Maternal Grandmother    • Lung cancer Mother    • HIV Father    • No Known Problems Sister    • No Known Problems Brother    • No Known Problems Daughter    • No Known Problems Son    • No Known Problems Paternal Grandmother    • No Known Problems Maternal Aunt    • No Known Problems Paternal Aunt    • BRCA 1/2 Neg Hx    • Breast cancer Neg Hx    • Colon cancer Neg Hx    • Endometrial cancer Neg Hx    • Ovarian cancer Neg Hx    • Malig Hyperthermia Neg Hx         Review of Systems   Constitutional: Positive for diaphoresis (night sweats). Negative for fatigue.   Respiratory: Negative for chest tightness, shortness of breath and wheezing.    Gastrointestinal: Positive for constipation. Negative for abdominal pain, diarrhea, nausea and vomiting.   Neurological: Negative for weakness.       Objective     Vitals:    09/16/20 1015   BP: 118/86   Pulse: 67   Resp: 18   Temp: 97.7 °F (36.5 °C)   TempSrc: Temporal   SpO2: 100%   Weight: 63.5 kg (139 lb 14.4 oz)   Height: 156.7 cm (61.7\")  Comment: New patient height   PainSc: 0-No pain     Current Status 9/16/2020   ECOG score 0       Physical Exam  Constitutional:       General: She is not in acute distress.     Appearance: She is normal weight. She is not ill-appearing.   HENT:      Head: Normocephalic and atraumatic.      Nose: Nose normal.      Mouth/Throat:      Mouth: Mucous membranes are moist.      Pharynx: Oropharynx is clear. No oropharyngeal exudate.   Eyes: "      Extraocular Movements: Extraocular movements intact.      Conjunctiva/sclera: Conjunctivae normal.      Pupils: Pupils are equal, round, and reactive to light.   Neck:      Musculoskeletal: Normal range of motion and neck supple.   Cardiovascular:      Rate and Rhythm: Normal rate and regular rhythm.      Pulses: Normal pulses.      Heart sounds: No murmur. No gallop.    Pulmonary:      Effort: Pulmonary effort is normal. No respiratory distress.      Breath sounds: Normal breath sounds. No wheezing or rales.   Abdominal:      General: Abdomen is flat. Bowel sounds are normal. There is no distension.      Palpations: Abdomen is soft. There is no mass.      Tenderness: There is no abdominal tenderness. There is no guarding.   Musculoskeletal: Normal range of motion.   Skin:     General: Skin is warm and dry.      Findings: No rash.   Neurological:      General: No focal deficit present.      Mental Status: She is alert and oriented to person, place, and time. Mental status is at baseline.   Psychiatric:         Mood and Affect: Mood normal.         Thought Content: Thought content normal.         RECENT LABS:  Hematology WBC   Date Value Ref Range Status   09/16/2020 5.88 3.40 - 10.80 10*3/mm3 Final   05/21/2020 6.42 3.40 - 10.80 10*3/mm3 Final     RBC   Date Value Ref Range Status   09/16/2020 4.39 3.77 - 5.28 10*6/mm3 Final   05/21/2020 4.36 3.77 - 5.28 10*6/mm3 Final     Hemoglobin   Date Value Ref Range Status   09/16/2020 13.3 12.0 - 15.9 g/dL Final     Hematocrit   Date Value Ref Range Status   09/16/2020 39.4 34.0 - 46.6 % Final     Platelets   Date Value Ref Range Status   09/16/2020 386 140 - 450 10*3/mm3 Final          Assessment/Plan     44-year-old female who recently underwent assessment after dense breast tissue was recognized with a strong family history of breast carcinoma.  She underwent genetic counseling revealing, unexpectedly, evidence of an EGFR mutation  The pathologic variant determined  was EGFR (c.2369C>T) ([id number]).  There was also a variant of uncertain significance in a single ALK gene (c.640CT).  Her additional family history includes her mother dying from adenocarcinoma of the lung after developing disease in her late 50s with a long history of tobacco smoking.    The patient was reviewed by pulmonary medicine initially with a chest x-ray that revealed potential granulomatous disease though, thereafter, chest CT demonstrated multiple pulmonary nodules several with groundglass density and others with more solid appearance.  Is also a 3 cm left paraspinal cyst thought to be benign.  Patient was referred to thoracic surgery undergoing a left robot-assisted wedge resection as well as removal of the paraspinal cyst August 21.  Final pathology revealed atypical pneumocyte hyperplasia and her case was brought to thoracic conference for assessment.  A follow-up chest CT is planned in mid October and then additional screening in March of next year.  She is seen with her  September 16 initial consultation.                                                                                       Recent review article from 2019 again describes germline mutations  Estimated EGFR with T790M associated with specific lung cancer syndrome targeted never smokers.  The mutations are rare but EGFR is considered to be a major cancer predisposition gene associated with an estimated 31% risk of lung cancer non-smoking carriers the mutation itself appears to be weakly oncogenic when associated with the common activating mutation the potential is enhanced.  73% of patients lung cancer in the germline T7 9 mutation have been found to have a second activity mutation most commonly the L858R mutation.     Such germline mutations are thought to be present in 1% of non-small cell lung cancer cases with a meeting age of diagnosis 40 years, most common histology adenocarcinoma, seemingly more common in [place]  rather than [place] and in females and non-smoking status.  The most frequent presentation was bilateral groundglass opacities and pulmonary nodules with an indolent disease course.  Screen him on selected populations has no benefit but the germline mutation is present 50% of patients with baseline T7 90 EGFR mutation in pretreatment evaluation leading to the possibility of routine germline genotyping.     The  INHERIT EGFR study from [facility] investigatived families with this variant with 105 participants.  Germline EGFR mutations were found and 63% of patients with EGFR T790M detected at lung cancer diagnosis and in 62% and 44% of first and second-degree relatives of germline carriers respectively.  The meeting age appears to be 57 years of age and 65% of cases are diagnosed at advanced age suggesting an indolent multifocal nodular phase progressed in a lymph node in the remote metastatic disease.  This is discussed with the patient and her  in in detail when seen September 16, 2020.  There is not any indication to treat her though certainly a surveillance assessment schedule is reasonable.  She and her  agree and, at this point, we plan:    *Patient return to laboratory today for baseline CMP, sed rate, LDH, CEA    *We will add to her CT scan of the chest now scheduled in October a CT abdomen pelvis with contrast    *The patient will be seen back in office approximately 1 to 2 weeks later    *We have discussed alternating every 6 months chest x-ray and low-dose CT scanning of the chest.    *She has 2 children in their teens and is herself and only child.  Subsequent screening for children would commence at age 18+.    *Pending the additional information we obtain we will likely contact [facility] group facilitating the INHERIT EGFR study.

## 2020-09-16 ENCOUNTER — LAB (OUTPATIENT)
Dept: LAB | Facility: HOSPITAL | Age: 45
End: 2020-09-16

## 2020-09-16 ENCOUNTER — CONSULT (OUTPATIENT)
Dept: ONCOLOGY | Facility: CLINIC | Age: 45
End: 2020-09-16

## 2020-09-16 VITALS
DIASTOLIC BLOOD PRESSURE: 86 MMHG | OXYGEN SATURATION: 100 % | WEIGHT: 139.9 LBS | SYSTOLIC BLOOD PRESSURE: 118 MMHG | RESPIRATION RATE: 18 BRPM | TEMPERATURE: 97.7 F | HEART RATE: 67 BPM | HEIGHT: 62 IN | BODY MASS INDEX: 25.75 KG/M2

## 2020-09-16 DIAGNOSIS — R91.8 PULMONARY NODULES/LESIONS, MULTIPLE: ICD-10-CM

## 2020-09-16 DIAGNOSIS — R91.8 LUNG NODULES: Primary | ICD-10-CM

## 2020-09-16 DIAGNOSIS — R91.8 LUNG NODULES: ICD-10-CM

## 2020-09-16 DIAGNOSIS — R91.8 PULMONARY NODULES/LESIONS, MULTIPLE: Primary | ICD-10-CM

## 2020-09-16 PROBLEM — Z15.89: Status: ACTIVE | Noted: 2020-09-16

## 2020-09-16 LAB
ALBUMIN SERPL-MCNC: 4.4 G/DL (ref 3.5–5.2)
ALBUMIN/GLOB SERPL: 1.8 G/DL (ref 1.1–2.4)
ALP SERPL-CCNC: 58 U/L (ref 38–116)
ALT SERPL W P-5'-P-CCNC: 8 U/L (ref 0–33)
ANION GAP SERPL CALCULATED.3IONS-SCNC: 8.7 MMOL/L (ref 5–15)
AST SERPL-CCNC: 15 U/L (ref 0–32)
BASOPHILS # BLD AUTO: 0.07 10*3/MM3 (ref 0–0.2)
BASOPHILS NFR BLD AUTO: 1.2 % (ref 0–1.5)
BILIRUB SERPL-MCNC: 0.6 MG/DL (ref 0.2–1.2)
BUN SERPL-MCNC: 15 MG/DL (ref 6–20)
BUN/CREAT SERPL: 25.9 (ref 7.3–30)
CALCIUM SPEC-SCNC: 9.4 MG/DL (ref 8.5–10.2)
CEA SERPL-MCNC: 1.87 NG/ML
CHLORIDE SERPL-SCNC: 102 MMOL/L (ref 98–107)
CO2 SERPL-SCNC: 26.3 MMOL/L (ref 22–29)
CREAT SERPL-MCNC: 0.58 MG/DL (ref 0.6–1.1)
DEPRECATED RDW RBC AUTO: 40.1 FL (ref 37–54)
EOSINOPHIL # BLD AUTO: 0.31 10*3/MM3 (ref 0–0.4)
EOSINOPHIL NFR BLD AUTO: 5.3 % (ref 0.3–6.2)
ERYTHROCYTE [DISTWIDTH] IN BLOOD BY AUTOMATED COUNT: 12.2 % (ref 12.3–15.4)
ERYTHROCYTE [SEDIMENTATION RATE] IN BLOOD: 6 MM/HR (ref 0–20)
GFR SERPL CREATININE-BSD FRML MDRD: 113 ML/MIN/1.73
GLOBULIN UR ELPH-MCNC: 2.5 GM/DL (ref 1.8–3.5)
GLUCOSE SERPL-MCNC: 104 MG/DL (ref 74–124)
HCT VFR BLD AUTO: 39.4 % (ref 34–46.6)
HGB BLD-MCNC: 13.3 G/DL (ref 12–15.9)
IMM GRANULOCYTES # BLD AUTO: 0.08 10*3/MM3 (ref 0–0.05)
IMM GRANULOCYTES NFR BLD AUTO: 1.4 % (ref 0–0.5)
LDH SERPL-CCNC: 134 U/L (ref 99–259)
LYMPHOCYTES # BLD AUTO: 2.42 10*3/MM3 (ref 0.7–3.1)
LYMPHOCYTES NFR BLD AUTO: 41.2 % (ref 19.6–45.3)
MCH RBC QN AUTO: 30.3 PG (ref 26.6–33)
MCHC RBC AUTO-ENTMCNC: 33.8 G/DL (ref 31.5–35.7)
MCV RBC AUTO: 89.7 FL (ref 79–97)
MONOCYTES # BLD AUTO: 0.57 10*3/MM3 (ref 0.1–0.9)
MONOCYTES NFR BLD AUTO: 9.7 % (ref 5–12)
NEUTROPHILS NFR BLD AUTO: 2.43 10*3/MM3 (ref 1.7–7)
NEUTROPHILS NFR BLD AUTO: 41.2 % (ref 42.7–76)
NRBC BLD AUTO-RTO: 0 /100 WBC (ref 0–0.2)
PLATELET # BLD AUTO: 386 10*3/MM3 (ref 140–450)
PMV BLD AUTO: 8.7 FL (ref 6–12)
POTASSIUM SERPL-SCNC: 4.4 MMOL/L (ref 3.5–4.7)
PROT SERPL-MCNC: 6.9 G/DL (ref 6.3–8)
RBC # BLD AUTO: 4.39 10*6/MM3 (ref 3.77–5.28)
SODIUM SERPL-SCNC: 137 MMOL/L (ref 134–145)
WBC # BLD AUTO: 5.88 10*3/MM3 (ref 3.4–10.8)

## 2020-09-16 PROCEDURE — 85025 COMPLETE CBC W/AUTO DIFF WBC: CPT

## 2020-09-16 PROCEDURE — 83615 LACTATE (LD) (LDH) ENZYME: CPT | Performed by: INTERNAL MEDICINE

## 2020-09-16 PROCEDURE — 80053 COMPREHEN METABOLIC PANEL: CPT | Performed by: INTERNAL MEDICINE

## 2020-09-16 PROCEDURE — 82378 CARCINOEMBRYONIC ANTIGEN: CPT | Performed by: INTERNAL MEDICINE

## 2020-09-16 PROCEDURE — 85652 RBC SED RATE AUTOMATED: CPT | Performed by: INTERNAL MEDICINE

## 2020-09-16 PROCEDURE — 99245 OFF/OP CONSLTJ NEW/EST HI 55: CPT | Performed by: INTERNAL MEDICINE

## 2020-09-16 PROCEDURE — 36415 COLL VENOUS BLD VENIPUNCTURE: CPT

## 2020-09-18 LAB — FUNGUS WND CULT: NORMAL

## 2020-09-29 ENCOUNTER — HOSPITAL ENCOUNTER (OUTPATIENT)
Dept: GENERAL RADIOLOGY | Facility: HOSPITAL | Age: 45
Discharge: HOME | End: 2020-09-29
Admitting: THORACIC SURGERY (CARDIOTHORACIC VASCULAR SURGERY)

## 2020-09-29 DIAGNOSIS — R91.8 LUNG NODULES: ICD-10-CM

## 2020-09-29 PROCEDURE — 71046 X-RAY EXAM CHEST 2 VIEWS: CPT

## 2020-09-30 ENCOUNTER — OFFICE VISIT (OUTPATIENT)
Dept: SURGERY | Facility: CLINIC | Age: 45
End: 2020-09-30

## 2020-09-30 VITALS
SYSTOLIC BLOOD PRESSURE: 122 MMHG | BODY MASS INDEX: 25.47 KG/M2 | TEMPERATURE: 97.5 F | WEIGHT: 138.4 LBS | DIASTOLIC BLOOD PRESSURE: 76 MMHG | OXYGEN SATURATION: 99 % | HEIGHT: 62 IN | HEART RATE: 64 BPM

## 2020-09-30 DIAGNOSIS — Z09 FOLLOW-UP EXAMINATION FOLLOWING SURGERY: ICD-10-CM

## 2020-09-30 DIAGNOSIS — R91.8 LUNG NODULES: Primary | ICD-10-CM

## 2020-09-30 PROCEDURE — 99024 POSTOP FOLLOW-UP VISIT: CPT | Performed by: THORACIC SURGERY (CARDIOTHORACIC VASCULAR SURGERY)

## 2020-09-30 NOTE — PROGRESS NOTES
Subjective   Patient ID: Roxy Calvert is a 44 y.o. female is here today for follow-up.    History of Present Illness  Dear Colleagues,   Roxy Calvert is here today for their first postoperative visit following a robot-assisted wedge resection left upper lobe and left lower lobe performed August 21, 2020.  Final pathology showed atypical pneumocyte hyperplasia.  Her case was discussed in our multidisciplinary thoracic oncology conference.  We have elected to follow her closely with serial CT scans.  She has been referred to Dr. Belle of the [facility] oncology group and he is planning this follow-up.    Patient has been pretty well resumed all of her normal activities.  She has no chest wall pain.  She has no pleuritic pain.  She reports no shortness of breath.  There has been no cough or hemoptysis.    The following portions of the patient's history were reviewed and updated as appropriate: allergies, current medications, past family history, past medical history, past social history, past surgical history and problem list.  Review of Systems   Constitution: Negative.   HENT: Negative.    Eyes: Negative.    Cardiovascular: Negative.    Respiratory: Negative.    Endocrine: Negative.    Hematologic/Lymphatic: Negative.    Skin: Negative.    Musculoskeletal: Negative.    Gastrointestinal: Negative.    Genitourinary: Negative.    Neurological: Negative.    Psychiatric/Behavioral: Negative.    Allergic/Immunologic: Negative.         Objective   Physical Exam  Constitutional:       Appearance: Normal appearance. She is well-developed.   HENT:      Head: Normocephalic.   Eyes:      General: Lids are normal.      Conjunctiva/sclera: Conjunctivae normal.      Pupils: Pupils are equal, round, and reactive to light.   Neck:      Musculoskeletal: Normal range of motion and neck supple.      Thyroid: No thyroid mass or thyromegaly.      Vascular: No carotid bruit, hepatojugular reflux or JVD.      Trachea: Trachea normal.    Cardiovascular:      Rate and Rhythm: Normal rate and regular rhythm.  No extrasystoles are present.     Chest Wall: PMI is not displaced.      Pulses: Normal pulses.      Heart sounds: Normal heart sounds, S1 normal and S2 normal.   Pulmonary:      Effort: Pulmonary effort is normal.      Breath sounds: Normal breath sounds.   Chest:      Comments: All incisions are well-healed.  No subcutaneous masses or nodules.  Chest wall is stable.  Abdominal:      General: Bowel sounds are normal.      Palpations: Abdomen is soft. There is no mass.      Tenderness: There is no abdominal tenderness.      Hernia: No hernia is present.   Musculoskeletal: Normal range of motion.   Skin:     General: Skin is warm and dry.   Neurological:      Mental Status: She is alert and oriented to person, place, and time.      Cranial Nerves: No cranial nerve deficit.      Sensory: No sensory deficit.      Deep Tendon Reflexes: Reflexes are normal and symmetric.   Psychiatric:         Speech: Speech normal.         Behavior: Behavior normal.         Thought Content: Thought content normal.         Judgment: Judgment normal.         Assessment/Plan   Independent Review of Radiographic Studies:    Chest x-ray performed today was independently reviewed and compared to previous x-rays.  No obvious pneumothorax.  Postsurgical changes in the left lung.  No infiltrates or masses.  No pleural effusion.      Assessment:  Patient has made an excellent recovery from her surgery.  She is scheduled for follow-up with Dr. Belle who plans following her with serial CT scans.  She may resume all of her normal activities without restrictions.  Further follow-up in our office will not be necessary.    Plan:  Return to our office as needed.  Thank you for allowing us to participate in the care of Ms. Calvert.    Diagnoses and all orders for this visit:    Lung nodules    Follow-up examination following surgery

## 2020-10-16 ENCOUNTER — TELEPHONE (OUTPATIENT)
Dept: ONCOLOGY | Facility: CLINIC | Age: 45
End: 2020-10-16

## 2020-10-16 ENCOUNTER — HOSPITAL ENCOUNTER (OUTPATIENT)
Dept: CT IMAGING | Facility: HOSPITAL | Age: 45
Discharge: HOME | End: 2020-10-16
Admitting: INTERNAL MEDICINE

## 2020-10-16 DIAGNOSIS — R91.8 PULMONARY NODULES/LESIONS, MULTIPLE: ICD-10-CM

## 2020-10-16 DIAGNOSIS — R91.8 PULMONARY NODULES: ICD-10-CM

## 2020-10-16 DIAGNOSIS — R91.8 LUNG NODULES: ICD-10-CM

## 2020-10-16 LAB — CREAT BLDA-MCNC: 0.6 MG/DL (ref 0.6–1.3)

## 2020-10-16 PROCEDURE — 74177 CT ABD & PELVIS W/CONTRAST: CPT

## 2020-10-16 PROCEDURE — 82565 ASSAY OF CREATININE: CPT

## 2020-10-16 PROCEDURE — 25010000002 IOPAMIDOL 61 % SOLUTION: Performed by: INTERNAL MEDICINE

## 2020-10-16 PROCEDURE — 71260 CT THORAX DX C+: CPT

## 2020-10-16 RX ADMIN — IOPAMIDOL 100 ML: 612 INJECTION, SOLUTION INTRAVENOUS at 18:38

## 2020-10-16 NOTE — TELEPHONE ENCOUNTER
PT CALLED WANTING TO CHANGE APPT FROM 10/26 TO 10/23 - DR. DANGELO NOT IN THAT DAY-  CALLED PATIENT BACK -LEFT MSG ON V/M THAT WILL KEEP 10/26 APPT UNLESS SHE CALLS BACK TO CHANGE

## 2020-10-19 NOTE — PROGRESS NOTES
Subjective Patient feeling well, discussed baseline scans and follow-up surveillance plans.    REASON FOR CONSULTATION: Germline EGFR mutation        REQUESTING PHYSICIAN: Jack Velázquez MD, Gio Burnett MD    History of Present Illness      The patient is a 44-year-old female non-smoker though with a significant secondhand smoke exposure as well is a family history with her mother dying from adenocarcinoma the lung.  There is, additionally a strong family history of breast carcinoma and the patient was assessed undergoing genetic counseling.  This revealed evidence of an EGFR mutation.   The pathologic variant determined was EGFR (c.2369C>T) ([id number]).  There was also a variant of uncertain significance in a single ALK gene (c.640CT) the ALK gene is associated increased neuroblastoma though most variants of uncertain significance are usually reclassified as benign.     A review of available literature indicates that up to 20% of cases of lung cancer are familial in nature with an increased genetic predisposition greater than the general population.  Perhaps 5 to 10% may be hereditary from a pathogenic variant in the single gene that may increase the lifetime risk of lung cancer and places first-degree relatives at a 50% risk of being simply affected unless they have not inherited the pathogenic variant.  The most common gene associated with hereditary susceptibility is EGFR with T790M representing the most common variant.  These may represent up to 1.51% of lung cancer with a greater association in non-smokers, early onset in age, often described more commonly in females and over half of cases reveal a pathogenic variant in EGFR in the lung cancer specimen found in the individual who also has a germline mutation gene.     Reports indicate that individual with a pathogenic variant the risk of lung cancer may be as high as 31% and there were no clear surveillance guidelines for infecting individual with his  pathogenic variant has never had lung cancer.  This patient underwent a screening CAT scan of the chest after assessment by pulmonary medicine July 14, 2020.  This revealed multiple pulmonary nodules within both lung fields some with groundglass density and others more solid in appearance.  The largest was in the left upper lobe measuring 7 mm with no evidence of lymphadenopathy.  There is also a 3 cm left paraspinal cyst at T5 thought to be benign.     It was determined that the patient be admitted for a left robot-assisted wedge resection of the lung nodules as well as the paraspinal cyst biopsy if indicated.  This was performed August 21, 2020 with the patient going bronchoscopy in the left VAT.  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  The patient's case was discussed in thoracic conference September 8, 2020.  Plans were made for her to be seen via medical oncology and determine how we might proceed for therapeutic options and/or surveillance.  She is met with her  and we have discussed her findings over approximately 70 minutes.  This led to baseline studies including a sed rate of 6, CEA of 1.87, LDH of 134, CMP with BUN/creatinine 15 and 0.58 and follow-up baseline examinations of CT of chest abdomen pelvis October 16 demonstrating her postsurgical scarring left upper lobe, stable 0.6 cm noncalcified pulmonary nodule left upper lobe with multiple groundglass opacities/nodules seen in the right upper lobe that appears stable, findings in abdomen pelvis with normal spleen, adrenal glands, kidneys, liver, gallbladder and pancreas, potential IUD extending to the myometrium-possible malposition that will need to be followed up.  The patient is seen October 2016 indicates that she feels relatively well.  She is surprised by the IUD question but sees Dr. Annette Parekh concerning this.  We have discussed follow-up that includes chest x-ray at 6 months,  "low-dose screening chest CT likely yearly.  Past Medical History:   Diagnosis Date   • Allergic rhinitis    • Anemia     as child   • Anxiety    • H/O bone density study never   • H/O complete eye exam 2008    dr qureshi   • H/O Lung nodules    • History of hepatitis     AS CHILD   • History of multiple pulmonary nodules    • Hypertension    • Hypertension in pregnancy    • PONV (postoperative nausea and vomiting)    • Seasonal allergies    • Vaginal delivery     08 DR TAN BABY \"KYDWYN\" 6.12        Past Surgical History:   Procedure Laterality Date   •  SECTION  2006 AND    • EAR TUBES      Tympanostomy tube placement as a child multiple times   • THORACOSCOPY Left 2020    Procedure: BRONCHOSCOPY, LEFT VIDEO ASSISTED THORACOSCOPY WITH DAVINCI ROBOT ASSISTED LEFT UPPER LOBE WEDGE RESECTION, LEFT LOWER LOBE WEDGE RESECTION, RESECTION OF MEDIASTINAL CYST,  INTERCOSTAL NERVE BLOCKS;  Surgeon: Gio Burnett III, MD;  Location: [facility];  Service: [facility];  Laterality: Left;   • TONSILLECTOMY     • TYMPANOPLASTY     • WISDOM TOOTH EXTRACTION          Current Outpatient Medications on File Prior to Visit   Medication Sig Dispense Refill   • busPIRone (BUSPAR) 15 MG tablet Take 1 tablet by mouth 3 (Three) Times a Day. 270 tablet 3   • CALCIUM CITRATE-VITAMIN D3 PO Take 1 tablet by mouth Daily. 1200 MG  HOLD FOR SURGERY     • docusate sodium (Colace) 100 MG capsule      • DULoxetine (CYMBALTA) 60 MG capsule Take 1 capsule by mouth Daily. 90 capsule 3   • losartan (COZAAR) 100 MG tablet Take 1 tablet by mouth Daily. 90 tablet 3   • Magnesium 500 MG tablet Take 1 tablet by mouth Daily. HOLD FOR SURGERY     • Multiple Vitamins-Minerals (MULTIVITAMIN ADULT PO) Take 1 tablet by mouth Daily. HOLD FOR SURGERY     • Omega-3 Fatty Acids (FISH OIL) 1200 MG capsule capsule Take 1,200 mg by mouth Daily. EPA AND 900MG DHA  HOLD FOR SURGERY     • senna (senna) 8.6 MG tablet Take 1 tablet " by mouth As Needed for Constipation.     • Turmeric 500 MG capsule Take 3 tablets by mouth Daily. WITH BIOPERINE-TOTAL OF  1950MG  HOLD FOR SURGERY     • [DISCONTINUED] gabapentin (NEURONTIN) 300 MG capsule Take 1 capsule by mouth Every 8 (Eight) Hours for 30 days. 90 capsule 0     No current facility-administered medications on file prior to visit.         ALLERGIES:    Allergies   Allergen Reactions   • Latex Swelling     SKIN REDNESS AND SWELLING        Social History     Socioeconomic History   • Marital status:      Spouse name: Jack Calderón   • Number of children: 2   • Years of education: College   • Highest education level: Not on file   Occupational History   • Occupation: Psychologist     Employer: [employer]   Tobacco Use   • Smoking status: Never Smoker   • Smokeless tobacco: Never Used   Substance and Sexual Activity   • Alcohol use: Yes     Comment: WEEKLY-WINE OR LIQUOR   • Drug use: Never   • Sexual activity: Defer        Family History   Problem Relation Age of Onset   • ALS Maternal Grandfather    • Hypertension Maternal Grandfather    • Diabetes Maternal Uncle    • Hypertension Maternal Grandmother    • Lung cancer Mother    • HIV Father    • No Known Problems Sister    • No Known Problems Brother    • No Known Problems Daughter    • No Known Problems Son    • No Known Problems Paternal Grandmother    • No Known Problems Maternal Aunt    • No Known Problems Paternal Aunt    • BRCA 1/2 Neg Hx    • Breast cancer Neg Hx    • Colon cancer Neg Hx    • Endometrial cancer Neg Hx    • Ovarian cancer Neg Hx    • Malig Hyperthermia Neg Hx         Review of Systems   Constitutional: Positive for diaphoresis (night sweats). Negative for fatigue.   Respiratory: Negative for chest tightness, shortness of breath and wheezing.    Gastrointestinal: Positive for constipation. Negative for abdominal pain, diarrhea, nausea and vomiting.   Neurological: Negative for weakness.  "      Objective     Vitals:    10/26/20 1459   BP: 123/82   Pulse: 75   Resp: 18   Temp: 98 °F (36.7 °C)   TempSrc: Temporal   SpO2: 99%   Weight: 62.9 kg (138 lb 11.2 oz)   Height: 156.7 cm (61.69\")   PainSc: 0-No pain     Current Status 10/26/2020   ECOG score 0       Physical Exam  Constitutional:       General: She is not in acute distress.     Appearance: She is normal weight. She is not ill-appearing.   HENT:      Head: Normocephalic and atraumatic.      Nose: Nose normal.      Mouth/Throat:      Mouth: Mucous membranes are moist.      Pharynx: Oropharynx is clear. No oropharyngeal exudate.   Eyes:      Extraocular Movements: Extraocular movements intact.      Conjunctiva/sclera: Conjunctivae normal.      Pupils: Pupils are equal, round, and reactive to light.   Neck:      Musculoskeletal: Normal range of motion and neck supple.   Cardiovascular:      Rate and Rhythm: Normal rate and regular rhythm.      Pulses: Normal pulses.      Heart sounds: No murmur. No gallop.    Pulmonary:      Effort: Pulmonary effort is normal. No respiratory distress.      Breath sounds: Normal breath sounds. No wheezing or rales.   Abdominal:      General: Abdomen is flat. Bowel sounds are normal. There is no distension.      Palpations: Abdomen is soft. There is no mass.      Tenderness: There is no abdominal tenderness. There is no guarding.   Musculoskeletal: Normal range of motion.   Skin:     General: Skin is warm and dry.      Findings: No rash.   Neurological:      General: No focal deficit present.      Mental Status: She is alert and oriented to person, place, and time. Mental status is at baseline.   Psychiatric:         Mood and Affect: Mood normal.         Thought Content: Thought content normal.         RECENT LABS:  Hematology WBC   Date Value Ref Range Status   10/26/2020 6.47 3.40 - 10.80 10*3/mm3 Final   05/21/2020 6.42 3.40 - 10.80 10*3/mm3 Final     RBC   Date Value Ref Range Status   10/26/2020 4.38 3.77 - 5.28 " 10*6/mm3 Final   05/21/2020 4.36 3.77 - 5.28 10*6/mm3 Final     Hemoglobin   Date Value Ref Range Status   10/26/2020 13.3 12.0 - 15.9 g/dL Final     Hematocrit   Date Value Ref Range Status   10/26/2020 39.9 34.0 - 46.6 % Final     Platelets   Date Value Ref Range Status   10/26/2020 381 140 - 450 10*3/mm3 Final      EXAMINATIONS: CT OF THE CHEST WITH CONTRAST AND CT OF ABDOMEN PELVIS  WITH CONTRAST 10/16/2020    FINDINGS OF THE CHEST: There has been interval development of  postsurgical/postprocedural scarring within the left upper lobe. A  stable 0.6 cm noncalcified pulmonary nodule in the left upper lobe is  noted. There are multiple ground glass opacities/nodules seen in the  right upper lobe that appear stable. On image 29 there is a stable 0.8  cm noncalcified ground glass pulmonary nodule in the right upper lobe.  Other than those nodules that were resected in the left lung, the  visualized subcentimeter ground glass nodules/opacities on the prior  examination are all stable. No new nodules are visualized. The heart and  great vessels have a normal appearance. There is no evidence for  mediastinal or hilar adenopathy.     FINDINGS OF ABDOMEN AND PELVIS: The spleen, adrenal glands, kidneys,  liver, gallbladder and pancreas appear normal. Moderate retention of  stool is seen throughout the colon. There is no evidence for  intra-abdominal or pelvic adenopathy. A normal appendix is visualized.  An IUD is seen with the posterior limb of the IUD extending through the  myometrium posteriorly to abut the serosa. No stranding surrounding the  uterus is appreciated. A 2.4 cm left ovarian cyst most consistent with a  dominant follicle is noted. No free fluid is seen within the abdomen or  pelvis. The visualized osseous structures appear normal.     IMPRESSION:  1. There has been interval surgery and/or procedural changes of the left  lung. Other than those which were surgically removed with the procedure,  the  previously noted subcentimeter ground glass nodular opacities in  both lungs appear stable.  2. There is visualization of an IUD whose posterior limb appears to  extend through the myometrium and abut the posterior serosa. Clinical  follow-up is recommended to determine if this is malpositioned. Pelvic  sonography may be indicated.     Radiation dose reduction techniques were utilized, including automated  exposure control and exposure modulation based on body size.    Assessment/Plan     44-year-old female who recently underwent assessment after dense breast tissue was recognized with a strong family history of breast carcinoma.  She underwent genetic counseling revealing, unexpectedly, evidence of an EGFR mutation  The pathologic variant determined was EGFR (c.2369C>T) ([id number]).  There was also a variant of uncertain significance in a single ALK gene (c.640CT).  Her additional family history includes her mother dying from adenocarcinoma of the lung after developing disease in her late 50s with a long history of tobacco smoking.    The patient was reviewed by pulmonary medicine initially with a chest x-ray that revealed potential granulomatous disease though, thereafter, chest CT demonstrated multiple pulmonary nodules several with groundglass density and others with more solid appearance.  Is also a 3 cm left paraspinal cyst thought to be benign.  Patient was referred to thoracic surgery undergoing a left robot-assisted wedge resection as well as removal of the paraspinal cyst August 21.  Final pathology revealed atypical pneumocyte hyperplasia and her case was brought to thoracic conference for assessment.  A follow-up chest CT is planned in mid October and then additional screening in March of next year.  She is seen with her  September 16 initial consultation.                                                                                       Recent review article from 2019 again describes  germline mutations  Estimated EGFR with T790M associated with specific lung cancer syndrome targeted never smokers.  The mutations are rare but EGFR is considered to be a major cancer predisposition gene associated with an estimated 31% risk of lung cancer non-smoking carriers the mutation itself appears to be weakly oncogenic when associated with the common activating mutation the potential is enhanced.  73% of patients lung cancer in the germline T7 9 mutation have been found to have a second activity mutation most commonly the L858R mutation.     Such germline mutations are thought to be present in 1% of non-small cell lung cancer cases with a meeting age of diagnosis 40 years, most common histology adenocarcinoma, seemingly more common in [place] rather than [place] and in females and non-smoking status.  The most frequent presentation was bilateral groundglass opacities and pulmonary nodules with an indolent disease course.  Screen him on selected populations has no benefit but the germline mutation is present 50% of patients with baseline T7 90 EGFR mutation in pretreatment evaluation leading to the possibility of routine germline genotyping.     The  INHERIT EGFR study from [facility] investigatived families with this variant with 105 participants.  Germline EGFR mutations were found and 63% of patients with EGFR T790M detected at lung cancer diagnosis and in 62% and 44% of first and second-degree relatives of germline carriers respectively.  The meeting age appears to be 57 years of age and 65% of cases are diagnosed at advanced age suggesting an indolent multifocal nodular phase progressed in a lymph node in the remote metastatic disease.  This is discussed with the patient and her  in in detail when seen September 16, 2020.  There is not any indication to treat her though certainly a surveillance assessment schedule is reasonable.  She and her  agree and, at this point, and we proceeded  to assess with baseline studies and repeat CT scanning.  Fortunately there is no evidence of any additional or progressive disease in this patient.      Plan:      *Patient agreeable to screening exams including a 6-month follow-up chest x-ray, yearly low-dose CT scan    *Referral to Dr. Annette Parekh for her IUD findings

## 2020-10-26 ENCOUNTER — LAB (OUTPATIENT)
Dept: LAB | Facility: HOSPITAL | Age: 45
End: 2020-10-26

## 2020-10-26 ENCOUNTER — TELEPHONE (OUTPATIENT)
Dept: OBSTETRICS AND GYNECOLOGY | Age: 45
End: 2020-10-26

## 2020-10-26 ENCOUNTER — OFFICE VISIT (OUTPATIENT)
Dept: ONCOLOGY | Facility: CLINIC | Age: 45
End: 2020-10-26

## 2020-10-26 VITALS
RESPIRATION RATE: 18 BRPM | TEMPERATURE: 98 F | OXYGEN SATURATION: 99 % | WEIGHT: 138.7 LBS | HEIGHT: 62 IN | DIASTOLIC BLOOD PRESSURE: 82 MMHG | HEART RATE: 75 BPM | SYSTOLIC BLOOD PRESSURE: 123 MMHG | BODY MASS INDEX: 25.52 KG/M2

## 2020-10-26 DIAGNOSIS — R91.8 LUNG NODULES: Primary | ICD-10-CM

## 2020-10-26 DIAGNOSIS — Z15.89 MONOALLELIC MUTATION OF EGFR GENE: ICD-10-CM

## 2020-10-26 DIAGNOSIS — Z30.431 IUD CHECK UP: Primary | ICD-10-CM

## 2020-10-26 DIAGNOSIS — Z15.09 MONOALLELIC MUTATION OF EGFR GENE: ICD-10-CM

## 2020-10-26 DIAGNOSIS — R91.8 PULMONARY NODULES/LESIONS, MULTIPLE: ICD-10-CM

## 2020-10-26 LAB
ALBUMIN SERPL-MCNC: 4.5 G/DL (ref 3.5–5.2)
ALBUMIN/GLOB SERPL: 1.7 G/DL (ref 1.1–2.4)
ALP SERPL-CCNC: 65 U/L (ref 38–116)
ALT SERPL W P-5'-P-CCNC: 12 U/L (ref 0–33)
ANION GAP SERPL CALCULATED.3IONS-SCNC: 11 MMOL/L (ref 5–15)
AST SERPL-CCNC: 17 U/L (ref 0–32)
BASOPHILS # BLD AUTO: 0.07 10*3/MM3 (ref 0–0.2)
BASOPHILS NFR BLD AUTO: 1.1 % (ref 0–1.5)
BILIRUB SERPL-MCNC: 0.4 MG/DL (ref 0.2–1.2)
BUN SERPL-MCNC: 13 MG/DL (ref 6–20)
BUN/CREAT SERPL: 21.7 (ref 7.3–30)
CALCIUM SPEC-SCNC: 9.4 MG/DL (ref 8.5–10.2)
CHLORIDE SERPL-SCNC: 102 MMOL/L (ref 98–107)
CO2 SERPL-SCNC: 27 MMOL/L (ref 22–29)
CREAT SERPL-MCNC: 0.6 MG/DL (ref 0.6–1.1)
DEPRECATED RDW RBC AUTO: 41.1 FL (ref 37–54)
EOSINOPHIL # BLD AUTO: 0.12 10*3/MM3 (ref 0–0.4)
EOSINOPHIL NFR BLD AUTO: 1.9 % (ref 0.3–6.2)
ERYTHROCYTE [DISTWIDTH] IN BLOOD BY AUTOMATED COUNT: 12.2 % (ref 12.3–15.4)
GFR SERPL CREATININE-BSD FRML MDRD: 109 ML/MIN/1.73
GLOBULIN UR ELPH-MCNC: 2.7 GM/DL (ref 1.8–3.5)
GLUCOSE SERPL-MCNC: 93 MG/DL (ref 74–124)
HCT VFR BLD AUTO: 39.9 % (ref 34–46.6)
HGB BLD-MCNC: 13.3 G/DL (ref 12–15.9)
IMM GRANULOCYTES # BLD AUTO: 0.02 10*3/MM3 (ref 0–0.05)
IMM GRANULOCYTES NFR BLD AUTO: 0.3 % (ref 0–0.5)
LYMPHOCYTES # BLD AUTO: 2.53 10*3/MM3 (ref 0.7–3.1)
LYMPHOCYTES NFR BLD AUTO: 39.1 % (ref 19.6–45.3)
MCH RBC QN AUTO: 30.4 PG (ref 26.6–33)
MCHC RBC AUTO-ENTMCNC: 33.3 G/DL (ref 31.5–35.7)
MCV RBC AUTO: 91.1 FL (ref 79–97)
MONOCYTES # BLD AUTO: 0.5 10*3/MM3 (ref 0.1–0.9)
MONOCYTES NFR BLD AUTO: 7.7 % (ref 5–12)
NEUTROPHILS NFR BLD AUTO: 3.23 10*3/MM3 (ref 1.7–7)
NEUTROPHILS NFR BLD AUTO: 49.9 % (ref 42.7–76)
NRBC BLD AUTO-RTO: 0 /100 WBC (ref 0–0.2)
PLATELET # BLD AUTO: 381 10*3/MM3 (ref 140–450)
PMV BLD AUTO: 8.2 FL (ref 6–12)
POTASSIUM SERPL-SCNC: 4.4 MMOL/L (ref 3.5–4.7)
PROT SERPL-MCNC: 7.2 G/DL (ref 6.3–8)
RBC # BLD AUTO: 4.38 10*6/MM3 (ref 3.77–5.28)
SODIUM SERPL-SCNC: 140 MMOL/L (ref 134–145)
WBC # BLD AUTO: 6.47 10*3/MM3 (ref 3.4–10.8)

## 2020-10-26 PROCEDURE — 80053 COMPREHEN METABOLIC PANEL: CPT

## 2020-10-26 PROCEDURE — 36415 COLL VENOUS BLD VENIPUNCTURE: CPT

## 2020-10-26 PROCEDURE — 85025 COMPLETE CBC W/AUTO DIFF WBC: CPT

## 2020-10-26 PROCEDURE — 99214 OFFICE O/P EST MOD 30 MIN: CPT | Performed by: INTERNAL MEDICINE

## 2020-10-26 NOTE — TELEPHONE ENCOUNTER
([facility] Pt)     [facility] called asking for another US for placement of IUD. Stated they did a CT scan and looks like the IUD had moved.     Please advise where to schedule     Jagruti- [phone]

## 2020-11-23 ENCOUNTER — OFFICE VISIT (OUTPATIENT)
Dept: OBSTETRICS AND GYNECOLOGY | Age: 45
End: 2020-11-23

## 2020-11-23 ENCOUNTER — PROCEDURE VISIT (OUTPATIENT)
Dept: OBSTETRICS AND GYNECOLOGY | Age: 45
End: 2020-11-23

## 2020-11-23 VITALS
WEIGHT: 139 LBS | HEIGHT: 62 IN | SYSTOLIC BLOOD PRESSURE: 110 MMHG | BODY MASS INDEX: 25.58 KG/M2 | DIASTOLIC BLOOD PRESSURE: 68 MMHG

## 2020-11-23 DIAGNOSIS — Z30.430 ENCOUNTER FOR IUD INSERTION: ICD-10-CM

## 2020-11-23 DIAGNOSIS — T83.32XA IUD MIGRATION, INITIAL ENCOUNTER: ICD-10-CM

## 2020-11-23 DIAGNOSIS — Z30.432 ENCOUNTER FOR IUD REMOVAL: Primary | ICD-10-CM

## 2020-11-23 DIAGNOSIS — Z30.431 IUD CHECK UP: Primary | ICD-10-CM

## 2020-11-23 PROCEDURE — 81025 URINE PREGNANCY TEST: CPT | Performed by: NURSE PRACTITIONER

## 2020-11-23 PROCEDURE — 76830 TRANSVAGINAL US NON-OB: CPT | Performed by: OBSTETRICS & GYNECOLOGY

## 2020-11-23 PROCEDURE — 58300 INSERT INTRAUTERINE DEVICE: CPT | Performed by: NURSE PRACTITIONER

## 2020-11-23 PROCEDURE — 99213 OFFICE O/P EST LOW 20 MIN: CPT | Performed by: NURSE PRACTITIONER

## 2020-11-23 PROCEDURE — 58301 REMOVE INTRAUTERINE DEVICE: CPT | Performed by: NURSE PRACTITIONER

## 2020-11-23 NOTE — PROGRESS NOTES
"Subjective     Chief Complaint   Patient presents with   • Gynecologic Exam     iud check       Roxy Calvert is a 45 y.o.  whose LMP is No LMP recorded. Patient has had an implant.     Pt presents today for IUD check  It was subsequently seen on CT scan that IUD had moved  U/S reveals IUD in lower uterine segment today  Patient does not have periods with IUD  She has noticed some increased cramping   She would like to have this removed and replaced today  She has no other concerns or complaints       No Additional Complaints Reported    The following portions of the patient's history were reviewed and updated as appropriate:vital signs, allergies, current medications, past medical history, past social history, past surgical history and problem list      Review of Systems   A comprehensive review of systems was negative except for: Genitourinary: positive for IUD check, pelvic cramping     Objective      /68   Ht 156.2 cm (61.5\")   Wt 63 kg (139 lb)   BMI 25.84 kg/m²     Physical Exam    General:   alert and no distress   Heart: Not performed today   Lungs: Not performed today.   Breast: Not performed today   Neck: n/a   Abdomen: {Not performed today   CVA: Not performed today   Pelvis: External genitalia: normal general appearance  Urinary system: urethral meatus normal  Vaginal: normal mucosa without prolapse or lesions, normal without tenderness, induration or masses and normal rugae  Cervix: normal appearance and IUD string visualized   Extremities: Not performed today   Neurologic: negative   Psychiatric: Normal affect, judgement, and mood       Lab Review   Labs: No data reviewed     Imaging   Ultrasound - Pelvic Vaginal - IUD in lower uterine segment    Assessment/Plan     ASSESSMENT  1. Encounter for IUD removal    2. Encounter for IUD insertion    3. IUD migration, initial encounter        PLAN  1. No orders of the defined types were placed in this encounter.      2. Medications " prescribed this encounter:      No orders of the defined types were placed in this encounter.      Pt desires to have this IUD removed and new one replaced     Follow up: 6 week(s) IUD string check    Heike WassermanMAGDA  11/23/2020    IUD Removal    Date of procedure:  11/23/2020    Risks and benefits discussed? yes  All questions answered? yes  Consents given by The patient  Reason for removal: Failed IUD - in lower uterine segment        Procedure documentation:    A speculum was placed in order to view the cervix.  .  The IUD string was easily seen.  The string was grasped and the IUD was removed without difficulty.  The IUD did not appear to be adherent to the uterine cavity. It was removed intact.    She tolerated the procedure without any difficulty.     Post procedure instructions: Patient notified to call with heavy bleeding, fever or increasing pain.            IUD Insertion    No LMP recorded. Patient has had an implant.    Date of procedure:  11/23/2020    Risks and benefits discussed? yes  All questions answered? yes  Consents given by The patient  Written consent obtained? yes    Procedure documentation:     Urine pregnancy test was done and was NEGATIVE .  The risks (including infection,  bleeding, pain, and uterine perforation) and benefits of the procedure were  explained to the patient and Written informed consent was  obtained.    After verifying the patient had a low probability of being pregnant and met the criteria for insertion, a sterile speculum has placed and the cervix was cleansed with an antiseptic solution.  Vaginal discharge was scant.  The anterior lip of the cervix was grasped with an allis and the uterine cavity was gently sounded. There was no difficulty passing the sound through the cervix.  Cervical dilation did not need to be performed prior to placing the IUD.  The uterus was anteverted and sounded to 7 cms.  The Mirena was then prepared per the manufacturers  instructions.    The Mirena was advanced to a point 2 cms from the fundus and then the arms were released from the sheath.  The device was advanced to the fundus and the device was released fully from the sheath.. The string was cut 3 cms in length.  Bleeding from the cervix was scant.    She tolerated the procedure without any difficulty.    Post procedure instructions: The patient was advised to call for any fever or for  prolonged or severe pain or bleeding. Pelvic rest  advised for 2 wks    Follow up needed: 6 weeks for IUD check    U/S confirms IUD placement in endometrial cavity

## 2021-01-04 ENCOUNTER — OFFICE VISIT (OUTPATIENT)
Dept: OBSTETRICS AND GYNECOLOGY | Age: 46
End: 2021-01-04

## 2021-01-04 VITALS
WEIGHT: 144 LBS | HEIGHT: 62 IN | DIASTOLIC BLOOD PRESSURE: 70 MMHG | BODY MASS INDEX: 26.5 KG/M2 | SYSTOLIC BLOOD PRESSURE: 114 MMHG

## 2021-01-04 DIAGNOSIS — Z30.431 IUD CHECK UP: Primary | ICD-10-CM

## 2021-01-04 PROCEDURE — 99212 OFFICE O/P EST SF 10 MIN: CPT | Performed by: NURSE PRACTITIONER

## 2021-01-04 NOTE — PROGRESS NOTES
"Subjective     Chief Complaint   Patient presents with   • Gynecologic Exam     IUD check       Roxy Calvert is a 45 y.o.  whose LMP is No LMP recorded (lmp unknown). Patient has had an implant.     Pt presents today for IUD string check  She has had no bleeding or pain  Has not checked strings herself  No complaints      No Additional Complaints Reported    The following portions of the patient's history were reviewed and updated as appropriate:vital signs, allergies, current medications, past medical history, past social history, past surgical history and problem list      Review of Systems   A comprehensive review of systems was negative except for: IUD string check     Objective      /70   Ht 156.2 cm (61.5\")   Wt 65.3 kg (144 lb)   LMP  (LMP Unknown)   BMI 26.77 kg/m²     Physical Exam    General:   alert and no distress   Heart: Not performed today   Lungs: Not performed today.   Breast: Not performed today   Neck: na   Abdomen: {Not performed today   CVA: Not performed today   Pelvis: External genitalia: normal general appearance  Urinary system: urethral meatus normal  Vaginal: normal mucosa without prolapse or lesions, normal without tenderness, induration or masses and normal rugae  Cervix: normal appearance and IUD string visualized   Extremities: Not performed today   Neurologic: negative   Psychiatric: Normal affect, judgement, and mood       Lab Review   Labs: No data reviewed     Imaging   No data reviewed    Assessment/Plan     ASSESSMENT  1. IUD check up        PLAN  1. No orders of the defined types were placed in this encounter.      2. Medications prescribed this encounter:      No orders of the defined types were placed in this encounter.      3. IUD strings noted at cervical os    Follow up: Next month with Dr. Iglesia Wasserman, APRN  2021           "

## 2021-01-07 ENCOUNTER — APPOINTMENT (OUTPATIENT)
Dept: WOMENS IMAGING | Facility: HOSPITAL | Age: 46
End: 2021-01-07

## 2021-01-07 PROCEDURE — 77063 BREAST TOMOSYNTHESIS BI: CPT | Performed by: RADIOLOGY

## 2021-01-07 PROCEDURE — 77067 SCR MAMMO BI INCL CAD: CPT | Performed by: RADIOLOGY

## 2021-02-08 ENCOUNTER — OFFICE VISIT (OUTPATIENT)
Dept: OBSTETRICS AND GYNECOLOGY | Age: 46
End: 2021-02-08

## 2021-02-08 VITALS
HEIGHT: 62 IN | BODY MASS INDEX: 25.58 KG/M2 | WEIGHT: 139 LBS | DIASTOLIC BLOOD PRESSURE: 80 MMHG | SYSTOLIC BLOOD PRESSURE: 122 MMHG

## 2021-02-08 DIAGNOSIS — Z01.419 WELL WOMAN EXAM WITH ROUTINE GYNECOLOGICAL EXAM: Primary | ICD-10-CM

## 2021-02-08 DIAGNOSIS — R61 NIGHT SWEATS: ICD-10-CM

## 2021-02-08 PROCEDURE — 99396 PREV VISIT EST AGE 40-64: CPT | Performed by: OBSTETRICS & GYNECOLOGY

## 2021-02-08 NOTE — PROGRESS NOTES
"Routine Annual Visit    2021    Patient: Roxy Calvert          MR#:[id number]      Chief Complaint   Patient presents with   • Gynecologic Exam     AE today.  Breast u/s bilateral in  at [facility].  2019 pap = neg.  IUD removed 20, and replaced. (mirena). Cc: night sweats. No cycles with IUD.          History of Present Illness    45 y.o. female  who presents for annual exam.   UTD pap   Just had mammo and got results normal  mirena working well  Having some night sweats  Reviewed medical history, invitae showed a mutation and at risk for lung cancer  Got precanerous nodules removed  CT scan yearly for surveillance        No LMP recorded. (Menstrual status: Other).  Obstetric History:  OB History        4    Para   3    Term   2       1    AB        Living   2       SAB        TAB        Ectopic        Molar        Multiple        Live Births   2               Menstrual History:     No LMP recorded. (Menstrual status: Other).       Sexual History:       ________________________________________  Patient Active Problem List   Diagnosis   • Hypertension   • Anxiety   • Pulmonary nodules/lesions, multiple   • Lung nodules   • Monoallelic mutation of EGFR gene       Past Medical History:   Diagnosis Date   • Allergic rhinitis    • Anemia     as child   • Anxiety    • Encounter for IUD insertion 2020    MIRENA    • H/O bone density study never   • H/O complete eye exam 2008    dr qureshi   • H/O Lung nodules    • History of hepatitis     AS CHILD   • History of multiple pulmonary nodules    • Hypertension    • Hypertension in pregnancy    • PONV (postoperative nausea and vomiting)    • Seasonal allergies    • Vaginal delivery     08 DR TAN BABY \"KYDWYN\" 6.12       Past Surgical History:   Procedure Laterality Date   •  SECTION  2006 AND    • EAR TUBES      Tympanostomy tube placement as a child multiple times   • THORACOSCOPY Left 2020    Procedure: " "BRONCHOSCOPY, LEFT VIDEO ASSISTED THORACOSCOPY WITH DAVINCI ROBOT ASSISTED LEFT UPPER LOBE WEDGE RESECTION, LEFT LOWER LOBE WEDGE RESECTION, RESECTION OF MEDIASTINAL CYST,  INTERCOSTAL NERVE BLOCKS;  Surgeon: Gio Burnett III, MD;  Location: [facility];  Service: [facility];  Laterality: Left;   • TONSILLECTOMY     • TYMPANOPLASTY  1999   • WISDOM TOOTH EXTRACTION  1999       Social History     Tobacco Use   Smoking Status Never Smoker   Smokeless Tobacco Never Used       has a current medication list which includes the following prescription(s): buspirone, calcium citrate-vitamin d, docusate sodium, losartan, magnesium, multiple vitamin, fish oil, senna, turmeric, duloxetine, and mirena (52 mg).  ________________________________________    Current contraception: IUD  History of abnormal Pap smear: no  Family history of Breast cancer: yesFamily history of uterine or ovarian cancer: no  Family History of colon cancer/colon polyps: no  History of abnormal mammogram: no      The following portions of the patient's history were reviewed and updated as appropriate: allergies, current medications, past family history, past medical history, past social history, past surgical history and problem list.    Review of Systems    Pertinent items are noted in HPI.     Objective   Physical Exam    /80   Ht 156.2 cm (61.5\")   Wt 63 kg (139 lb)   Breastfeeding No   BMI 25.84 kg/m²    BP Readings from Last 3 Encounters:   02/08/21 122/80   01/04/21 114/70   11/23/20 110/68      Wt Readings from Last 3 Encounters:   02/08/21 63 kg (139 lb)   01/04/21 65.3 kg (144 lb)   11/23/20 63 kg (139 lb)      BMI: Estimated body mass index is 25.84 kg/m² as calculated from the following:    Height as of this encounter: 156.2 cm (61.5\").    Weight as of this encounter: 63 kg (139 lb).      General:   alert, appears stated age and cooperative   Abdomen: soft, non-tender, without masses or organomegaly   Breast: inspection negative, " no nipple discharge or bleeding, no masses or nodularity palpable   Vulva: normal   Vagina: normal mucosa   Cervix: no cervical motion tenderness and no lesions   Uterus: normal size, mobile or non-tender   Adnexa: no mass, fullness, tenderness     Assessment:    1. Normal annual exam   Assessment     ICD-10-CM ICD-9-CM   1. Well woman exam with routine gynecological exam  Z01.419 V72.31   2. Night sweats  R61 780.8     Plan:    Plan     []  Mammogram request made  []  PAP done  [x]  Labs:   []  GC/Chl/TV  []  DEXA scan   []  Referral for colonoscopy:       Diagnoses and all orders for this visit:    1. Well woman exam with routine gynecological exam (Primary)    2. Night sweats  -     Follicle Stimulating Hormone  -     TSH      Consider CSC next year      Counseling:  --Nutrition: Stressed importance of moderation and caloric balance, stressed fresh fruit and vegetables  --Exercise: Stressed the importance of regular exercise. 3-5 times weekly   - Discussed screening mammogram recommendations.   --Discussed benefits of screening colonoscopy- age 45 unless FH  --Discussed pap smear screening recommendations

## 2021-02-09 LAB
FSH SERPL-ACNC: 4.3 MIU/ML
TSH SERPL DL<=0.005 MIU/L-ACNC: 1.24 UIU/ML (ref 0.27–4.2)

## 2021-03-08 ENCOUNTER — APPOINTMENT (OUTPATIENT)
Dept: CT IMAGING | Facility: HOSPITAL | Age: 46
End: 2021-03-08

## 2021-04-02 ENCOUNTER — BULK ORDERING (OUTPATIENT)
Dept: CASE MANAGEMENT | Facility: OTHER | Age: 46
End: 2021-04-02

## 2021-04-02 DIAGNOSIS — Z23 IMMUNIZATION DUE: ICD-10-CM

## 2021-04-08 ENCOUNTER — LAB (OUTPATIENT)
Dept: LAB | Facility: HOSPITAL | Age: 46
End: 2021-04-08

## 2021-04-08 ENCOUNTER — HOSPITAL ENCOUNTER (OUTPATIENT)
Dept: GENERAL RADIOLOGY | Facility: HOSPITAL | Age: 46
Discharge: HOME | End: 2021-04-08
Admitting: INTERNAL MEDICINE

## 2021-04-08 DIAGNOSIS — R91.8 PULMONARY NODULES/LESIONS, MULTIPLE: ICD-10-CM

## 2021-04-08 DIAGNOSIS — Z15.89 MONOALLELIC MUTATION OF EGFR GENE: ICD-10-CM

## 2021-04-08 DIAGNOSIS — Z15.09 MONOALLELIC MUTATION OF EGFR GENE: ICD-10-CM

## 2021-04-08 LAB
ALBUMIN SERPL-MCNC: 4.2 G/DL (ref 3.5–5.2)
ALBUMIN/GLOB SERPL: 1.5 G/DL (ref 1.1–2.4)
ALP SERPL-CCNC: 49 U/L (ref 38–116)
ALT SERPL W P-5'-P-CCNC: 12 U/L (ref 0–33)
ANION GAP SERPL CALCULATED.3IONS-SCNC: 7.5 MMOL/L (ref 5–15)
AST SERPL-CCNC: 14 U/L (ref 0–32)
BASOPHILS # BLD AUTO: 0.06 10*3/MM3 (ref 0–0.2)
BASOPHILS NFR BLD AUTO: 1 % (ref 0–1.5)
BILIRUB SERPL-MCNC: 0.5 MG/DL (ref 0.2–1.2)
BUN SERPL-MCNC: 10 MG/DL (ref 6–20)
BUN/CREAT SERPL: 15.2 (ref 7.3–30)
CALCIUM SPEC-SCNC: 9.9 MG/DL (ref 8.5–10.2)
CHLORIDE SERPL-SCNC: 103 MMOL/L (ref 98–107)
CO2 SERPL-SCNC: 28.5 MMOL/L (ref 22–29)
CREAT SERPL-MCNC: 0.66 MG/DL (ref 0.6–1.1)
DEPRECATED RDW RBC AUTO: 40.8 FL (ref 37–54)
EOSINOPHIL # BLD AUTO: 0.17 10*3/MM3 (ref 0–0.4)
EOSINOPHIL NFR BLD AUTO: 2.9 % (ref 0.3–6.2)
ERYTHROCYTE [DISTWIDTH] IN BLOOD BY AUTOMATED COUNT: 12.3 % (ref 12.3–15.4)
GFR SERPL CREATININE-BSD FRML MDRD: 97 ML/MIN/1.73
GLOBULIN UR ELPH-MCNC: 2.8 GM/DL (ref 1.8–3.5)
GLUCOSE SERPL-MCNC: 116 MG/DL (ref 74–124)
HCT VFR BLD AUTO: 39.7 % (ref 34–46.6)
HGB BLD-MCNC: 13.3 G/DL (ref 12–15.9)
IMM GRANULOCYTES # BLD AUTO: 0 10*3/MM3 (ref 0–0.05)
IMM GRANULOCYTES NFR BLD AUTO: 0 % (ref 0–0.5)
LYMPHOCYTES # BLD AUTO: 3.05 10*3/MM3 (ref 0.7–3.1)
LYMPHOCYTES NFR BLD AUTO: 52.5 % (ref 19.6–45.3)
MCH RBC QN AUTO: 30 PG (ref 26.6–33)
MCHC RBC AUTO-ENTMCNC: 33.5 G/DL (ref 31.5–35.7)
MCV RBC AUTO: 89.6 FL (ref 79–97)
MONOCYTES # BLD AUTO: 0.48 10*3/MM3 (ref 0.1–0.9)
MONOCYTES NFR BLD AUTO: 8.3 % (ref 5–12)
NEUTROPHILS NFR BLD AUTO: 2.05 10*3/MM3 (ref 1.7–7)
NEUTROPHILS NFR BLD AUTO: 35.3 % (ref 42.7–76)
NRBC BLD AUTO-RTO: 0 /100 WBC (ref 0–0.2)
PLATELET # BLD AUTO: 365 10*3/MM3 (ref 140–450)
PMV BLD AUTO: 8.3 FL (ref 6–12)
POTASSIUM SERPL-SCNC: 4.6 MMOL/L (ref 3.5–4.7)
PROT SERPL-MCNC: 7 G/DL (ref 6.3–8)
RBC # BLD AUTO: 4.43 10*6/MM3 (ref 3.77–5.28)
SODIUM SERPL-SCNC: 139 MMOL/L (ref 134–145)
WBC # BLD AUTO: 5.81 10*3/MM3 (ref 3.4–10.8)

## 2021-04-08 PROCEDURE — 85025 COMPLETE CBC W/AUTO DIFF WBC: CPT

## 2021-04-08 PROCEDURE — 36415 COLL VENOUS BLD VENIPUNCTURE: CPT

## 2021-04-08 PROCEDURE — 71046 X-RAY EXAM CHEST 2 VIEWS: CPT

## 2021-04-08 PROCEDURE — 80053 COMPREHEN METABOLIC PANEL: CPT

## 2021-04-16 ENCOUNTER — APPOINTMENT (OUTPATIENT)
Dept: LAB | Facility: HOSPITAL | Age: 46
End: 2021-04-16

## 2021-04-16 ENCOUNTER — OFFICE VISIT (OUTPATIENT)
Dept: ONCOLOGY | Facility: CLINIC | Age: 46
End: 2021-04-16

## 2021-04-16 VITALS
HEIGHT: 62 IN | SYSTOLIC BLOOD PRESSURE: 128 MMHG | OXYGEN SATURATION: 98 % | TEMPERATURE: 98 F | WEIGHT: 139 LBS | DIASTOLIC BLOOD PRESSURE: 81 MMHG | HEART RATE: 85 BPM | BODY MASS INDEX: 25.58 KG/M2 | RESPIRATION RATE: 16 BRPM

## 2021-04-16 DIAGNOSIS — Z15.09 MONOALLELIC MUTATION OF EGFR GENE: Primary | ICD-10-CM

## 2021-04-16 DIAGNOSIS — Z15.89 MONOALLELIC MUTATION OF EGFR GENE: Primary | ICD-10-CM

## 2021-04-16 DIAGNOSIS — R91.8 LUNG NODULES: ICD-10-CM

## 2021-04-16 PROCEDURE — 99213 OFFICE O/P EST LOW 20 MIN: CPT | Performed by: INTERNAL MEDICINE

## 2021-04-16 NOTE — PROGRESS NOTES
Subjective Patient feeling well, discussed baseline scans and follow-up surveillance plans.    REASON FOR CONSULTATION: Germline EGFR mutation        REQUESTING PHYSICIAN: Jack Velázquez MD, Gio Burnett MD    History of Present Illness      The patient is a 44-year-old female non-smoker though with a significant secondhand smoke exposure as well is a family history with her mother dying from adenocarcinoma the lung.  There is, additionally a strong family history of breast carcinoma and the patient was assessed undergoing genetic counseling.  This revealed evidence of an EGFR mutation.   The pathologic variant determined was EGFR (c.2369C>T) (p.[id number]).  There was also a variant of uncertain significance in a single ALK gene (c.640CT) the ALK gene is associated increased neuroblastoma though most variants of uncertain significance are usually reclassified as benign.     A review of available literature indicates that up to 20% of cases of lung cancer are familial in nature with an increased genetic predisposition greater than the general population.  Perhaps 5 to 10% may be hereditary from a pathogenic variant in the single gene that may increase the lifetime risk of lung cancer and places first-degree relatives at a 50% risk of being simply affected unless they have not inherited the pathogenic variant.  The most common gene associated with hereditary susceptibility is EGFR with T790M representing the most common variant.  These may represent up to 1.51% of lung cancer with a greater association in non-smokers, early onset in age, often described more commonly in females and over half of cases reveal a pathogenic variant in EGFR in the lung cancer specimen found in the individual who also has a germline mutation gene.     Reports indicate that individual with a pathogenic variant the risk of lung cancer may be as high as 31% and there were no clear surveillance guidelines for infecting individual with his  pathogenic variant has never had lung cancer.  This patient underwent a screening CAT scan of the chest after assessment by pulmonary medicine July 14, 2020.  This revealed multiple pulmonary nodules within both lung fields some with groundglass density and others more solid in appearance.  The largest was in the left upper lobe measuring 7 mm with no evidence of lymphadenopathy.  There is also a 3 cm left paraspinal cyst at T5 thought to be benign.     It was determined that the patient be admitted for a left robot-assisted wedge resection of the lung nodules as well as the paraspinal cyst biopsy if indicated.  This was performed August 21, 2020 with the patient going bronchoscopy in the left VAT.  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  The patient's case was discussed in thoracic conference September 8, 2020.  Plans were made for her to be seen via medical oncology and determine how we might proceed for therapeutic options and/or surveillance.  She is met with her  and we have discussed her findings over approximately 70 minutes.  This led to baseline studies including a sed rate of 6, CEA of 1.87, LDH of 134, CMP with BUN/creatinine 15 and 0.58 and follow-up baseline examinations of CT of chest abdomen pelvis October 16 demonstrating her postsurgical scarring left upper lobe, stable 0.6 cm noncalcified pulmonary nodule left upper lobe with multiple groundglass opacities/nodules seen in the right upper lobe that appears stable, findings in abdomen pelvis with normal spleen, adrenal glands, kidneys, liver, gallbladder and pancreas, potential IUD extending to the myometrium-possible malposition that will need to be followed up.  The patient is seen October 2016 indicates that she feels relatively well.  She is surprised by the IUD question but sees Dr. Annette Parekh concerning this.  We have discussed follow-up that includes chest x-ray at 6 months,  "low-dose screening chest CT likely yearly.  The patient is next seen 2021.  She has undergone a follow-up chest x-ray reveals no acute abnormalities.  She is feeling well with no additional changes in her performance status.  We discussed a follow-up low-dose scan scheduled least yearly.  The IUD issue has been addressed, incidentally, by replacement in the interval.      Past Medical History:   Diagnosis Date   • Allergic rhinitis    • Anemia     as child   • Anxiety    • Encounter for IUD insertion 2020    MIRENA    • H/O bone density study never   • H/O complete eye exam 2008    dr qureshi   • H/O Lung nodules    • History of hepatitis     AS CHILD   • History of multiple pulmonary nodules    • Hypertension    • Hypertension in pregnancy    • PONV (postoperative nausea and vomiting)    • Seasonal allergies    • Vaginal delivery     08 DR TAN BABY \"KYDWYN\" 6.12        Past Surgical History:   Procedure Laterality Date   •  SECTION  2006 AND    • EAR TUBES      Tympanostomy tube placement as a child multiple times   • THORACOSCOPY Left 2020    Procedure: BRONCHOSCOPY, LEFT VIDEO ASSISTED THORACOSCOPY WITH DAVINCI ROBOT ASSISTED LEFT UPPER LOBE WEDGE RESECTION, LEFT LOWER LOBE WEDGE RESECTION, RESECTION OF MEDIASTINAL CYST,  INTERCOSTAL NERVE BLOCKS;  Surgeon: Gio Burnett III, MD;  Location: [facility];  Service: [facility];  Laterality: Left;   • TONSILLECTOMY     • TYMPANOPLASTY     • WISDOM TOOTH EXTRACTION          Current Outpatient Medications on File Prior to Visit   Medication Sig Dispense Refill   • busPIRone (BUSPAR) 15 MG tablet Take 1 tablet by mouth 3 (Three) Times a Day. 270 tablet 3   • CALCIUM CITRATE-VITAMIN D3 PO Take 1 tablet by mouth Daily. 1200 MG  HOLD FOR SURGERY     • docusate sodium (Colace) 100 MG capsule      • DULoxetine (CYMBALTA) 60 MG capsule Take 1 capsule by mouth Daily. 90 capsule 3   • levonorgestrel (Mirena, 52 MG,) 20 " MCG/24HR IUD 1 each by Intrauterine route 1 (One) Time.     • losartan (COZAAR) 100 MG tablet Take 1 tablet by mouth Daily. 90 tablet 3   • Magnesium 500 MG tablet Take 1 tablet by mouth Daily. HOLD FOR SURGERY     • Multiple Vitamins-Minerals (MULTIVITAMIN ADULT PO) Take 1 tablet by mouth Daily. HOLD FOR SURGERY     • Omega-3 Fatty Acids (FISH OIL) 1200 MG capsule capsule Take 1,200 mg by mouth Daily. EPA AND 900MG DHA  HOLD FOR SURGERY     • senna (senna) 8.6 MG tablet Take 1 tablet by mouth As Needed for Constipation.     • Turmeric 500 MG capsule Take 3 tablets by mouth Daily. WITH BIOPERINE-TOTAL OF  1950MG  HOLD FOR SURGERY       No current facility-administered medications on file prior to visit.        ALLERGIES:    Allergies   Allergen Reactions   • Latex Swelling     SKIN REDNESS AND SWELLING        Social History     Socioeconomic History   • Marital status:      Spouse name: Jack Calderón   • Number of children: 2   • Years of education: College   • Highest education level: Not on file   Tobacco Use   • Smoking status: Never Smoker   • Smokeless tobacco: Never Used   Vaping Use   • Vaping Use: Never used   Substance and Sexual Activity   • Alcohol use: Yes     Comment: WEEKLY-WINE OR LIQUOR   • Drug use: Never   • Sexual activity: Defer     Birth control/protection: I.U.D.        Family History   Problem Relation Age of Onset   • ALS Maternal Grandfather    • Hypertension Maternal Grandfather    • Diabetes Maternal Uncle    • Hypertension Maternal Grandmother    • Lung cancer Mother    • HIV Father    • No Known Problems Daughter    • No Known Problems Son    • No Known Problems Paternal Grandmother    • No Known Problems Maternal Aunt    • No Known Problems Paternal Aunt    • No Known Problems Paternal Grandfather    • BRCA 1/2 Neg Hx    • Breast cancer Neg Hx    • Colon cancer Neg Hx    • Endometrial cancer Neg Hx    • Ovarian cancer Neg Hx    • Malig Hyperthermia Neg Hx         Review of Systems  "  Constitutional: Positive for diaphoresis (night sweats). Negative for fatigue.   Respiratory: Negative for chest tightness, shortness of breath and wheezing.    Gastrointestinal: Positive for constipation. Negative for abdominal pain, diarrhea, nausea and vomiting.   Neurological: Negative for weakness.       Objective     Vitals:    04/16/21 1510   BP: 128/81   Pulse: 85   Resp: 16   Temp: 98 °F (36.7 °C)   TempSrc: Temporal   SpO2: 98%   Weight: 63 kg (139 lb)   Height: 156.7 cm (61.69\")   PainSc: 0-No pain     Current Status 4/16/2021   ECOG score 1       Physical Exam  Constitutional:       General: She is not in acute distress.     Appearance: She is normal weight. She is not ill-appearing.   HENT:      Head: Normocephalic and atraumatic.      Nose: Nose normal.      Mouth/Throat:      Mouth: Mucous membranes are moist.      Pharynx: Oropharynx is clear. No oropharyngeal exudate.   Eyes:      Extraocular Movements: Extraocular movements intact.      Conjunctiva/sclera: Conjunctivae normal.      Pupils: Pupils are equal, round, and reactive to light.   Cardiovascular:      Rate and Rhythm: Normal rate and regular rhythm.      Pulses: Normal pulses.      Heart sounds: No murmur heard.   No gallop.    Pulmonary:      Effort: Pulmonary effort is normal. No respiratory distress.      Breath sounds: Normal breath sounds. No wheezing or rales.   Abdominal:      General: Abdomen is flat. Bowel sounds are normal. There is no distension.      Palpations: Abdomen is soft. There is no mass.      Tenderness: There is no abdominal tenderness. There is no guarding.   Musculoskeletal:         General: Normal range of motion.      Cervical back: Normal range of motion and neck supple.   Skin:     General: Skin is warm and dry.      Findings: No rash.   Neurological:      General: No focal deficit present.      Mental Status: She is alert and oriented to person, place, and time. Mental status is at baseline.   Psychiatric:    "      Mood and Affect: Mood normal.         Thought Content: Thought content normal.         RECENT LABS:  Hematology WBC   Date Value Ref Range Status   04/08/2021 5.81 3.40 - 10.80 10*3/mm3 Final   05/21/2020 6.42 3.40 - 10.80 10*3/mm3 Final     RBC   Date Value Ref Range Status   04/08/2021 4.43 3.77 - 5.28 10*6/mm3 Final   05/21/2020 4.36 3.77 - 5.28 10*6/mm3 Final     Hemoglobin   Date Value Ref Range Status   04/08/2021 13.3 12.0 - 15.9 g/dL Final     Hematocrit   Date Value Ref Range Status   04/08/2021 39.7 34.0 - 46.6 % Final     Platelets   Date Value Ref Range Status   04/08/2021 365 140 - 450 10*3/mm3 Final      EXAMINATIONS: CT OF THE CHEST WITH CONTRAST AND CT OF ABDOMEN PELVIS  WITH CONTRAST 10/16/2020    FINDINGS OF THE CHEST: There has been interval development of  postsurgical/postprocedural scarring within the left upper lobe. A  stable 0.6 cm noncalcified pulmonary nodule in the left upper lobe is  noted. There are multiple ground glass opacities/nodules seen in the  right upper lobe that appear stable. On image 29 there is a stable 0.8  cm noncalcified ground glass pulmonary nodule in the right upper lobe.  Other than those nodules that were resected in the left lung, the  visualized subcentimeter ground glass nodules/opacities on the prior  examination are all stable. No new nodules are visualized. The heart and  great vessels have a normal appearance. There is no evidence for  mediastinal or hilar adenopathy.     FINDINGS OF ABDOMEN AND PELVIS: The spleen, adrenal glands, kidneys,  liver, gallbladder and pancreas appear normal. Moderate retention of  stool is seen throughout the colon. There is no evidence for  intra-abdominal or pelvic adenopathy. A normal appendix is visualized.  An IUD is seen with the posterior limb of the IUD extending through the  myometrium posteriorly to abut the serosa. No stranding surrounding the  uterus is appreciated. A 2.4 cm left ovarian cyst most consistent with  a  dominant follicle is noted. No free fluid is seen within the abdomen or  pelvis. The visualized osseous structures appear normal.     IMPRESSION:  1. There has been interval surgery and/or procedural changes of the left  lung. Other than those which were surgically removed with the procedure,  the previously noted subcentimeter ground glass nodular opacities in  both lungs appear stable.  2. There is visualization of an IUD whose posterior limb appears to  extend through the myometrium and abut the posterior serosa. Clinical  follow-up is recommended to determine if this is malpositioned. Pelvic  sonography may be indicated.     Radiation dose reduction techniques were utilized, including automated  exposure control and exposure modulation based on body size.    Assessment/Plan     44-year-old female who recently underwent assessment after dense breast tissue was recognized with a strong family history of breast carcinoma.  She underwent genetic counseling revealing, unexpectedly, evidence of an EGFR mutation  The pathologic variant determined was EGFR (c.2369C>T) (p.[id number]).  There was also a variant of uncertain significance in a single ALK gene (c.640CT).  Her additional family history includes her mother dying from adenocarcinoma of the lung after developing disease in her late 50s with a long history of tobacco smoking.    The patient was reviewed by pulmonary medicine initially with a chest x-ray that revealed potential granulomatous disease though, thereafter, chest CT demonstrated multiple pulmonary nodules several with groundglass density and others with more solid appearance.  Is also a 3 cm left paraspinal cyst thought to be benign.  Patient was referred to thoracic surgery undergoing a left robot-assisted wedge resection as well as removal of the paraspinal cyst August 21.  Final pathology revealed atypical pneumocyte hyperplasia and her case was brought to thoracic conference for assessment.  A  follow-up chest CT is planned in mid October and then additional screening in March of next year.  She is seen with her  September 16 initial consultation.                                                                                       Recent review article from 2019 again describes germline mutations  Estimated EGFR with T790M associated with specific lung cancer syndrome targeted never smokers.  The mutations are rare but EGFR is considered to be a major cancer predisposition gene associated with an estimated 31% risk of lung cancer non-smoking carriers the mutation itself appears to be weakly oncogenic when associated with the common activating mutation the potential is enhanced.  73% of patients lung cancer in the germline T7 9 mutation have been found to have a second activity mutation most commonly the L858R mutation.     Such germline mutations are thought to be present in 1% of non-small cell lung cancer cases with a meeting age of diagnosis 40 years, most common histology adenocarcinoma, seemingly more common in [place] rather than [place] and in females and non-smoking status.  The most frequent presentation was bilateral groundglass opacities and pulmonary nodules with an indolent disease course.  Screen him on selected populations has no benefit but the germline mutation is present 50% of patients with baseline T7 90 EGFR mutation in pretreatment evaluation leading to the possibility of routine germline genotyping.     The  INHERIT EGFR study from [facility] investigatived families with this variant with 105 participants.  Germline EGFR mutations were found and 63% of patients with EGFR T790M detected at lung cancer diagnosis and in 62% and 44% of first and second-degree relatives of germline carriers respectively.  The meeting age appears to be 57 years of age and 65% of cases are diagnosed at advanced age suggesting an indolent multifocal nodular phase progressed in a lymph node in the  remote metastatic disease.  This is discussed with the patient and her  in in detail when seen September 16, 2020.  There is not any indication to treat her though certainly a surveillance assessment schedule is reasonable.  She and her  agree and, at this point, and we proceeded to assess with baseline studies and repeat CT scanning.  Fortunately there is no evidence of any additional or progressive disease in this patient.    The patient return for follow-up chest x-ray April 8, 2021 with no acute process.  The patient clinically feels well and, incidentally, had her IUD replaced after her previous discussion here in office concerning findings on her CT scans.  She is feeling well with an excellent performance status we discussed a follow-up yearly low-dose CT scan of chest to which she is agreeable.    Plan:      *Schedule low-dose CT scan in 23 weeks    *24 weeks MD follow-up, cbc

## 2021-04-19 ENCOUNTER — TELEPHONE (OUTPATIENT)
Dept: GENERAL RADIOLOGY | Facility: HOSPITAL | Age: 46
End: 2021-04-19

## 2021-04-19 NOTE — TELEPHONE ENCOUNTER
----- Message from Ashley Jones RN sent at 4/16/2021  4:40 PM EDT -----  Please schedule pt for CT of chest without contrast (low dose protocol)

## 2021-04-19 NOTE — TELEPHONE ENCOUNTER
PT HAS BEEN SCHEDULED WK PRIOR FOR HER CT CHEST WITHOUT CONTRAST - BUT LOW DOSE PROTOCOL ADDED TO THE SYSTEM

## 2021-04-20 ENCOUNTER — TELEPHONE (OUTPATIENT)
Dept: ONCOLOGY | Facility: CLINIC | Age: 46
End: 2021-04-20

## 2021-04-20 NOTE — TELEPHONE ENCOUNTER
Dr. Belle would like to use CT chest without contrast, low dose protocol as pt will be needing scans indefinitely. Message sent to scheduling.

## 2021-06-21 DIAGNOSIS — F41.9 ANXIETY: ICD-10-CM

## 2021-06-24 ENCOUNTER — TELEPHONE (OUTPATIENT)
Dept: FAMILY MEDICINE CLINIC | Facility: CLINIC | Age: 46
End: 2021-06-24

## 2021-06-24 ENCOUNTER — TELEMEDICINE (OUTPATIENT)
Dept: FAMILY MEDICINE CLINIC | Facility: CLINIC | Age: 46
End: 2021-06-24

## 2021-06-24 VITALS — BODY MASS INDEX: 25.58 KG/M2 | HEIGHT: 62 IN | WEIGHT: 139 LBS

## 2021-06-24 DIAGNOSIS — F41.9 ANXIETY: Chronic | ICD-10-CM

## 2021-06-24 DIAGNOSIS — I10 ESSENTIAL HYPERTENSION: Primary | Chronic | ICD-10-CM

## 2021-06-24 PROCEDURE — 99214 OFFICE O/P EST MOD 30 MIN: CPT | Performed by: FAMILY MEDICINE

## 2021-06-24 RX ORDER — DULOXETIN HYDROCHLORIDE 60 MG/1
60 CAPSULE, DELAYED RELEASE ORAL DAILY
Qty: 90 CAPSULE | Refills: 3 | Status: SHIPPED | OUTPATIENT
Start: 2021-06-24 | End: 2022-08-17

## 2021-06-24 RX ORDER — DULOXETIN HYDROCHLORIDE 60 MG/1
CAPSULE, DELAYED RELEASE ORAL
Qty: 30 CAPSULE | Refills: 0 | OUTPATIENT
Start: 2021-06-24

## 2021-06-24 RX ORDER — BUSPIRONE HYDROCHLORIDE 15 MG/1
15 TABLET ORAL 3 TIMES DAILY
Qty: 270 TABLET | Refills: 3 | Status: SHIPPED | OUTPATIENT
Start: 2021-06-24 | End: 2023-02-14

## 2021-06-24 RX ORDER — TOBRAMYCIN AND DEXAMETHASONE 3; 1 MG/ML; MG/ML
SUSPENSION/ DROPS OPHTHALMIC
COMMUNITY
Start: 2021-03-25 | End: 2021-09-28 | Stop reason: SDDI

## 2021-06-24 RX ORDER — LOSARTAN POTASSIUM 100 MG/1
100 TABLET ORAL DAILY
Qty: 90 TABLET | Refills: 3 | Status: SHIPPED | OUTPATIENT
Start: 2021-06-24 | End: 2022-08-17 | Stop reason: SDUPTHER

## 2021-06-24 NOTE — TELEPHONE ENCOUNTER
Patient called in stating her pharmacy has requested a re-fill for DULoxetine (CYMBALTA) 60 MG capsule  multiple times [facility]    Please call back to richy @ [phone]

## 2021-06-24 NOTE — PROGRESS NOTES
Subjective   Roxy Calvert is a 45 y.o. female.     CC: Video Visit for Medical Management    History of Present Illness     Chief Complaint:   Chief Complaint   Patient presents with   • Anxiety     med refill        Roxy Calvert 45 y.o. female who presents today for Medical Management of the below listed issues and medication refills.  she has a problem list of   Patient Active Problem List   Diagnosis   • Hypertension   • Anxiety   • Pulmonary nodules/lesions, multiple   • Lung nodules   • Monoallelic mutation of EGFR gene   .  Since the last visit, she has overall felt well.  she has been compliant with   Current Outpatient Medications:   •  busPIRone (BUSPAR) 15 MG tablet, Take 1 tablet by mouth 3 (Three) Times a Day., Disp: 270 tablet, Rfl: 3  •  CALCIUM CITRATE-VITAMIN D3 PO, Take 1 tablet by mouth Daily. 1200 MG HOLD FOR SURGERY, Disp: , Rfl:   •  docusate sodium (Colace) 100 MG capsule, , Disp: , Rfl:   •  DULoxetine (CYMBALTA) 60 MG capsule, Take 1 capsule by mouth Daily., Disp: 90 capsule, Rfl: 3  •  levonorgestrel (Mirena, 52 MG,) 20 MCG/24HR IUD, 1 each by Intrauterine route 1 (One) Time., Disp: , Rfl:   •  losartan (COZAAR) 100 MG tablet, Take 1 tablet by mouth Daily., Disp: 90 tablet, Rfl: 3  •  Magnesium 500 MG tablet, Take 1 tablet by mouth Daily. HOLD FOR SURGERY, Disp: , Rfl:   •  Multiple Vitamins-Minerals (MULTIVITAMIN ADULT PO), Take 1 tablet by mouth Daily. HOLD FOR SURGERY, Disp: , Rfl:   •  Omega-3 Fatty Acids (FISH OIL) 1200 MG capsule capsule, Take 1,200 mg by mouth Daily. EPA AND 900MG DHA HOLD FOR SURGERY, Disp: , Rfl:   •  senna (senna) 8.6 MG tablet, Take 1 tablet by mouth As Needed for Constipation., Disp: , Rfl:   •  tobramycin-dexamethasone (TOBRADEX) 0.3-0.1 % ophthalmic suspension, USE 4 DROPS IN THE LEFT EAR TWICE A DAY FOR 7 DAYS, Disp: , Rfl:   •  Turmeric 500 MG capsule, Take 3 tablets by mouth Daily. WITH BIOPERINE-TOTAL OF  1950MG HOLD FOR SURGERY, Disp: , Rfl: .   "she denies medication side effects.    All of the other chronic condition(s) listed above are stable w/o issues.    Ht 156.7 cm (61.69\")   Wt 63 kg (139 lb)   BMI 25.68 kg/m²     Results for orders placed or performed in visit on 04/08/21   Comprehensive Metabolic Panel    Specimen: Blood   Result Value Ref Range    Glucose 116 74 - 124 mg/dL    BUN 10 6 - 20 mg/dL    Creatinine 0.66 0.60 - 1.10 mg/dL    Sodium 139 134 - 145 mmol/L    Potassium 4.6 3.5 - 4.7 mmol/L    Chloride 103 98 - 107 mmol/L    CO2 28.5 22.0 - 29.0 mmol/L    Calcium 9.9 8.5 - 10.2 mg/dL    Total Protein 7.0 6.3 - 8.0 g/dL    Albumin 4.20 3.50 - 5.20 g/dL    ALT (SGPT) 12 0 - 33 U/L    AST (SGOT) 14 0 - 32 U/L    Alkaline Phosphatase 49 38 - 116 U/L    Total Bilirubin 0.5 0.2 - 1.2 mg/dL    eGFR Non African Amer 97 >60 mL/min/1.73    Globulin 2.8 1.8 - 3.5 gm/dL    A/G Ratio 1.5 1.1 - 2.4 g/dL    BUN/Creatinine Ratio 15.2 7.3 - 30.0    Anion Gap 7.5 5.0 - 15.0 mmol/L   CBC Auto Differential    Specimen: Blood   Result Value Ref Range    WBC 5.81 3.40 - 10.80 10*3/mm3    RBC 4.43 3.77 - 5.28 10*6/mm3    Hemoglobin 13.3 12.0 - 15.9 g/dL    Hematocrit 39.7 34.0 - 46.6 %    MCV 89.6 79.0 - 97.0 fL    MCH 30.0 26.6 - 33.0 pg    MCHC 33.5 31.5 - 35.7 g/dL    RDW 12.3 12.3 - 15.4 %    RDW-SD 40.8 37.0 - 54.0 fl    MPV 8.3 6.0 - 12.0 fL    Platelets 365 140 - 450 10*3/mm3    Neutrophil % 35.3 (L) 42.7 - 76.0 %    Lymphocyte % 52.5 (H) 19.6 - 45.3 %    Monocyte % 8.3 5.0 - 12.0 %    Eosinophil % 2.9 0.3 - 6.2 %    Basophil % 1.0 0.0 - 1.5 %    Immature Grans % 0.0 0.0 - 0.5 %    Neutrophils, Absolute 2.05 1.70 - 7.00 10*3/mm3    Lymphocytes, Absolute 3.05 0.70 - 3.10 10*3/mm3    Monocytes, Absolute 0.48 0.10 - 0.90 10*3/mm3    Eosinophils, Absolute 0.17 0.00 - 0.40 10*3/mm3    Basophils, Absolute 0.06 0.00 - 0.20 10*3/mm3    Immature Grans, Absolute 0.00 0.00 - 0.05 10*3/mm3    nRBC 0.0 0.0 - 0.2 /100 WBC           The following portions of the " patient's history were reviewed and updated as appropriate: allergies, current medications, past family history, past medical history, past social history, past surgical history and problem list.    Review of Systems   Constitutional: Negative for activity change, chills and fever.   Respiratory: Negative for cough.    Cardiovascular: Negative for chest pain.   Psychiatric/Behavioral: Negative for dysphoric mood.       Objective   Physical Exam  Constitutional:       General: She is not in acute distress.     Appearance: She is well-developed.   Pulmonary:      Effort: Pulmonary effort is normal.   Neurological:      Mental Status: She is alert and oriented to person, place, and time.   Psychiatric:         Behavior: Behavior normal.         Thought Content: Thought content normal.     Recent labs from another provider reviewed by me at today's visit.    Assessment/Plan   Diagnoses and all orders for this visit:    1. Essential hypertension (Primary)  -     losartan (COZAAR) 100 MG tablet; Take 1 tablet by mouth Daily.  Dispense: 90 tablet; Refill: 3    2. Anxiety  -     DULoxetine (CYMBALTA) 60 MG capsule; Take 1 capsule by mouth Daily.  Dispense: 90 capsule; Refill: 3  -     busPIRone (BUSPAR) 15 MG tablet; Take 1 tablet by mouth 3 (Three) Times a Day.  Dispense: 270 tablet; Refill: 3    Spent  14   minutes with chart and interview and consent for this encounter given by the patient.  You have chosen to receive care through a telehealth visit.  Do you consent to use a video/audio connection for your medical care today? Yes

## 2021-08-23 ENCOUNTER — TELEPHONE (OUTPATIENT)
Dept: ONCOLOGY | Facility: CLINIC | Age: 46
End: 2021-08-23

## 2021-08-23 NOTE — TELEPHONE ENCOUNTER
DELETE AFTER REVIEWING: Telephone encounter to be sent to the clinical pool     Caller: Roxy Calvert    Relationship to patient: Self    Best call back number: [phone]    Chief complaint: PATIENT WOULD LIKE TO RESCHEDULE HER LAB/APPT.  FOR 9/30/21.  HUB UNABLE TO SCHEDULE LAB.    Type of visit: LAB/FU    Requested date: SEPT. 17TH  OR FIRST AVAILABLE.      If rescheduling, when is the original appointment: 9/30/21    Additional notes: PATIENT ALSO HAS A CT SCAN THAT WILL NEED TO BE RESCHEDULED TO COORDINATE WITH OFFICE APPT.      PLEASE CALL PATIENT TO ADVISE.  LEAVE VM IF NO ANSWER.

## 2021-08-24 ENCOUNTER — TELEPHONE (OUTPATIENT)
Dept: ONCOLOGY | Facility: CLINIC | Age: 46
End: 2021-08-24

## 2021-08-24 NOTE — TELEPHONE ENCOUNTER
Caller: YOMI    Relationship to patient: SELF    Best call back number: [phone]    Patient is needing: TO R/S LAB AND F/U APPT FROM 9- TO AFTER 9- WHEN HER CT IS SCHEDULED.    PLEASE CALL AROUND LUNCH TIME TO UPDATE THE PATIENT.

## 2021-09-24 ENCOUNTER — HOSPITAL ENCOUNTER (OUTPATIENT)
Dept: CT IMAGING | Facility: HOSPITAL | Age: 46
Discharge: HOME | End: 2021-09-24
Admitting: INTERNAL MEDICINE

## 2021-09-24 DIAGNOSIS — Z15.09 MONOALLELIC MUTATION OF EGFR GENE: ICD-10-CM

## 2021-09-24 DIAGNOSIS — R91.8 LUNG NODULES: ICD-10-CM

## 2021-09-24 DIAGNOSIS — Z15.89 MONOALLELIC MUTATION OF EGFR GENE: ICD-10-CM

## 2021-09-24 PROCEDURE — 71250 CT THORAX DX C-: CPT

## 2021-09-28 ENCOUNTER — LAB (OUTPATIENT)
Dept: OTHER | Facility: HOSPITAL | Age: 46
End: 2021-09-28

## 2021-09-28 ENCOUNTER — OFFICE VISIT (OUTPATIENT)
Dept: ONCOLOGY | Facility: CLINIC | Age: 46
End: 2021-09-28

## 2021-09-28 VITALS
TEMPERATURE: 98.3 F | WEIGHT: 149.8 LBS | RESPIRATION RATE: 16 BRPM | SYSTOLIC BLOOD PRESSURE: 128 MMHG | OXYGEN SATURATION: 98 % | DIASTOLIC BLOOD PRESSURE: 85 MMHG | HEART RATE: 83 BPM | BODY MASS INDEX: 27.57 KG/M2 | HEIGHT: 62 IN

## 2021-09-28 DIAGNOSIS — R91.8 PULMONARY NODULES/LESIONS, MULTIPLE: Primary | ICD-10-CM

## 2021-09-28 LAB
BASOPHILS # BLD AUTO: 0.08 10*3/MM3 (ref 0–0.2)
BASOPHILS NFR BLD AUTO: 1 % (ref 0–1.5)
DEPRECATED RDW RBC AUTO: 38.2 FL (ref 37–54)
EOSINOPHIL # BLD AUTO: 0.21 10*3/MM3 (ref 0–0.4)
EOSINOPHIL NFR BLD AUTO: 2.8 % (ref 0.3–6.2)
ERYTHROCYTE [DISTWIDTH] IN BLOOD BY AUTOMATED COUNT: 12 % (ref 12.3–15.4)
HCT VFR BLD AUTO: 40.1 % (ref 34–46.6)
HGB BLD-MCNC: 13.8 G/DL (ref 12–15.9)
IMM GRANULOCYTES # BLD AUTO: 0.04 10*3/MM3 (ref 0–0.05)
IMM GRANULOCYTES NFR BLD AUTO: 0.5 % (ref 0–0.5)
LYMPHOCYTES # BLD AUTO: 2.88 10*3/MM3 (ref 0.7–3.1)
LYMPHOCYTES NFR BLD AUTO: 37.8 % (ref 19.6–45.3)
MCH RBC QN AUTO: 29.9 PG (ref 26.6–33)
MCHC RBC AUTO-ENTMCNC: 34.4 G/DL (ref 31.5–35.7)
MCV RBC AUTO: 86.8 FL (ref 79–97)
MONOCYTES # BLD AUTO: 0.57 10*3/MM3 (ref 0.1–0.9)
MONOCYTES NFR BLD AUTO: 7.5 % (ref 5–12)
NEUTROPHILS NFR BLD AUTO: 3.84 10*3/MM3 (ref 1.7–7)
NEUTROPHILS NFR BLD AUTO: 50.4 % (ref 42.7–76)
NRBC BLD AUTO-RTO: 0 /100 WBC (ref 0–0.2)
PLATELET # BLD AUTO: 366 10*3/MM3 (ref 140–450)
PMV BLD AUTO: 9 FL (ref 6–12)
RBC # BLD AUTO: 4.62 10*6/MM3 (ref 3.77–5.28)
WBC # BLD AUTO: 7.62 10*3/MM3 (ref 3.4–10.8)

## 2021-09-28 PROCEDURE — 99214 OFFICE O/P EST MOD 30 MIN: CPT | Performed by: INTERNAL MEDICINE

## 2021-09-28 PROCEDURE — 36415 COLL VENOUS BLD VENIPUNCTURE: CPT

## 2021-09-28 PROCEDURE — 86769 SARS-COV-2 COVID-19 ANTIBODY: CPT | Performed by: INTERNAL MEDICINE

## 2021-09-28 PROCEDURE — 85025 COMPLETE CBC W/AUTO DIFF WBC: CPT | Performed by: INTERNAL MEDICINE

## 2021-09-28 RX ORDER — METRONIDAZOLE 7.5 MG/G
LOTION TOPICAL
COMMUNITY
Start: 2021-09-24 | End: 2022-03-25 | Stop reason: SDDI

## 2021-09-28 NOTE — PROGRESS NOTES
Subjective Patient feeling well, discussed repeat CT scan without change, concern for vaccination status for COVID-19    REASON FOR CONSULTATION: Germline EGFR mutation        REQUESTING PHYSICIAN: Jack Velázquez MD, Gio Burnett MD    Actually        The patient is a 44-year-old female non-smoker though with a significant secondhand smoke exposure as well is a family history with her mother dying from adenocarcinoma the lung.  There is, additionally a strong family history of breast carcinoma and the patient was assessed undergoing genetic counseling.  This revealed evidence of an EGFR mutation.   The pathologic variant determined was EGFR (c.2369C>T) ([id number]).  There was also a variant of uncertain significance in a single ALK gene (c.640CT) the ALK gene is associated increased neuroblastoma though most variants of uncertain significance are usually reclassified as benign.     A review of available literature indicates that up to 20% of cases of lung cancer are familial in nature with an increased genetic predisposition greater than the general population.  Perhaps 5 to 10% may be hereditary from a pathogenic variant in the single gene that may increase the lifetime risk of lung cancer and places first-degree relatives at a 50% risk of being simply affected unless they have not inherited the pathogenic variant.  The most common gene associated with hereditary susceptibility is EGFR with T790M representing the most common variant.  These may represent up to 1.51% of lung cancer with a greater association in non-smokers, early onset in age, often described more commonly in females and over half of cases reveal a pathogenic variant in EGFR in the lung cancer specimen found in the individual who also has a germline mutation gene.     Reports indicate that individual with a pathogenic variant the risk of lung cancer may be as high as 31% and there were no clear surveillance guidelines for infecting individual  with his pathogenic variant has never had lung cancer.  This patient underwent a screening CAT scan of the chest after assessment by pulmonary medicine July 14, 2020.  This revealed multiple pulmonary nodules within both lung fields some with groundglass density and others more solid in appearance.  The largest was in the left upper lobe measuring 7 mm with no evidence of lymphadenopathy.  There is also a 3 cm left paraspinal cyst at T5 thought to be benign.     It was determined that the patient be admitted for a left robot-assisted wedge resection of the lung nodules as well as the paraspinal cyst biopsy if indicated.  This was performed August 21, 2020 with the patient going bronchoscopy in the left VAT.  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  The patient's case was discussed in thoracic conference September 8, 2020.  Plans were made for her to be seen via medical oncology and determine how we might proceed for therapeutic options and/or surveillance.  She is met with her  and we have discussed her findings over approximately 70 minutes.  This led to baseline studies including a sed rate of 6, CEA of 1.87, LDH of 134, CMP with BUN/creatinine 15 and 0.58 and follow-up baseline examinations of CT of chest abdomen pelvis October 16 demonstrating her postsurgical scarring left upper lobe, stable 0.6 cm noncalcified pulmonary nodule left upper lobe with multiple groundglass opacities/nodules seen in the right upper lobe that appears stable, findings in abdomen pelvis with normal spleen, adrenal glands, kidneys, liver, gallbladder and pancreas, potential IUD extending to the myometrium-possible malposition that will need to be followed up.  The patient is seen October 2016 indicates that she feels relatively well.  She is surprised by the IUD question but sees Dr. Annette Parekh concerning this.  We have discussed follow-up that includes chest x-ray at 6  "months, low-dose screening chest CT likely yearly.  The patient is next seen 2021.  She has undergone a follow-up chest x-ray reveals no acute abnormalities.  She is feeling well with no additional changes in her performance status.  We discussed a follow-up low-dose scan scheduled least yearly.  The IUD issue has been addressed, incidentally, by replacement in the interval.      It was elected to have the patient undergo a low-dose CT scan which is now performed 2021 and which shows numerous bilateral groundglass and mixed density nodules without appreciable change including a mixed density right upper lobe measuring8 millimeters, stable density anterior left upper lobe a 7 mm.  There are postoperative changes from wedge resection left upper lobe with no evidence lymphadenopathy or pleural effusion.      The patient seen 2021 feeling well with no additional respiratory symptoms.  We, additionally, discussed her COVID-19 vaccination and her previous vaccination was at the beginning of .  Considering the abnormalities currently present in her lungs she could be at significant risk for Covid pneumonia complications should she develop the disease.  We have agreed that she will be checked for her antibody studies now.      Past Medical History:   Diagnosis Date   • Allergic rhinitis    • Anemia     as child   • Anxiety    • Encounter for IUD insertion 2020    MIRENA    • H/O bone density study never   • H/O complete eye exam 2008    dr qureshi   • H/O Lung nodules    • History of hepatitis     AS CHILD   • History of multiple pulmonary nodules    • Hypertension    • Hypertension in pregnancy    • PONV (postoperative nausea and vomiting)    • Seasonal allergies    • Vaginal delivery     08 DR TAN BABY \"KYDWYN\" 6.12        Past Surgical History:   Procedure Laterality Date   •  SECTION  2006 AND    • EAR TUBES      Tympanostomy tube placement as a child multiple times "   • THORACOSCOPY Left 8/21/2020    Procedure: BRONCHOSCOPY, LEFT VIDEO ASSISTED THORACOSCOPY WITH DAVINCI ROBOT ASSISTED LEFT UPPER LOBE WEDGE RESECTION, LEFT LOWER LOBE WEDGE RESECTION, RESECTION OF MEDIASTINAL CYST,  INTERCOSTAL NERVE BLOCKS;  Surgeon: Gio Burnett III, MD;  Location: [facility];  Service: [facility];  Laterality: Left;   • TONSILLECTOMY     • TYMPANOPLASTY  1999   • WISDOM TOOTH EXTRACTION  1999        Current Outpatient Medications on File Prior to Visit   Medication Sig Dispense Refill   • busPIRone (BUSPAR) 15 MG tablet Take 1 tablet by mouth 3 (Three) Times a Day. 270 tablet 3   • CALCIUM CITRATE-VITAMIN D3 PO Take 1 tablet by mouth Daily. 1200 MG  HOLD FOR SURGERY     • docusate sodium (Colace) 100 MG capsule      • DULoxetine (CYMBALTA) 60 MG capsule Take 1 capsule by mouth Daily. 90 capsule 3   • hydrocortisone 2.5 % cream APPLY TO RED ITCHY AREA OF FACE TWICE DAILY FOR 3 5 DAYS REPEAT PRN FLARES.     • levonorgestrel (Mirena, 52 MG,) 20 MCG/24HR IUD 1 each by Intrauterine route 1 (One) Time.     • losartan (COZAAR) 100 MG tablet Take 1 tablet by mouth Daily. 90 tablet 3   • metroNIDAZOLE (FLAGYL) 0.75 % lotion lotion APPLY TO FACE EVERY DAY     • Multiple Vitamins-Minerals (MULTIVITAMIN ADULT PO) Take 1 tablet by mouth Daily. HOLD FOR SURGERY     • Omega-3 Fatty Acids (FISH OIL) 1200 MG capsule capsule Take 1,200 mg by mouth Daily. EPA AND 900MG DHA  HOLD FOR SURGERY     • senna (senna) 8.6 MG tablet Take 1 tablet by mouth As Needed for Constipation.     • Turmeric 500 MG capsule Take 3 tablets by mouth Daily. WITH BIOPERINE-TOTAL OF  1950MG  HOLD FOR SURGERY     • [DISCONTINUED] Magnesium 500 MG tablet Take 1 tablet by mouth Daily. HOLD FOR SURGERY     • [DISCONTINUED] tobramycin-dexamethasone (TOBRADEX) 0.3-0.1 % ophthalmic suspension USE 4 DROPS IN THE LEFT EAR TWICE A DAY FOR 7 DAYS       No current facility-administered medications on file prior to visit.        ALLERGIES:   "  Allergies   Allergen Reactions   • Latex Swelling     SKIN REDNESS AND SWELLING        Social History     Socioeconomic History   • Marital status:      Spouse name: Jack Calderón   • Number of children: 2   • Years of education: College   • Highest education level: Not on file   Tobacco Use   • Smoking status: Never Smoker   • Smokeless tobacco: Never Used   Vaping Use   • Vaping Use: Never used   Substance and Sexual Activity   • Alcohol use: Yes     Comment: WEEKLY-WINE OR LIQUOR   • Drug use: Never   • Sexual activity: Defer     Birth control/protection: I.U.D.        Family History   Problem Relation Age of Onset   • ALS Maternal Grandfather    • Hypertension Maternal Grandfather    • Diabetes Maternal Uncle    • Hypertension Maternal Grandmother    • Lung cancer Mother    • HIV Father    • No Known Problems Daughter    • No Known Problems Son    • No Known Problems Paternal Grandmother    • No Known Problems Maternal Aunt    • No Known Problems Paternal Aunt    • No Known Problems Paternal Grandfather    • BRCA 1/2 Neg Hx    • Breast cancer Neg Hx    • Colon cancer Neg Hx    • Endometrial cancer Neg Hx    • Ovarian cancer Neg Hx    • Malig Hyperthermia Neg Hx         Review of Systems   Constitutional: Positive for diaphoresis (night sweats). Negative for fatigue.   Respiratory: Negative for chest tightness, shortness of breath and wheezing.    Gastrointestinal: Positive for constipation. Negative for abdominal pain, diarrhea, nausea and vomiting.   Neurological: Negative for weakness.       Objective     Vitals:    09/28/21 1540   BP: 128/85   Pulse: 83   Resp: 16   Temp: 98.3 °F (36.8 °C)   TempSrc: Temporal   SpO2: 98%   Weight: 67.9 kg (149 lb 12.8 oz)   Height: 156.7 cm (61.69\")   PainSc: 0-No pain     Current Status 9/28/2021   ECOG score 1       Physical Exam  Constitutional:       General: She is not in acute distress.     Appearance: She is normal weight. She is not ill-appearing.   HENT:      " Head: Normocephalic and atraumatic.      Nose: Nose normal.      Mouth/Throat:      Mouth: Mucous membranes are moist.      Pharynx: Oropharynx is clear. No oropharyngeal exudate.   Eyes:      Extraocular Movements: Extraocular movements intact.      Conjunctiva/sclera: Conjunctivae normal.      Pupils: Pupils are equal, round, and reactive to light.   Cardiovascular:      Rate and Rhythm: Normal rate and regular rhythm.      Pulses: Normal pulses.      Heart sounds: No murmur heard.   No gallop.    Pulmonary:      Effort: Pulmonary effort is normal. No respiratory distress.      Breath sounds: Normal breath sounds. No wheezing or rales.   Abdominal:      General: Abdomen is flat. Bowel sounds are normal. There is no distension.      Palpations: Abdomen is soft. There is no mass.      Tenderness: There is no abdominal tenderness. There is no guarding.   Musculoskeletal:         General: Normal range of motion.      Cervical back: Normal range of motion and neck supple.   Skin:     General: Skin is warm and dry.      Findings: No rash.   Neurological:      General: No focal deficit present.      Mental Status: She is alert and oriented to person, place, and time. Mental status is at baseline.   Psychiatric:         Mood and Affect: Mood normal.         Thought Content: Thought content normal.         RECENT LABS:  Hematology WBC   Date Value Ref Range Status   09/28/2021 7.62 3.40 - 10.80 10*3/mm3 Final   05/21/2020 6.42 3.40 - 10.80 10*3/mm3 Final     RBC   Date Value Ref Range Status   09/28/2021 4.62 3.77 - 5.28 10*6/mm3 Final   05/21/2020 4.36 3.77 - 5.28 10*6/mm3 Final     Hemoglobin   Date Value Ref Range Status   09/28/2021 13.8 12.0 - 15.9 g/dL Final     Hematocrit   Date Value Ref Range Status   09/28/2021 40.1 34.0 - 46.6 % Final     Platelets   Date Value Ref Range Status   09/28/2021 366 140 - 450 10*3/mm3 Final      EXAMINATIONS: CT OF THE CHEST  9/24/2021  FINDINGS: Numerous bilateral groundglass and  mixed density nodules are  again noted. These are without appreciable change. The mixed density  nodule in the right upper lobe on image 103 measures about 8 mm, which  is stable. Mixed density nodule in the anterior left upper lobe on image  86 measures about 7 mm which is also stable. Redemonstrated are  postoperative changes from wedge resection in the left upper lobe. No  evidence for lymphadenopathy or pleural effusion. Limited imaging of the  upper abdomen is unremarkable. No acute bony abnormality.     IMPRESSION:  Stable bilateral pulmonary nodules. Continued follow-up recommended          Assessment/Plan     45-year-old female who recently underwent assessment after dense breast tissue was recognized with a strong family history of breast carcinoma.  She underwent genetic counseling revealing, unexpectedly, evidence of an EGFR mutation  The pathologic variant determined was EGFR (c.2369C>T) ([id number]).  There was also a variant of uncertain significance in a single ALK gene (c.640CT).  Her additional family history includes her mother dying from adenocarcinoma of the lung after developing disease in her late 50s with a long history of tobacco smoking.    The patient was reviewed by pulmonary medicine initially with a chest x-ray that revealed potential granulomatous disease though, thereafter, chest CT demonstrated multiple pulmonary nodules several with groundglass density and others with more solid appearance.  Is also a 3 cm left paraspinal cyst thought to be benign.  Patient was referred to thoracic surgery undergoing a left robot-assisted wedge resection as well as removal of the paraspinal cyst August 21.  Final pathology revealed atypical pneumocyte hyperplasia and her case was brought to thoracic conference for assessment.  A follow-up chest CT is planned in mid October and then additional screening in March of next year.  She is seen with her  September 16 initial consultation.                                                                                        Recent review article from 2019 again describes germline mutations  Estimated EGFR with T790M associated with specific lung cancer syndrome targeted never smokers.  The mutations are rare but EGFR is considered to be a major cancer predisposition gene associated with an estimated 31% risk of lung cancer non-smoking carriers the mutation itself appears to be weakly oncogenic when associated with the common activating mutation the potential is enhanced.  73% of patients lung cancer in the germline T7 9 mutation have been found to have a second activity mutation most commonly the L858R mutation.     Such germline mutations are thought to be present in 1% of non-small cell lung cancer cases with a meeting age of diagnosis 40 years, most common histology adenocarcinoma, seemingly more common in [place] rather than [place] and in females and non-smoking status.  The most frequent presentation was bilateral groundglass opacities and pulmonary nodules with an indolent disease course.  Screen him on selected populations has no benefit but the germline mutation is present 50% of patients with baseline T7 90 EGFR mutation in pretreatment evaluation leading to the possibility of routine germline genotyping.     The  INHERIT EGFR study from [facility] investigatived families with this variant with 105 participants.  Germline EGFR mutations were found and 63% of patients with EGFR T790M detected at lung cancer diagnosis and in 62% and 44% of first and second-degree relatives of germline carriers respectively.  The meeting age appears to be 57 years of age and 65% of cases are diagnosed at advanced age suggesting an indolent multifocal nodular phase progressed in a lymph node in the remote metastatic disease.  This is discussed with the patient and her  in in detail when seen September 16, 2020.  There is not any indication to treat her though  certainly a surveillance assessment schedule is reasonable.  She and her  agree and, at this point, and we proceeded to assess with baseline studies and repeat CT scanning.  Fortunately there is no evidence of any additional or progressive disease in this patient.    The patient return for follow-up chest x-ray April 8, 2021 with no acute process.  The patient clinically feels well and, incidentally, had her IUD replaced after her previous discussion here in office concerning findings on her CT scans.  She is feeling well with an excellent performance status we discussed a follow-up yearly low-dose CT scan of chest to which she is agreeable.    The patient follow-up chest CT 9/24/2021 without significant change.  Interview 9/28 we plan to reassess regularly but also determine her COVID-19 antibody level.    Plan:    *Return to laboratory for COVID-19 antibody testing with plans to contact the patient when results become available and discuss additional vaccination as needed      *Chest x-ray 6 months, NP      *Schedule low-dose CT scan in 51 weeks    *52 weeks MD follow-up, cbc

## 2021-09-29 ENCOUNTER — TELEPHONE (OUTPATIENT)
Dept: ONCOLOGY | Facility: CLINIC | Age: 46
End: 2021-09-29

## 2021-09-29 LAB
SARS-COV-2 AB SERPL IA-ACNC: 758.5 U/ML
SARS-COV-2 AB SERPL-IMP: POSITIVE

## 2021-09-29 NOTE — TELEPHONE ENCOUNTER
Called pt and informed her of her positive antibody testing. Per Dr. Belle, she does not require the booster vaccination at this time. She v/u.

## 2022-01-05 ENCOUNTER — TELEPHONE (OUTPATIENT)
Dept: OBSTETRICS AND GYNECOLOGY | Age: 47
End: 2022-01-05

## 2022-01-05 NOTE — TELEPHONE ENCOUNTER
Patient requesting referral for Colonoscopy.   States that during her last visit she would need to set an appointment up this year. States she was hoping to have it completed before her next appointment with you. States that she is trying to get into a study and needs to have the colonoscopy scheduled to do that. Let the patient know that once there referral is place the GI office would reach out to her to schedule the appointment.

## 2022-01-06 ENCOUNTER — PREP FOR SURGERY (OUTPATIENT)
Dept: SURGERY | Facility: SURGERY CENTER | Age: 47
End: 2022-01-06

## 2022-01-06 DIAGNOSIS — Z12.11 COLON CANCER SCREENING: Primary | ICD-10-CM

## 2022-01-06 DIAGNOSIS — Z12.11 SCREEN FOR COLON CANCER: Primary | ICD-10-CM

## 2022-01-10 ENCOUNTER — APPOINTMENT (OUTPATIENT)
Dept: WOMENS IMAGING | Facility: HOSPITAL | Age: 47
End: 2022-01-10

## 2022-01-10 PROCEDURE — 77063 BREAST TOMOSYNTHESIS BI: CPT | Performed by: RADIOLOGY

## 2022-01-10 PROCEDURE — 77067 SCR MAMMO BI INCL CAD: CPT | Performed by: RADIOLOGY

## 2022-01-13 PROBLEM — Z12.11 COLON CANCER SCREENING: Status: ACTIVE | Noted: 2022-01-13

## 2022-01-28 ENCOUNTER — ANESTHESIA EVENT (OUTPATIENT)
Dept: SURGERY | Facility: SURGERY CENTER | Age: 47
End: 2022-01-28

## 2022-01-28 ENCOUNTER — HOSPITAL ENCOUNTER (OUTPATIENT)
Facility: SURGERY CENTER | Age: 47
Setting detail: HOSPITAL OUTPATIENT SURGERY
Discharge: HOME | End: 2022-01-28
Attending: SURGERY | Admitting: SURGERY

## 2022-01-28 ENCOUNTER — ANESTHESIA (OUTPATIENT)
Dept: SURGERY | Facility: SURGERY CENTER | Age: 47
End: 2022-01-28

## 2022-01-28 VITALS
HEART RATE: 75 BPM | TEMPERATURE: 97.6 F | DIASTOLIC BLOOD PRESSURE: 84 MMHG | BODY MASS INDEX: 30.78 KG/M2 | RESPIRATION RATE: 16 BRPM | WEIGHT: 163 LBS | SYSTOLIC BLOOD PRESSURE: 117 MMHG | OXYGEN SATURATION: 100 % | HEIGHT: 61 IN

## 2022-01-28 LAB
B-HCG UR QL: NEGATIVE
EXPIRATION DATE: NORMAL
INTERNAL NEGATIVE CONTROL: NEGATIVE
INTERNAL POSITIVE CONTROL: POSITIVE
Lab: NORMAL

## 2022-01-28 PROCEDURE — 45378 DIAGNOSTIC COLONOSCOPY: CPT | Performed by: SURGERY

## 2022-01-28 PROCEDURE — 81025 URINE PREGNANCY TEST: CPT | Performed by: SURGERY

## 2022-01-28 PROCEDURE — 25010000002 PROPOFOL 10 MG/ML EMULSION: Performed by: ANESTHESIOLOGY

## 2022-01-28 PROCEDURE — S0260 H&P FOR SURGERY: HCPCS | Performed by: SURGERY

## 2022-01-28 PROCEDURE — 0DJD8ZZ INSPECTION OF LOWER INTESTINAL TRACT, VIA NATURAL OR ARTIFICIAL OPENING ENDOSCOPIC: ICD-10-PCS | Performed by: SURGERY

## 2022-01-28 RX ORDER — SODIUM CHLORIDE 0.9 % (FLUSH) 0.9 %
10 SYRINGE (ML) INJECTION AS NEEDED
Status: DISCONTINUED | OUTPATIENT
Start: 2022-01-28 | End: 2022-01-28 | Stop reason: HOSPADM

## 2022-01-28 RX ORDER — LABETALOL HYDROCHLORIDE 5 MG/ML
5 INJECTION, SOLUTION INTRAVENOUS
Status: DISCONTINUED | OUTPATIENT
Start: 2022-01-28 | End: 2022-01-28 | Stop reason: HOSPADM

## 2022-01-28 RX ORDER — ONDANSETRON 2 MG/ML
4 INJECTION INTRAMUSCULAR; INTRAVENOUS ONCE AS NEEDED
Status: DISCONTINUED | OUTPATIENT
Start: 2022-01-28 | End: 2022-01-28 | Stop reason: HOSPADM

## 2022-01-28 RX ORDER — MAGNESIUM HYDROXIDE 1200 MG/15ML
LIQUID ORAL AS NEEDED
Status: DISCONTINUED | OUTPATIENT
Start: 2022-01-28 | End: 2022-01-28 | Stop reason: HOSPADM

## 2022-01-28 RX ORDER — LIDOCAINE HYDROCHLORIDE 10 MG/ML
0.5 INJECTION, SOLUTION INFILTRATION; PERINEURAL ONCE AS NEEDED
Status: DISCONTINUED | OUTPATIENT
Start: 2022-01-28 | End: 2022-01-28 | Stop reason: HOSPADM

## 2022-01-28 RX ORDER — PROPOFOL 10 MG/ML
VIAL (ML) INTRAVENOUS AS NEEDED
Status: DISCONTINUED | OUTPATIENT
Start: 2022-01-28 | End: 2022-01-28 | Stop reason: SURG

## 2022-01-28 RX ORDER — LIDOCAINE HYDROCHLORIDE 20 MG/ML
INJECTION, SOLUTION INFILTRATION; PERINEURAL AS NEEDED
Status: DISCONTINUED | OUTPATIENT
Start: 2022-01-28 | End: 2022-01-28 | Stop reason: SURG

## 2022-01-28 RX ORDER — FENTANYL CITRATE 50 UG/ML
25 INJECTION, SOLUTION INTRAMUSCULAR; INTRAVENOUS
Status: DISCONTINUED | OUTPATIENT
Start: 2022-01-28 | End: 2022-01-28 | Stop reason: HOSPADM

## 2022-01-28 RX ORDER — SODIUM CHLORIDE, SODIUM LACTATE, POTASSIUM CHLORIDE, CALCIUM CHLORIDE 600; 310; 30; 20 MG/100ML; MG/100ML; MG/100ML; MG/100ML
1000 INJECTION, SOLUTION INTRAVENOUS CONTINUOUS
Status: DISCONTINUED | OUTPATIENT
Start: 2022-01-28 | End: 2022-01-28 | Stop reason: HOSPADM

## 2022-01-28 RX ORDER — HYDRALAZINE HYDROCHLORIDE 20 MG/ML
10 INJECTION INTRAMUSCULAR; INTRAVENOUS
Status: DISCONTINUED | OUTPATIENT
Start: 2022-01-28 | End: 2022-01-28 | Stop reason: HOSPADM

## 2022-01-28 RX ADMIN — LIDOCAINE HYDROCHLORIDE 60 MG: 20 INJECTION, SOLUTION INFILTRATION; PERINEURAL at 08:44

## 2022-01-28 RX ADMIN — PROPOFOL 160 MCG/KG/MIN: 10 INJECTION, EMULSION INTRAVENOUS at 08:44

## 2022-01-28 RX ADMIN — SODIUM CHLORIDE, POTASSIUM CHLORIDE, SODIUM LACTATE AND CALCIUM CHLORIDE 1000 ML: 600; 310; 30; 20 INJECTION, SOLUTION INTRAVENOUS at 08:37

## 2022-01-28 RX ADMIN — PROPOFOL 120 MG: 10 INJECTION, EMULSION INTRAVENOUS at 08:44

## 2022-01-28 NOTE — ANESTHESIA POSTPROCEDURE EVALUATION
"Patient: Roxy Calvert    Procedure Summary     Date: 01/28/22 Room / Location: [facility]    Anesthesia Start: 0839 Anesthesia Stop: 0908    Procedure: COLONOSCOPY (N/A ) Diagnosis:       Colon cancer screening      (Colon cancer screening [Z12.11])    Surgeons: Laly Banegas MD Provider: Karthikeyan Sanchez MD    Anesthesia Type: MAC ASA Status: 2          Anesthesia Type: MAC    Vitals  Vitals Value Taken Time   /72 01/28/22 0908   Temp 36.4 °C (97.6 °F) 01/28/22 0908   Pulse 75 01/28/22 0908   Resp 16 01/28/22 0908   SpO2 100 % 01/28/22 0908           Post Anesthesia Care and Evaluation    Patient location during evaluation: bedside  Patient participation: complete - patient participated  Level of consciousness: sleepy but conscious  Pain management: adequate  Airway patency: patent  Anesthetic complications: No anesthetic complications    Cardiovascular status: acceptable  Respiratory status: acceptable  Hydration status: acceptable    Comments: */72 (BP Location: Left arm, Patient Position: Lying)   Pulse 75   Temp 36.4 °C (97.6 °F) (Infrared)   Resp 16   Ht 154.9 cm (61\")   Wt 73.9 kg (163 lb)   SpO2 100%   Breastfeeding No Comment: IUD  BMI 30.80 kg/m²         "

## 2022-01-28 NOTE — ANESTHESIA PREPROCEDURE EVALUATION
Anesthesia Evaluation     history of anesthetic complications: PONV  NPO Solid Status: > 8 hours  NPO Liquid Status: > 2 hours           Airway   Mallampati: III  Small opening and Possible difficult intubation  Dental - normal exam     Pulmonary - negative pulmonary ROS and normal exam   (-) COPD, asthma, sleep apnea, not a smoker    ROS comment: Pulmonary nodules  PE comment: nonlabored  Cardiovascular - normal exam    Rhythm: regular  Rate: normal    (+) hypertension,   (-) valvular problems/murmurs, past MI, CAD, dysrhythmias, angina      Neuro/Psych  (+) psychiatric history Anxiety,     (-) seizures, TIA, CVA  GI/Hepatic/Renal/Endo    (+)   hepatitis (in childhood--presumably Hep A),   (-) GERD, liver disease, no renal disease, diabetes, no thyroid disorder    Musculoskeletal (-) negative ROS    Abdominal    Substance History      OB/GYN          Other   blood dyscrasia (anemia in childhood) anemia,       Other Comment: Monoallelic mutation of EGFR gene                Anesthesia Plan    ASA 2     MAC       Anesthetic plan, all risks, benefits, and alternatives have been provided, discussed and informed consent has been obtained with: patient.        CODE STATUS:

## 2022-02-10 ENCOUNTER — OFFICE VISIT (OUTPATIENT)
Dept: OBSTETRICS AND GYNECOLOGY | Age: 47
End: 2022-02-10

## 2022-02-10 VITALS
BODY MASS INDEX: 30.36 KG/M2 | HEIGHT: 61 IN | DIASTOLIC BLOOD PRESSURE: 80 MMHG | SYSTOLIC BLOOD PRESSURE: 122 MMHG | WEIGHT: 160.8 LBS

## 2022-02-10 DIAGNOSIS — Z11.51 SCREENING FOR HPV (HUMAN PAPILLOMAVIRUS): ICD-10-CM

## 2022-02-10 DIAGNOSIS — Z01.419 ENCOUNTER FOR GYNECOLOGICAL EXAMINATION WITHOUT ABNORMAL FINDING: Primary | ICD-10-CM

## 2022-02-10 DIAGNOSIS — Z12.4 SCREENING FOR CERVICAL CANCER: ICD-10-CM

## 2022-02-10 PROCEDURE — 99396 PREV VISIT EST AGE 40-64: CPT | Performed by: OBSTETRICS & GYNECOLOGY

## 2022-02-10 RX ORDER — BROMPHENIRAMINE MALEATE, PSEUDOEPHEDRINE HYDROCHLORIDE, AND DEXTROMETHORPHAN HYDROBROMIDE 2; 30; 10 MG/5ML; MG/5ML; MG/5ML
SYRUP ORAL
COMMUNITY
Start: 2022-01-29 | End: 2022-03-25 | Stop reason: SDDI

## 2022-02-10 NOTE — PROGRESS NOTES
"Routine Annual Visit    2/10/2022    Patient: Roxy Calvert          MR#:[id number]      Chief Complaint   Patient presents with   • Gynecologic Exam     Last Pap 19 (-), Last Mammo 1/10/22, last C/scope 22, No complaints at this time       History of Present Illness    46 y.o. female  who presents for annual exam.  Pap due  UTD mammo and CSC  mirena IUD  No issues           No LMP recorded. Patient has had an implant.  Obstetric History:  OB History        4    Para   3    Term   2       1    AB        Living   2       SAB        IAB        Ectopic        Molar        Multiple        Live Births   2               Menstrual History:     No LMP recorded. Patient has had an implant.       Sexual History:       ________________________________________  Patient Active Problem List   Diagnosis   • Hypertension   • Anxiety   • Pulmonary nodules/lesions, multiple   • Lung nodules   • Monoallelic mutation of EGFR gene   • Colon cancer screening       Past Medical History:   Diagnosis Date   • Allergic rhinitis    • Anemia     as child   • Anxiety    • Encounter for IUD insertion 2020    MIRENA    • H/O bone density study never   • H/O complete eye exam 2008    dr qureshi   • H/O Lung nodules    • History of hepatitis     AS CHILD   • History of multiple pulmonary nodules    • Hypertension    • Hypertension in pregnancy    • PONV (postoperative nausea and vomiting)    • Seasonal allergies    • Vaginal delivery     08 DR TAN BABY \"KYDWYN\" 6.12       Past Surgical History:   Procedure Laterality Date   •  SECTION  2006 AND    • COLONOSCOPY N/A 2022    Procedure: COLONOSCOPY;  Surgeon: Laly Banegas MD;  [place] [facility] [place];  Service: Gastroenterology;  Laterality: N/A;  normal   • EAR TUBES      Tympanostomy tube placement as a child multiple times   • THORACOSCOPY Left 2020    Procedure: BRONCHOSCOPY, LEFT VIDEO ASSISTED THORACOSCOPY WITH " "DAVINCI ROBOT ASSISTED LEFT UPPER LOBE WEDGE RESECTION, LEFT LOWER LOBE WEDGE RESECTION, RESECTION OF MEDIASTINAL CYST,  INTERCOSTAL NERVE BLOCKS;  Surgeon: Gio Burnett III, MD;  Location: [facility];  Service: [facility];  Laterality: Left;   • TONSILLECTOMY     • TYMPANOPLASTY  1999   • WISDOM TOOTH EXTRACTION  1999       Social History     Tobacco Use   Smoking Status Never Smoker   Smokeless Tobacco Never Used       has a current medication list which includes the following prescription(s): brompheniramine-pseudoephedrine-dm, buspirone, calcium citrate-vitamin d, docusate sodium, duloxetine, hydrocortisone, mirena (52 mg), losartan, metronidazole, multiple vitamin, fish oil, senna, and turmeric.  ________________________________________    Current contraception: IUD  History of abnormal Pap smear: no  Family history of Breast cancer: yes  Family history of uterine or ovarian cancer: no  Family History of colon cancer/colon polyps: no  History of abnormal mammogram: no      The following portions of the patient's history were reviewed and updated as appropriate: allergies, current medications, past family history, past medical history, past social history, past surgical history and problem list.    Review of Systems    Pertinent items are noted in HPI.     Objective   Physical Exam    /80   Ht 154.9 cm (61\")   Wt 72.9 kg (160 lb 12.8 oz)   Breastfeeding No   BMI 30.38 kg/m²    BP Readings from Last 3 Encounters:   02/10/22 122/80   01/28/22 117/84   09/28/21 128/85      Wt Readings from Last 3 Encounters:   02/10/22 72.9 kg (160 lb 12.8 oz)   01/28/22 73.9 kg (163 lb)   09/28/21 67.9 kg (149 lb 12.8 oz)      BMI: Estimated body mass index is 30.38 kg/m² as calculated from the following:    Height as of this encounter: 154.9 cm (61\").    Weight as of this encounter: 72.9 kg (160 lb 12.8 oz).      General:   alert, appears stated age and cooperative   Abdomen: soft, non-tender, without masses or " organomegaly   Breast: inspection negative, no nipple discharge or bleeding, no masses or nodularity palpable   Vulva: normal   Vagina: normal mucosa   Cervix: no cervical motion tenderness and no lesions   Uterus: normal size, mobile and non-tender   Adnexa: no mass, fullness, tenderness     Assessment:    1. Normal annual exam   Assessment     ICD-10-CM ICD-9-CM   1. Encounter for gynecological examination without abnormal finding  Z01.419 V72.31   2. Screening for cervical cancer  Z12.4 V76.2   3. Screening for HPV (human papillomavirus)  Z11.51 V73.81     Plan:    Plan     []  Mammogram request made  [x]  PAP done  []  Labs:   []  GC/Chl/TV  []  DEXA scan   []  Referral for colonoscopy:       Diagnoses and all orders for this visit:    1. Encounter for gynecological examination without abnormal finding (Primary)    2. Screening for cervical cancer  -     IGP, Apt HPV,rfx 16 / 18,45    3. Screening for HPV (human papillomavirus)  -     IGP, Apt HPV,rfx 16 / 18,45            Counseling:  --Nutrition: Stressed importance of moderation and caloric balance, stressed fresh fruit and vegetables  --Exercise: Stressed the importance of regular exercise. 3-5 times weekly   - Discussed screening mammogram recommendations.   --Discussed benefits of screening colonoscopy- age 45 unless FH  --Discussed pap smear screening recommendations

## 2022-02-15 LAB
CYTOLOGIST CVX/VAG CYTO: NORMAL
CYTOLOGY CVX/VAG DOC CYTO: NORMAL
CYTOLOGY CVX/VAG DOC THIN PREP: NORMAL
DX ICD CODE: NORMAL
HIV 1 & 2 AB SER-IMP: NORMAL
HPV I/H RISK 4 DNA CVX QL PROBE+SIG AMP: NEGATIVE
OTHER STN SPEC: NORMAL
STAT OF ADQ CVX/VAG CYTO-IMP: NORMAL

## 2022-03-19 ENCOUNTER — HOSPITAL ENCOUNTER (OUTPATIENT)
Dept: GENERAL RADIOLOGY | Facility: HOSPITAL | Age: 47
Discharge: HOME | End: 2022-03-19
Admitting: INTERNAL MEDICINE

## 2022-03-19 DIAGNOSIS — R91.8 PULMONARY NODULES/LESIONS, MULTIPLE: ICD-10-CM

## 2022-03-19 PROCEDURE — 71046 X-RAY EXAM CHEST 2 VIEWS: CPT

## 2022-03-22 DIAGNOSIS — R91.8 PULMONARY NODULES/LESIONS, MULTIPLE: Primary | ICD-10-CM

## 2022-03-25 ENCOUNTER — LAB (OUTPATIENT)
Dept: OTHER | Facility: HOSPITAL | Age: 47
End: 2022-03-25

## 2022-03-25 ENCOUNTER — OFFICE VISIT (OUTPATIENT)
Dept: ONCOLOGY | Facility: CLINIC | Age: 47
End: 2022-03-25

## 2022-03-25 VITALS
WEIGHT: 170.3 LBS | SYSTOLIC BLOOD PRESSURE: 142 MMHG | TEMPERATURE: 97.7 F | RESPIRATION RATE: 16 BRPM | DIASTOLIC BLOOD PRESSURE: 87 MMHG | HEIGHT: 61 IN | OXYGEN SATURATION: 97 % | HEART RATE: 85 BPM | BODY MASS INDEX: 32.15 KG/M2

## 2022-03-25 DIAGNOSIS — R91.8 PULMONARY NODULES/LESIONS, MULTIPLE: Primary | ICD-10-CM

## 2022-03-25 DIAGNOSIS — Z15.89 MONOALLELIC MUTATION OF EGFR GENE: ICD-10-CM

## 2022-03-25 DIAGNOSIS — Z15.09 MONOALLELIC MUTATION OF EGFR GENE: ICD-10-CM

## 2022-03-25 DIAGNOSIS — R91.8 PULMONARY NODULES/LESIONS, MULTIPLE: ICD-10-CM

## 2022-03-25 LAB
ALBUMIN SERPL-MCNC: 4.6 G/DL (ref 3.5–5.2)
ALBUMIN/GLOB SERPL: 1.8 G/DL
ALP SERPL-CCNC: 64 U/L (ref 39–117)
ALT SERPL W P-5'-P-CCNC: 13 U/L (ref 1–33)
ANION GAP SERPL CALCULATED.3IONS-SCNC: 10.6 MMOL/L (ref 5–15)
AST SERPL-CCNC: 15 U/L (ref 1–32)
BASOPHILS # BLD AUTO: 0.07 10*3/MM3 (ref 0–0.2)
BASOPHILS NFR BLD AUTO: 1 % (ref 0–1.5)
BILIRUB SERPL-MCNC: 0.4 MG/DL (ref 0–1.2)
BUN SERPL-MCNC: 7 MG/DL (ref 6–20)
BUN/CREAT SERPL: 11.5 (ref 7–25)
CALCIUM SPEC-SCNC: 9.4 MG/DL (ref 8.6–10.5)
CHLORIDE SERPL-SCNC: 103 MMOL/L (ref 98–107)
CO2 SERPL-SCNC: 25.4 MMOL/L (ref 22–29)
CREAT SERPL-MCNC: 0.61 MG/DL (ref 0.57–1)
DEPRECATED RDW RBC AUTO: 41.4 FL (ref 37–54)
EGFRCR SERPLBLD CKD-EPI 2021: 112.5 ML/MIN/1.73
EOSINOPHIL # BLD AUTO: 0.15 10*3/MM3 (ref 0–0.4)
EOSINOPHIL NFR BLD AUTO: 2.2 % (ref 0.3–6.2)
ERYTHROCYTE [DISTWIDTH] IN BLOOD BY AUTOMATED COUNT: 12.7 % (ref 12.3–15.4)
GLOBULIN UR ELPH-MCNC: 2.6 GM/DL
GLUCOSE SERPL-MCNC: 107 MG/DL (ref 65–99)
HCT VFR BLD AUTO: 40.6 % (ref 34–46.6)
HGB BLD-MCNC: 13.5 G/DL (ref 12–15.9)
IMM GRANULOCYTES # BLD AUTO: 0.04 10*3/MM3 (ref 0–0.05)
IMM GRANULOCYTES NFR BLD AUTO: 0.6 % (ref 0–0.5)
LYMPHOCYTES # BLD AUTO: 2.58 10*3/MM3 (ref 0.7–3.1)
LYMPHOCYTES NFR BLD AUTO: 37.4 % (ref 19.6–45.3)
MCH RBC QN AUTO: 29.5 PG (ref 26.6–33)
MCHC RBC AUTO-ENTMCNC: 33.3 G/DL (ref 31.5–35.7)
MCV RBC AUTO: 88.8 FL (ref 79–97)
MONOCYTES # BLD AUTO: 0.52 10*3/MM3 (ref 0.1–0.9)
MONOCYTES NFR BLD AUTO: 7.5 % (ref 5–12)
NEUTROPHILS NFR BLD AUTO: 3.53 10*3/MM3 (ref 1.7–7)
NEUTROPHILS NFR BLD AUTO: 51.3 % (ref 42.7–76)
NRBC BLD AUTO-RTO: 0 /100 WBC (ref 0–0.2)
PLATELET # BLD AUTO: 359 10*3/MM3 (ref 140–450)
PMV BLD AUTO: 8.6 FL (ref 6–12)
POTASSIUM SERPL-SCNC: 4.3 MMOL/L (ref 3.5–5.2)
PROT SERPL-MCNC: 7.2 G/DL (ref 6–8.5)
RBC # BLD AUTO: 4.57 10*6/MM3 (ref 3.77–5.28)
SODIUM SERPL-SCNC: 139 MMOL/L (ref 136–145)
WBC NRBC COR # BLD: 6.89 10*3/MM3 (ref 3.4–10.8)

## 2022-03-25 PROCEDURE — 99213 OFFICE O/P EST LOW 20 MIN: CPT | Performed by: NURSE PRACTITIONER

## 2022-03-25 PROCEDURE — 36415 COLL VENOUS BLD VENIPUNCTURE: CPT

## 2022-03-25 PROCEDURE — 80053 COMPREHEN METABOLIC PANEL: CPT | Performed by: INTERNAL MEDICINE

## 2022-03-25 PROCEDURE — 85025 COMPLETE CBC W/AUTO DIFF WBC: CPT | Performed by: INTERNAL MEDICINE

## 2022-03-25 RX ORDER — ZINC GLUCONATE 50 MG
TABLET ORAL
COMMUNITY
End: 2023-02-14

## 2022-03-25 RX ORDER — ASPIRIN 81 MG/1
TABLET ORAL
COMMUNITY
End: 2023-02-14

## 2022-03-25 RX ORDER — MULTIVIT WITH MINERALS/LUTEIN
TABLET ORAL
COMMUNITY

## 2022-03-25 RX ORDER — CHOLECALCIFEROL (VITAMIN D3) 25 MCG
TABLET,CHEWABLE ORAL
COMMUNITY
End: 2023-02-14

## 2022-03-25 NOTE — PROGRESS NOTES
Subjective      REASON FOR FOLLOW UP: Germline EGFR mutation        REQUESTING PHYSICIAN: Jack Velázquez MD, Gio Burnett MD    HISTORY OF PRESENT ILLNESS:   The patient is a 46 y.o. female with above-mentioned street, who returns the office today for 6-month follow-up and chest x-ray review.  She overall continues to feel very well.  She did develop COVID-19 January 2022 and has a small residual cough.  She otherwise denies pain with inspiration.  She is eating and drinking adequately.  She denies any new pain.  She has no lingering shortness of breath, fevers or chills.      ONCOLOGY HISTORY:     The patient is a 44-year-old female non-smoker though with a significant secondhand smoke exposure as well is a family history with her mother dying from adenocarcinoma the lung.  There is, additionally a strong family history of breast carcinoma and the patient was assessed undergoing genetic counseling.  This revealed evidence of an EGFR mutation.   The pathologic variant determined was EGFR (c.2369C>T) (p.[id number]).  There was also a variant of uncertain significance in a single ALK gene (c.640CT) the ALK gene is associated increased neuroblastoma though most variants of uncertain significance are usually reclassified as benign.     A review of available literature indicates that up to 20% of cases of lung cancer are familial in nature with an increased genetic predisposition greater than the general population.  Perhaps 5 to 10% may be hereditary from a pathogenic variant in the single gene that may increase the lifetime risk of lung cancer and places first-degree relatives at a 50% risk of being simply affected unless they have not inherited the pathogenic variant.  The most common gene associated with hereditary susceptibility is EGFR with T790M representing the most common variant.  These may represent up to 1.51% of lung cancer with a greater association in non-smokers, early onset in age, often described  more commonly in females and over half of cases reveal a pathogenic variant in EGFR in the lung cancer specimen found in the individual who also has a germline mutation gene.     Reports indicate that individual with a pathogenic variant the risk of lung cancer may be as high as 31% and there were no clear surveillance guidelines for infecting individual with his pathogenic variant has never had lung cancer.  This patient underwent a screening CAT scan of the chest after assessment by pulmonary medicine July 14, 2020.  This revealed multiple pulmonary nodules within both lung fields some with groundglass density and others more solid in appearance.  The largest was in the left upper lobe measuring 7 mm with no evidence of lymphadenopathy.  There is also a 3 cm left paraspinal cyst at T5 thought to be benign.     It was determined that the patient be admitted for a left robot-assisted wedge resection of the lung nodules as well as the paraspinal cyst biopsy if indicated.  This was performed August 21, 2020 with the patient going bronchoscopy in the left VAT.  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  The patient's case was discussed in thoracic conference September 8, 2020.  Plans were made for her to be seen via medical oncology and determine how we might proceed for therapeutic options and/or surveillance.  She is met with her  and we have discussed her findings over approximately 70 minutes.  This led to baseline studies including a sed rate of 6, CEA of 1.87, LDH of 134, CMP with BUN/creatinine 15 and 0.58 and follow-up baseline examinations of CT of chest abdomen pelvis October 16 demonstrating her postsurgical scarring left upper lobe, stable 0.6 cm noncalcified pulmonary nodule left upper lobe with multiple groundglass opacities/nodules seen in the right upper lobe that appears stable, findings in abdomen pelvis with normal spleen, adrenal glands,  kidneys, liver, gallbladder and pancreas, potential IUD extending to the myometrium-possible malposition that will need to be followed up.  The patient is seen October 2016 indicates that she feels relatively well.  She is surprised by the IUD question but sees Dr. Annette Parekh concerning this.  We have discussed follow-up that includes chest x-ray at 6 months, low-dose screening chest CT likely yearly.  The patient is next seen April 16, 2021.  She has undergone a follow-up chest x-ray reveals no acute abnormalities.  She is feeling well with no additional changes in her performance status.  We discussed a follow-up low-dose scan scheduled least yearly.  The IUD issue has been addressed, incidentally, by replacement in the interval.      It was elected to have the patient undergo a low-dose CT scan which is now performed 9/24/2021 and which shows numerous bilateral groundglass and mixed density nodules without appreciable change including a mixed density right upper lobe measuring8 millimeters, stable density anterior left upper lobe a 7 mm.  There are postoperative changes from wedge resection left upper lobe with no evidence lymphadenopathy or pleural effusion.      The patient seen 9/28/2021 feeling well with no additional respiratory symptoms.  We, additionally, discussed her COVID-19 vaccination and her previous vaccination was at the beginning of 2021.  Considering the abnormalities currently present in her lungs she could be at significant risk for Covid pneumonia complications should she develop the disease.  We have agreed that she will be checked for her antibody studies now.      Past Medical History:   Diagnosis Date   • Allergic rhinitis    • Anemia     as child   • Anxiety    • Encounter for IUD insertion 11/2020    MIRENA    • H/O bone density study never   • H/O complete eye exam 02/2008    dr qureshi   • H/O Lung nodules    • History of hepatitis     AS CHILD   • History of multiple pulmonary  "nodules    • Hypertension    • Hypertension in pregnancy    • PONV (postoperative nausea and vomiting)    • Seasonal allergies    • Vaginal delivery     08 DR TAN BABY \"KYDWYN\" 6.12        Past Surgical History:   Procedure Laterality Date   •  SECTION  2006 AND    • COLONOSCOPY N/A 2022    Procedure: COLONOSCOPY;  Surgeon: Laly Banegas MD;  Location: [facility] [place];  Service: Gastroenterology;  Laterality: N/A;  normal   • EAR TUBES      Tympanostomy tube placement as a child multiple times   • THORACOSCOPY Left 2020    Procedure: BRONCHOSCOPY, LEFT VIDEO ASSISTED THORACOSCOPY WITH DAVINCI ROBOT ASSISTED LEFT UPPER LOBE WEDGE RESECTION, LEFT LOWER LOBE WEDGE RESECTION, RESECTION OF MEDIASTINAL CYST,  INTERCOSTAL NERVE BLOCKS;  Surgeon: Gio Burnett III, MD;  Location: [facility] MAIN OR;  Service: [facility];  Laterality: Left;   • TONSILLECTOMY     • TYMPANOPLASTY     • WISDOM TOOTH EXTRACTION          Current Outpatient Medications on File Prior to Visit   Medication Sig Dispense Refill   • Acetylcysteine, Nutrient, 600 MG tablet      • ascorbic acid (VITAMIN C) 1000 MG tablet      • aspirin 81 MG EC tablet      • busPIRone (BUSPAR) 15 MG tablet Take 1 tablet by mouth 3 (Three) Times a Day. 270 tablet 3   • CALCIUM CITRATE-VITAMIN D3 PO Take 1 tablet by mouth Daily. 1200 MG  HOLD FOR SURGERY     • Cyanocobalamin (B-12) 1000 MCG capsule      • DULoxetine (CYMBALTA) 60 MG capsule Take 1 capsule by mouth Daily. 90 capsule 3   • hydrocortisone 2.5 % cream      • levonorgestrel (Mirena, 52 MG,) 20 MCG/24HR IUD 1 each by Intrauterine route 1 (One) Time.     • losartan (COZAAR) 100 MG tablet Take 1 tablet by mouth Daily. 90 tablet 3   • Multiple Vitamins-Minerals (MULTIVITAMIN ADULT PO) Take 1 tablet by mouth Daily. HOLD FOR SURGERY     • Omega-3 Fatty Acids (FISH OIL) 1200 MG capsule capsule Take 1,200 mg by mouth Daily. EPA AND 900MG DHA  HOLD FOR SURGERY     • Turmeric " 500 MG capsule Take 3 tablets by mouth Daily. WITH BIOPERINE-TOTAL OF  1950MG  HOLD FOR SURGERY     • Zinc 50 MG tablet      • [DISCONTINUED] brompheniramine-pseudoephedrine-DM 30-2-10 MG/5ML syrup TAKE 10 MILLILITERS BY MOUTH EVERY 4 HOURS AS NEEDED FOR COUGH     • [DISCONTINUED] docusate sodium (Colace) 100 MG capsule      • [DISCONTINUED] metroNIDAZOLE (FLAGYL) 0.75 % lotion lotion APPLY TO FACE EVERY DAY     • [DISCONTINUED] senna (senna) 8.6 MG tablet Take 1 tablet by mouth As Needed for Constipation.       No current facility-administered medications on file prior to visit.        ALLERGIES:    Allergies   Allergen Reactions   • Latex Swelling     SKIN REDNESS AND SWELLING        Social History     Socioeconomic History   • Marital status:      Spouse name: Jack Calderón   • Number of children: 2   • Years of education: College   Tobacco Use   • Smoking status: Never Smoker   • Smokeless tobacco: Never Used   Vaping Use   • Vaping Use: Never used   Substance and Sexual Activity   • Alcohol use: Yes     Comment: WEEKLY-WINE OR LIQUOR   • Drug use: Never   • Sexual activity: Defer     Birth control/protection: I.U.D.        Family History   Problem Relation Age of Onset   • ALS Maternal Grandfather    • Hypertension Maternal Grandfather    • Diabetes Maternal Uncle    • Hypertension Maternal Grandmother    • Lung cancer Mother    • HIV Father    • No Known Problems Daughter    • No Known Problems Son    • No Known Problems Paternal Grandmother    • No Known Problems Maternal Aunt    • No Known Problems Paternal Aunt    • No Known Problems Paternal Grandfather    • BRCA 1/2 Neg Hx    • Breast cancer Neg Hx    • Colon cancer Neg Hx    • Endometrial cancer Neg Hx    • Ovarian cancer Neg Hx    • Malig Hyperthermia Neg Hx         Review of Systems   ROS as per HPI    Objective     Vitals:    03/25/22 1007   BP: 142/87   Pulse: 85   Resp: 16   Temp: 97.7 °F (36.5 °C)   TempSrc: Temporal   SpO2: 97%   Weight: 77.2  "kg (170 lb 4.8 oz)   Height: 154.9 cm (60.98\")   PainSc: 0-No pain     Current Status 9/28/2021   ECOG score 1       Physical Exam  Constitutional:       General: She is not in acute distress.     Appearance: She is normal weight. She is not ill-appearing.   HENT:      Head: Normocephalic and atraumatic.   Eyes:      Extraocular Movements: Extraocular movements intact.      Conjunctiva/sclera: Conjunctivae normal.      Pupils: Pupils are equal, round, and reactive to light.   Cardiovascular:      Rate and Rhythm: Normal rate and regular rhythm.      Pulses: Normal pulses.      Heart sounds: No murmur heard.    No gallop.   Pulmonary:      Effort: Pulmonary effort is normal. No respiratory distress.      Breath sounds: Normal breath sounds. No wheezing or rales.   Abdominal:      General: Abdomen is flat. Bowel sounds are normal. There is no distension.      Palpations: Abdomen is soft. There is no mass.      Tenderness: There is no abdominal tenderness. There is no guarding.   Musculoskeletal:         General: Normal range of motion.      Cervical back: Normal range of motion and neck supple.   Skin:     General: Skin is warm and dry.      Findings: No rash.   Neurological:      General: No focal deficit present.      Mental Status: She is alert and oriented to person, place, and time. Mental status is at baseline.   Psychiatric:         Mood and Affect: Mood normal.         Thought Content: Thought content normal.     I have reexamined the patient and the results are consistent with the previously documented exam. MAGDA Lowry      RECENT LABS:    Results from last 7 days   Lab Units 03/25/22  1002   WBC 10*3/mm3 6.89   NEUTROS ABS 10*3/mm3 3.53   HEMOGLOBIN g/dL 13.5   HEMATOCRIT % 40.6   PLATELETS 10*3/mm3 359     Results from last 7 days   Lab Units 03/25/22  1002   SODIUM mmol/L 139   POTASSIUM mmol/L 4.3   CHLORIDE mmol/L 103   CO2 mmol/L 25.4   BUN mg/dL 7   CREATININE mg/dL 0.61   CALCIUM " mg/dL 9.4   ALBUMIN g/dL 4.60   BILIRUBIN mg/dL 0.4   ALK PHOS U/L 64   ALT (SGPT) U/L 13   AST (SGOT) U/L 15   GLUCOSE mg/dL 107*           XR Chest PA & Lateral (03/19/2022 16:33)      Assessment/Plan         1.  Lung nodules, EGFR mutation  · Genetic evaluation because of strong family history of breast carcinoma.  Family history also includes maternal adenocarcinoma of the lung  · The pathologic variant determined was EGFR (c.2369C>T) (p.[id number]).  There was also a variant of uncertain significance in a single ALK gene (c.640CT).  · Reviewed by pulmonary medicine initially with a chest x-ray that revealed potential granulomatous disease though, thereafter, chest CT demonstrated multiple pulmonary nodules several with groundglass density and others with more solid appearance.  Is also a 3 cm left paraspinal cyst thought to be benign.    · Patient was referred to thoracic surgery undergoing a left robot-assisted wedge resection as well as removal of the paraspinal cyst August 21.    · Final pathology revealed atypical pneumocyte hyperplasia and her case was brought to thoracic conference for assessment.   · She continues to have annual CT of the chest, alternated with chest x-ray.  · CT of the chest 9/24/2021 with stable bilateral pulmonary nodules  · Chest x-ray 3/19/2022 as outlined above without acute abnormalities.  Previously visualized groundglass opacities were not visualized on plain film             Recent article from 2019 again describes germline mutations. Estimated EGFR with T790M associated with specific lung cancer syndrome targeted never smokers.  The mutations are rare but EGFR is considered to be a major cancer predisposition gene associated with an estimated 31% risk of lung cancer non-smoking carriers the mutation itself appears to be weakly oncogenic when associated with the common activating mutation the potential is enhanced.  73% of patients lung cancer in the germline T7 9 mutation have  been found to have a second activity mutation most commonly the L858R mutation.        Such germline mutations are thought to be present in 1% of non-small cell lung cancer cases with a meeting age of diagnosis 40 years, most common histology adenocarcinoma, seemingly more common in [place] rather than [place] and in females and non-smoking status.  The most frequent presentation was bilateral groundglass opacities and pulmonary nodules with an indolent disease course.  Screen him on selected populations has no benefit but the germline mutation is present 50% of patients with baseline T7 90 EGFR mutation in pretreatment evaluation leading to the possibility of routine germline genotyping.      The  INHERIT EGFR study from [facility] investigatived families with this variant with 105 participants.  Germline EGFR mutations were found and 63% of patients with EGFR T790M detected at lung cancer diagnosis and in 62% and 44% of first and second-degree relatives of germline carriers respectively.  The meeting age appears to be 57 years of age and 65% of cases are diagnosed at advanced age suggesting an indolent multifocal nodular phase progressed in a lymph node in the remote metastatic disease.      2.  COVID-19  · Covid antibody testing 9/28/2021 with appropriate antibodies following vaccination  · The patient did test positive for COVID-19 January 2022  · As reviewed today, the patient has vaginal antibodies for approximately 90 days from positive test, no booster is recommended at this time    Plan:  1. The patient remains on surveillance with annual chest CT.  Chest x-ray was reviewed with the patient today with no acute abnormalities  2. We discussed today there is no need for COVID-19 booster given natural antibodies to COVID-19 infection January 2022  3. In 6 months, the patient will follow up with Dr. Belle with CBC  4. 1 week prior to MD follow-up, patient will undergo low-dose CT scan  5. She understands to  call with any questions or concerns    Heike Gould, APRSUNNY  03/25/2022

## 2022-08-17 ENCOUNTER — TELEMEDICINE (OUTPATIENT)
Dept: FAMILY MEDICINE CLINIC | Facility: CLINIC | Age: 47
End: 2022-08-17

## 2022-08-17 DIAGNOSIS — I10 ESSENTIAL HYPERTENSION: Chronic | ICD-10-CM

## 2022-08-17 PROCEDURE — 99213 OFFICE O/P EST LOW 20 MIN: CPT | Performed by: FAMILY MEDICINE

## 2022-08-17 RX ORDER — DULOXETIN HYDROCHLORIDE 20 MG/1
CAPSULE, DELAYED RELEASE ORAL
COMMUNITY
Start: 2022-06-17 | End: 2023-02-14

## 2022-08-17 RX ORDER — BUSPIRONE HYDROCHLORIDE 15 MG/1
15 TABLET ORAL 3 TIMES DAILY
Qty: 270 TABLET | Refills: 3 | Status: CANCELLED | OUTPATIENT
Start: 2022-08-17

## 2022-08-17 RX ORDER — LOSARTAN POTASSIUM 100 MG/1
100 TABLET ORAL DAILY
Qty: 90 TABLET | Refills: 3 | OUTPATIENT
Start: 2022-08-17

## 2022-08-17 RX ORDER — LOSARTAN POTASSIUM 100 MG/1
100 TABLET ORAL DAILY
Qty: 90 TABLET | Refills: 3 | Status: SHIPPED | OUTPATIENT
Start: 2022-08-17

## 2022-08-17 NOTE — PROGRESS NOTES
Subjective   Roxy Calvert is a 46 y.o. female.     CC: Video Visit for Medical Management    History of Present Illness     Chief Complaint:   Chief Complaint   Patient presents with   • Hypertension     Video visit / med refill        Roxy Calvert 46 y.o. female who presents today for Medical Management of the below listed issues. She  has a problem list of   Patient Active Problem List   Diagnosis   • Hypertension   • Anxiety   • Pulmonary nodules/lesions, multiple   • Lung nodules   • Monoallelic mutation of EGFR gene   • Colon cancer screening   .  Since the last visit, She has overall felt well.  she has been compliant with   Current Outpatient Medications:   •  busPIRone (BUSPAR) 15 MG tablet, Take 1 tablet by mouth 3 (Three) Times a Day., Disp: 270 tablet, Rfl: 3  •  losartan (COZAAR) 100 MG tablet, Take 1 tablet by mouth Daily., Disp: 90 tablet, Rfl: 3  •  Acetylcysteine, Nutrient, 600 MG tablet, , Disp: , Rfl:   •  ascorbic acid (VITAMIN C) 1000 MG tablet, , Disp: , Rfl:   •  aspirin 81 MG EC tablet, , Disp: , Rfl:   •  CALCIUM CITRATE-VITAMIN D3 PO, Take 1 tablet by mouth Daily. 1200 MG HOLD FOR SURGERY, Disp: , Rfl:   •  Cyanocobalamin (B-12) 1000 MCG capsule, , Disp: , Rfl:   •  DULoxetine (CYMBALTA) 20 MG capsule, 2 CAPSULES DAILY, WEAN AS TOLERATED TO OFF., Disp: , Rfl:   •  hydrocortisone 2.5 % cream, , Disp: , Rfl:   •  levonorgestrel (Mirena, 52 MG,) 20 MCG/24HR IUD, 1 each by Intrauterine route 1 (One) Time., Disp: , Rfl:   •  Multiple Vitamins-Minerals (MULTIVITAMIN ADULT PO), Take 1 tablet by mouth Daily. HOLD FOR SURGERY, Disp: , Rfl:   •  Omega-3 Fatty Acids (FISH OIL) 1200 MG capsule capsule, Take 1,200 mg by mouth Daily. EPA AND 900MG DHA HOLD FOR SURGERY, Disp: , Rfl:   •  Zinc 50 MG tablet, , Disp: , Rfl: .  She denies medication side effects.    All of the other chronic condition(s) listed above are stable w/o issues.    There were no vitals taken for this visit.    Results for  orders placed or performed in visit on 03/25/22   Comprehensive Metabolic Panel    Specimen: Blood   Result Value Ref Range    Glucose 107 (H) 65 - 99 mg/dL    BUN 7 6 - 20 mg/dL    Creatinine 0.61 0.57 - 1.00 mg/dL    Sodium 139 136 - 145 mmol/L    Potassium 4.3 3.5 - 5.2 mmol/L    Chloride 103 98 - 107 mmol/L    CO2 25.4 22.0 - 29.0 mmol/L    Calcium 9.4 8.6 - 10.5 mg/dL    Total Protein 7.2 6.0 - 8.5 g/dL    Albumin 4.60 3.50 - 5.20 g/dL    ALT (SGPT) 13 1 - 33 U/L    AST (SGOT) 15 1 - 32 U/L    Alkaline Phosphatase 64 39 - 117 U/L    Total Bilirubin 0.4 0.0 - 1.2 mg/dL    Globulin 2.6 gm/dL    A/G Ratio 1.8 g/dL    BUN/Creatinine Ratio 11.5 7.0 - 25.0    Anion Gap 10.6 5.0 - 15.0 mmol/L    eGFR 112.5 >60.0 mL/min/1.73   CBC Auto Differential    Specimen: Blood   Result Value Ref Range    WBC 6.89 3.40 - 10.80 10*3/mm3    RBC 4.57 3.77 - 5.28 10*6/mm3    Hemoglobin 13.5 12.0 - 15.9 g/dL    Hematocrit 40.6 34.0 - 46.6 %    MCV 88.8 79.0 - 97.0 fL    MCH 29.5 26.6 - 33.0 pg    MCHC 33.3 31.5 - 35.7 g/dL    RDW 12.7 12.3 - 15.4 %    RDW-SD 41.4 37.0 - 54.0 fl    MPV 8.6 6.0 - 12.0 fL    Platelets 359 140 - 450 10*3/mm3    Neutrophil % 51.3 42.7 - 76.0 %    Lymphocyte % 37.4 19.6 - 45.3 %    Monocyte % 7.5 5.0 - 12.0 %    Eosinophil % 2.2 0.3 - 6.2 %    Basophil % 1.0 0.0 - 1.5 %    Immature Grans % 0.6 (H) 0.0 - 0.5 %    Neutrophils, Absolute 3.53 1.70 - 7.00 10*3/mm3    Lymphocytes, Absolute 2.58 0.70 - 3.10 10*3/mm3    Monocytes, Absolute 0.52 0.10 - 0.90 10*3/mm3    Eosinophils, Absolute 0.15 0.00 - 0.40 10*3/mm3    Basophils, Absolute 0.07 0.00 - 0.20 10*3/mm3    Immature Grans, Absolute 0.04 0.00 - 0.05 10*3/mm3    nRBC 0.0 0.0 - 0.2 /100 WBC     Pt saw a psychiatry and got off the Cymbalta and is doing well.   She reports BPs at home of 120/80.      The following portions of the patient's history were reviewed and updated as appropriate: allergies, current medications, past family history, past medical  history, past social history, past surgical history, and problem list.    Review of Systems   Constitutional: Negative for activity change, chills and fever.   Respiratory: Negative for cough.    Cardiovascular: Negative for chest pain.   Psychiatric/Behavioral: Negative for dysphoric mood.       Objective   Physical Exam  Constitutional:       General: She is not in acute distress.     Appearance: She is well-developed.   Pulmonary:      Effort: Pulmonary effort is normal.   Neurological:      Mental Status: She is alert and oriented to person, place, and time.   Psychiatric:         Behavior: Behavior normal.         Thought Content: Thought content normal.             Diagnoses and all orders for this visit:    1. Essential hypertension  -     losartan (COZAAR) 100 MG tablet; Take 1 tablet by mouth Daily.  Dispense: 90 tablet; Refill: 3  -     Comprehensive metabolic panel  -     Lipid panel  -     CBC and Differential  -     TSH    Spent  11   minutes with chart and interview and consent for this encounter given by the patient.  You have chosen to receive care through a telehealth visit.  Do you consent to use a video/audio connection for your medical care today? Yes

## 2022-08-17 NOTE — TELEPHONE ENCOUNTER
Caller: Enrike Roxy MALLORY    Relationship: Self    Best call back number:  [phone]    Requested Prescriptions:   Requested Prescriptions     Pending Prescriptions Disp Refills   • losartan (COZAAR) 100 MG tablet 90 tablet 3     Sig: Take 1 tablet by mouth Daily.        Pharmacy where request should be sent: [facility] [address] [place] [address] - [phone]  - [phone] FX     Additional details provided by patient: 3 DAYS LEFT    Does the patient have less than a 3 day supply:  [] Yes  [x] No    Camilla Gibbons Rep   08/17/22 11:17 EDT

## 2022-09-10 LAB
ALBUMIN SERPL-MCNC: 4.5 G/DL (ref 3.5–5.2)
ALBUMIN/GLOB SERPL: 1.9 G/DL
ALP SERPL-CCNC: 60 U/L (ref 39–117)
ALT SERPL-CCNC: 16 U/L (ref 1–33)
AST SERPL-CCNC: 14 U/L (ref 1–32)
BASOPHILS # BLD AUTO: 0.07 10*3/MM3 (ref 0–0.2)
BASOPHILS NFR BLD AUTO: 1 % (ref 0–1.5)
BILIRUB SERPL-MCNC: 0.5 MG/DL (ref 0–1.2)
BUN SERPL-MCNC: 9 MG/DL (ref 6–20)
BUN/CREAT SERPL: 13.4 (ref 7–25)
CALCIUM SERPL-MCNC: 9.6 MG/DL (ref 8.6–10.5)
CHLORIDE SERPL-SCNC: 103 MMOL/L (ref 98–107)
CHOLEST SERPL-MCNC: 177 MG/DL (ref 0–200)
CO2 SERPL-SCNC: 27.4 MMOL/L (ref 22–29)
CREAT SERPL-MCNC: 0.67 MG/DL (ref 0.57–1)
EGFRCR-CYS SERPLBLD CKD-EPI 2021: 110 ML/MIN/1.73
EOSINOPHIL # BLD AUTO: 0.26 10*3/MM3 (ref 0–0.4)
EOSINOPHIL NFR BLD AUTO: 3.7 % (ref 0.3–6.2)
ERYTHROCYTE [DISTWIDTH] IN BLOOD BY AUTOMATED COUNT: 12.4 % (ref 12.3–15.4)
GLOBULIN SER CALC-MCNC: 2.4 GM/DL
GLUCOSE SERPL-MCNC: 94 MG/DL (ref 65–99)
HCT VFR BLD AUTO: 40.6 % (ref 34–46.6)
HDLC SERPL-MCNC: 60 MG/DL (ref 40–60)
HGB BLD-MCNC: 13.2 G/DL (ref 12–15.9)
IMM GRANULOCYTES # BLD AUTO: 0.02 10*3/MM3 (ref 0–0.05)
IMM GRANULOCYTES NFR BLD AUTO: 0.3 % (ref 0–0.5)
LDLC SERPL CALC-MCNC: 100 MG/DL (ref 0–100)
LYMPHOCYTES # BLD AUTO: 2.63 10*3/MM3 (ref 0.7–3.1)
LYMPHOCYTES NFR BLD AUTO: 37.7 % (ref 19.6–45.3)
MCH RBC QN AUTO: 29.7 PG (ref 26.6–33)
MCHC RBC AUTO-ENTMCNC: 32.5 G/DL (ref 31.5–35.7)
MCV RBC AUTO: 91.4 FL (ref 79–97)
MONOCYTES # BLD AUTO: 0.52 10*3/MM3 (ref 0.1–0.9)
MONOCYTES NFR BLD AUTO: 7.4 % (ref 5–12)
NEUTROPHILS # BLD AUTO: 3.48 10*3/MM3 (ref 1.7–7)
NEUTROPHILS NFR BLD AUTO: 49.9 % (ref 42.7–76)
NRBC BLD AUTO-RTO: 0 /100 WBC (ref 0–0.2)
PLATELET # BLD AUTO: 417 10*3/MM3 (ref 140–450)
POTASSIUM SERPL-SCNC: 4.4 MMOL/L (ref 3.5–5.2)
PROT SERPL-MCNC: 6.9 G/DL (ref 6–8.5)
RBC # BLD AUTO: 4.44 10*6/MM3 (ref 3.77–5.28)
SODIUM SERPL-SCNC: 140 MMOL/L (ref 136–145)
TRIGL SERPL-MCNC: 93 MG/DL (ref 0–150)
TSH SERPL DL<=0.005 MIU/L-ACNC: 1.71 UIU/ML (ref 0.27–4.2)
VLDLC SERPL CALC-MCNC: 17 MG/DL (ref 5–40)
WBC # BLD AUTO: 6.98 10*3/MM3 (ref 3.4–10.8)

## 2022-09-22 DIAGNOSIS — R91.8 PULMONARY NODULES/LESIONS, MULTIPLE: Primary | ICD-10-CM

## 2022-09-23 ENCOUNTER — HOSPITAL ENCOUNTER (OUTPATIENT)
Dept: CT IMAGING | Facility: HOSPITAL | Age: 47
Discharge: HOME | End: 2022-09-23
Admitting: INTERNAL MEDICINE

## 2022-09-23 DIAGNOSIS — R91.8 PULMONARY NODULES/LESIONS, MULTIPLE: ICD-10-CM

## 2022-09-23 PROCEDURE — 71250 CT THORAX DX C-: CPT

## 2022-09-27 ENCOUNTER — LAB (OUTPATIENT)
Dept: OTHER | Facility: HOSPITAL | Age: 47
End: 2022-09-27

## 2022-09-27 ENCOUNTER — OFFICE VISIT (OUTPATIENT)
Dept: ONCOLOGY | Facility: CLINIC | Age: 47
End: 2022-09-27

## 2022-09-27 VITALS
WEIGHT: 174.5 LBS | OXYGEN SATURATION: 96 % | SYSTOLIC BLOOD PRESSURE: 134 MMHG | DIASTOLIC BLOOD PRESSURE: 86 MMHG | BODY MASS INDEX: 32.94 KG/M2 | HEIGHT: 61 IN | RESPIRATION RATE: 16 BRPM | TEMPERATURE: 98.2 F | HEART RATE: 78 BPM

## 2022-09-27 DIAGNOSIS — Z15.09 MONOALLELIC MUTATION OF EGFR GENE: Primary | ICD-10-CM

## 2022-09-27 DIAGNOSIS — Z15.89 MONOALLELIC MUTATION OF EGFR GENE: Primary | ICD-10-CM

## 2022-09-27 DIAGNOSIS — R91.8 LUNG NODULES: ICD-10-CM

## 2022-09-27 DIAGNOSIS — R91.8 PULMONARY NODULES/LESIONS, MULTIPLE: ICD-10-CM

## 2022-09-27 LAB
BASOPHILS # BLD AUTO: 0.07 10*3/MM3 (ref 0–0.2)
BASOPHILS NFR BLD AUTO: 0.6 % (ref 0–1.5)
DEPRECATED RDW RBC AUTO: 38.1 FL (ref 37–54)
EOSINOPHIL # BLD AUTO: 0.18 10*3/MM3 (ref 0–0.4)
EOSINOPHIL NFR BLD AUTO: 1.5 % (ref 0.3–6.2)
ERYTHROCYTE [DISTWIDTH] IN BLOOD BY AUTOMATED COUNT: 12.2 % (ref 12.3–15.4)
HCT VFR BLD AUTO: 39.9 % (ref 34–46.6)
HGB BLD-MCNC: 13.8 G/DL (ref 12–15.9)
IMM GRANULOCYTES # BLD AUTO: 0.04 10*3/MM3 (ref 0–0.05)
IMM GRANULOCYTES NFR BLD AUTO: 0.3 % (ref 0–0.5)
LYMPHOCYTES # BLD AUTO: 3.33 10*3/MM3 (ref 0.7–3.1)
LYMPHOCYTES NFR BLD AUTO: 27.4 % (ref 19.6–45.3)
MCH RBC QN AUTO: 29.7 PG (ref 26.6–33)
MCHC RBC AUTO-ENTMCNC: 34.6 G/DL (ref 31.5–35.7)
MCV RBC AUTO: 86 FL (ref 79–97)
MONOCYTES # BLD AUTO: 0.75 10*3/MM3 (ref 0.1–0.9)
MONOCYTES NFR BLD AUTO: 6.2 % (ref 5–12)
NEUTROPHILS NFR BLD AUTO: 64 % (ref 42.7–76)
NEUTROPHILS NFR BLD AUTO: 7.78 10*3/MM3 (ref 1.7–7)
NRBC BLD AUTO-RTO: 0 /100 WBC (ref 0–0.2)
PLATELET # BLD AUTO: 398 10*3/MM3 (ref 140–450)
PMV BLD AUTO: 8.5 FL (ref 6–12)
RBC # BLD AUTO: 4.64 10*6/MM3 (ref 3.77–5.28)
WBC NRBC COR # BLD: 12.15 10*3/MM3 (ref 3.4–10.8)

## 2022-09-27 PROCEDURE — 36415 COLL VENOUS BLD VENIPUNCTURE: CPT

## 2022-09-27 PROCEDURE — 99214 OFFICE O/P EST MOD 30 MIN: CPT | Performed by: INTERNAL MEDICINE

## 2022-09-27 PROCEDURE — 85025 COMPLETE CBC W/AUTO DIFF WBC: CPT | Performed by: INTERNAL MEDICINE

## 2022-09-27 NOTE — PROGRESS NOTES
Subjective Patient feeling well, discussed repeat CT scan without change, concern for vaccination status for COVID-19    REASON FOR CONSULTATION: Germline EGFR mutation        REQUESTING PHYSICIAN: Jack Velázquez MD, Gio Burnett MD    History of Present Illness  Actually        The patient is a 46-year-old female non-smoker though with a significant secondhand smoke exposure as well is a family history with her mother dying from adenocarcinoma the lung.  There is, additionally a strong family history of breast carcinoma and the patient was assessed undergoing genetic counseling.  This revealed evidence of an EGFR mutation.   The pathologic variant determined was EGFR (c.2369C>T) ([id number]).  There was also a variant of uncertain significance in a single ALK gene (c.640CT) the ALK gene is associated increased neuroblastoma though most variants of uncertain significance are usually reclassified as benign.     A review of available literature indicates that up to 20% of cases of lung cancer are familial in nature with an increased genetic predisposition greater than the general population.  Perhaps 5 to 10% may be hereditary from a pathogenic variant in the single gene that may increase the lifetime risk of lung cancer and places first-degree relatives at a 50% risk of being simply affected unless they have not inherited the pathogenic variant.  The most common gene associated with hereditary susceptibility is EGFR with T790M representing the most common variant.  These may represent up to 1.51% of lung cancer with a greater association in non-smokers, early onset in age, often described more commonly in females and over half of cases reveal a pathogenic variant in EGFR in the lung cancer specimen found in the individual who also has a germline mutation gene.     Reports indicate that individual with a pathogenic variant the risk of lung cancer may be as high as 31% and there were no clear surveillance  guidelines for infecting individual with his pathogenic variant has never had lung cancer.  This patient underwent a screening CAT scan of the chest after assessment by pulmonary medicine July 14, 2020.  This revealed multiple pulmonary nodules within both lung fields some with groundglass density and others more solid in appearance.  The largest was in the left upper lobe measuring 7 mm with no evidence of lymphadenopathy.  There is also a 3 cm left paraspinal cyst at T5 thought to be benign.     It was determined that the patient be admitted for a left robot-assisted wedge resection of the lung nodules as well as the paraspinal cyst biopsy if indicated.  This was performed August 21, 2020 with the patient going bronchoscopy in the left VAT.  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  The patient's case was discussed in thoracic conference September 8, 2020.  Plans were made for her to be seen via medical oncology and determine how we might proceed for therapeutic options and/or surveillance.  She is met with her  and we have discussed her findings over approximately 70 minutes.  This led to baseline studies including a sed rate of 6, CEA of 1.87, LDH of 134, CMP with BUN/creatinine 15 and 0.58 and follow-up baseline examinations of CT of chest abdomen pelvis October 16 demonstrating her postsurgical scarring left upper lobe, stable 0.6 cm noncalcified pulmonary nodule left upper lobe with multiple groundglass opacities/nodules seen in the right upper lobe that appears stable, findings in abdomen pelvis with normal spleen, adrenal glands, kidneys, liver, gallbladder and pancreas, potential IUD extending to the myometrium-possible malposition that will need to be followed up.  The patient is seen October 2016 indicates that she feels relatively well.  She is surprised by the IUD question but sees Dr. Annette Parekh concerning this.  We have discussed  follow-up that includes chest x-ray at 6 months, low-dose screening chest CT likely yearly.  The patient is next seen April 16, 2021.  She has undergone a follow-up chest x-ray reveals no acute abnormalities.  She is feeling well with no additional changes in her performance status.  We discussed a follow-up low-dose scan scheduled least yearly.  The IUD issue has been addressed, incidentally, by replacement in the interval.      It was elected to have the patient undergo a low-dose CT scan which is now performed 9/24/2021 and which shows numerous bilateral groundglass and mixed density nodules without appreciable change including a mixed density right upper lobe measuring8 millimeters, stable density anterior left upper lobe a 7 mm.  There are postoperative changes from wedge resection left upper lobe with no evidence lymphadenopathy or pleural effusion.      The patient seen 9/28/2021 feeling well with no additional respiratory symptoms.  We, additionally, discussed her COVID-19 vaccination and her previous vaccination was at the beginning of 2021.  Considering the abnormalities currently present in her lungs she could be at significant risk for Covid pneumonia complications should she develop the disease.  We have agreed that she will be checked for her antibody studies now.    The patient proceeded to repeat CT of the chest 9/23/2022 which revealed stable findings and no new abnormalities.  The patient is seen back we have discussed her routine follow-up with the above examinations.      Past Medical History:   Diagnosis Date   • Allergic rhinitis    • Anemia     as child   • Anxiety    • Encounter for IUD insertion 11/2020    MIRENA    • H/O bone density study never   • H/O complete eye exam 02/2008    dr qureshi   • H/O Lung nodules    • History of hepatitis     AS CHILD   • History of multiple pulmonary nodules    • Hypertension    • Hypertension in pregnancy    • PONV (postoperative nausea and vomiting)    •  "Seasonal allergies    • Vaginal delivery     08 DR TAN BABY \"KYDWYN\" 6.12        Past Surgical History:   Procedure Laterality Date   •  SECTION  2006 AND    • COLONOSCOPY N/A 2022    Procedure: COLONOSCOPY;  Surgeon: Laly Banegas MD;  Location: [facility] MAIN OR;  Service: Gastroenterology;  Laterality: N/A;  normal   • EAR TUBES      Tympanostomy tube placement as a child multiple times   • THORACOSCOPY Left 2020    Procedure: BRONCHOSCOPY, LEFT VIDEO ASSISTED THORACOSCOPY WITH DAVINCI ROBOT ASSISTED LEFT UPPER LOBE WEDGE RESECTION, LEFT LOWER LOBE WEDGE RESECTION, RESECTION OF MEDIASTINAL CYST,  INTERCOSTAL NERVE BLOCKS;  Surgeon: Gio Burnett III, MD;  Location: [facility] MAIN OR;  Service: [facility];  Laterality: Left;   • TONSILLECTOMY     • TYMPANOPLASTY     • WISDOM TOOTH EXTRACTION          Current Outpatient Medications on File Prior to Visit   Medication Sig Dispense Refill   • ascorbic acid (VITAMIN C) 1000 MG tablet      • aspirin 81 MG EC tablet      • busPIRone (BUSPAR) 15 MG tablet Take 1 tablet by mouth 3 (Three) Times a Day. 270 tablet 3   • CALCIUM CITRATE-VITAMIN D3 PO Take 1 tablet by mouth Daily. 1200 MG  HOLD FOR SURGERY     • hydrocortisone 2.5 % cream      • levonorgestrel (Mirena, 52 MG,) 20 MCG/24HR IUD 1 each by Intrauterine route 1 (One) Time.     • losartan (COZAAR) 100 MG tablet Take 1 tablet by mouth Daily. 90 tablet 3   • Multiple Vitamins-Minerals (MULTIVITAMIN ADULT PO) Take 1 tablet by mouth Daily. HOLD FOR SURGERY     • Omega-3 Fatty Acids (FISH OIL) 1200 MG capsule capsule Take 1,200 mg by mouth Daily. EPA AND 900MG DHA  HOLD FOR SURGERY     • Acetylcysteine, Nutrient, 600 MG tablet      • Cyanocobalamin (B-12) 1000 MCG capsule      • DULoxetine (CYMBALTA) 20 MG capsule 2 CAPSULES DAILY, WEAN AS TOLERATED TO OFF.     • Zinc 50 MG tablet        No current facility-administered medications on file prior to visit.        ALLERGIES:  " "  Allergies   Allergen Reactions   • Latex Swelling     SKIN REDNESS AND SWELLING        Social History     Socioeconomic History   • Marital status:      Spouse name: Jack Calderón   • Number of children: 2   • Years of education: College   Tobacco Use   • Smoking status: Never Smoker   • Smokeless tobacco: Never Used   Vaping Use   • Vaping Use: Never used   Substance and Sexual Activity   • Alcohol use: Yes     Comment: WEEKLY-WINE OR LIQUOR   • Drug use: Never   • Sexual activity: Defer     Birth control/protection: I.U.D.        Family History   Problem Relation Age of Onset   • ALS Maternal Grandfather    • Hypertension Maternal Grandfather    • Diabetes Maternal Uncle    • Hypertension Maternal Grandmother    • Lung cancer Mother    • HIV Father    • No Known Problems Daughter    • No Known Problems Son    • No Known Problems Paternal Grandmother    • No Known Problems Maternal Aunt    • No Known Problems Paternal Aunt    • No Known Problems Paternal Grandfather    • BRCA 1/2 Neg Hx    • Breast cancer Neg Hx    • Colon cancer Neg Hx    • Endometrial cancer Neg Hx    • Ovarian cancer Neg Hx    • Malig Hyperthermia Neg Hx         Review of Systems   Constitutional: Positive for diaphoresis (night sweats). Negative for fatigue.   Respiratory: Negative for chest tightness, shortness of breath and wheezing.    Gastrointestinal: Positive for constipation. Negative for abdominal pain, diarrhea, nausea and vomiting.   Neurological: Negative for weakness.       Objective     Vitals:    09/27/22 1614   BP: 134/86   Pulse: 78   Resp: 16   Temp: 98.2 °F (36.8 °C)   TempSrc: Temporal   SpO2: 96%   Weight: 79.2 kg (174 lb 8 oz)   Height: 154.9 cm (60.98\")   PainSc: 0-No pain     Current Status 9/27/2022   ECOG score 0       Physical Exam  Constitutional:       General: She is not in acute distress.     Appearance: She is normal weight. She is not ill-appearing.   HENT:      Head: Normocephalic and atraumatic.      " Nose: Nose normal.      Mouth/Throat:      Mouth: Mucous membranes are moist.      Pharynx: Oropharynx is clear. No oropharyngeal exudate.   Eyes:      Extraocular Movements: Extraocular movements intact.      Conjunctiva/sclera: Conjunctivae normal.      Pupils: Pupils are equal, round, and reactive to light.   Cardiovascular:      Rate and Rhythm: Normal rate and regular rhythm.      Pulses: Normal pulses.      Heart sounds: No murmur heard.    No gallop.   Pulmonary:      Effort: Pulmonary effort is normal. No respiratory distress.      Breath sounds: Normal breath sounds. No wheezing or rales.   Abdominal:      General: Abdomen is flat. Bowel sounds are normal. There is no distension.      Palpations: Abdomen is soft. There is no mass.      Tenderness: There is no abdominal tenderness. There is no guarding.   Musculoskeletal:         General: Normal range of motion.      Cervical back: Normal range of motion and neck supple.   Skin:     General: Skin is warm and dry.      Findings: No rash.   Neurological:      General: No focal deficit present.      Mental Status: She is alert and oriented to person, place, and time. Mental status is at baseline.   Psychiatric:         Mood and Affect: Mood normal.         Thought Content: Thought content normal.         RECENT LABS:  Hematology WBC   Date Value Ref Range Status   09/27/2022 12.15 (H) 3.40 - 10.80 10*3/mm3 Final   09/09/2022 6.98 3.40 - 10.80 10*3/mm3 Final     RBC   Date Value Ref Range Status   09/27/2022 4.64 3.77 - 5.28 10*6/mm3 Final   09/09/2022 4.44 3.77 - 5.28 10*6/mm3 Final     Hemoglobin   Date Value Ref Range Status   09/27/2022 13.8 12.0 - 15.9 g/dL Final     Hematocrit   Date Value Ref Range Status   09/27/2022 39.9 34.0 - 46.6 % Final     Platelets   Date Value Ref Range Status   09/27/2022 398 140 - 450 10*3/mm3 Final      EXAMINATIONS: CT OF THE CHEST  9/24/2021  FINDINGS: Numerous bilateral groundglass and mixed density nodules are  again  noted. These are without appreciable change. The mixed density  nodule in the right upper lobe on image 103 measures about 8 mm, which  is stable. Mixed density nodule in the anterior left upper lobe on image  86 measures about 7 mm which is also stable. Redemonstrated are  postoperative changes from wedge resection in the left upper lobe. No  evidence for lymphadenopathy or pleural effusion. Limited imaging of the  upper abdomen is unremarkable. No acute bony abnormality.         IMPRESSION:  Stable bilateral pulmonary nodules. Continued follow-up recommended    CT Chest Without Contrast Diagnostic (09/23/2022 17:09)    FINDINGS: Left upper and lower lobe wedge resections again noted.  Multiple bilateral subcentimeter groundglass and mixed density nodules  are again demonstrated measuring up to 8 mm. These are without  appreciable change. There are mainly located in the upper lobes. No  appreciable lymphadenopathy. No pleural effusion. Limited imaging of the  upper abdomen is unremarkable. No acute bony abnormality.     IMPRESSION:  Stable lung nodules. Continued follow-up recommended      Assessment & Plan     45-year-old female who recently underwent assessment after dense breast tissue was recognized with a strong family history of breast carcinoma.  She underwent genetic counseling revealing, unexpectedly, evidence of an EGFR mutation  The pathologic variant determined was EGFR (c.2369C>T) ([id number]).  There was also a variant of uncertain significance in a single ALK gene (c.640CT).  Her additional family history includes her mother dying from adenocarcinoma of the lung after developing disease in her late 50s with a long history of tobacco smoking.    The patient was reviewed by pulmonary medicine initially with a chest x-ray that revealed potential granulomatous disease though, thereafter, chest CT demonstrated multiple pulmonary nodules several with groundglass density and others with more solid  appearance.  Is also a 3 cm left paraspinal cyst thought to be benign.  Patient was referred to thoracic surgery undergoing a left robot-assisted wedge resection as well as removal of the paraspinal cyst August 21.  Final pathology revealed atypical pneumocyte hyperplasia and her case was brought to thoracic conference for assessment.  A follow-up chest CT is planned in mid October and then additional screening in March of next year.  She is seen with her  September 16 initial consultation.                                                                                       Recent review article from 2019 again describes germline mutations  Estimated EGFR with T790M associated with specific lung cancer syndrome targeted never smokers.  The mutations are rare but EGFR is considered to be a major cancer predisposition gene associated with an estimated 31% risk of lung cancer non-smoking carriers the mutation itself appears to be weakly oncogenic when associated with the common activating mutation the potential is enhanced.  73% of patients lung cancer in the germline T790 mutation have been found to have a second activity mutation most commonly the L858R mutation.     Such germline mutations are thought to be present in 1% of non-small cell lung cancer cases with a meeting age of diagnosis 40 years, most common histology adenocarcinoma, seemingly more common in [place] rather than [place] and in females and non-smoking status.     The most frequent presentation was bilateral groundglass opacities and pulmonary nodules with an indolent disease course.  Screening selected populations has no benefit but the germline mutation is present 50% of patients with baseline T790 EGFR mutation in pretreatment evaluation leading to the possibility of routine germline genotyping.     The  INHERIT EGFR study from [facility] investigatived families with this variant with 105 participants.  Germline EGFR mutations were  found and 63% of patients with EGFR T790M detected at lung cancer diagnosis and in 62% and 44% of first and second-degree relatives of germline carriers respectively.  The meeting age appears to be 57 years of age and 65% of cases are diagnosed at advanced age suggesting an indolent multifocal nodular phase progressed in a lymph node in the remote metastatic disease.     This was discussed with the patient and her  in in detail when seen September 16, 2020.  There is not any indication to treat her though certainly a surveillance assessment schedule is reasonable.  She and her  agree and, at this point, and we proceeded to assess with baseline studies and repeat CT scanning.  Fortunately there is no evidence of any additional or progressive disease in this patient.    The patient return for follow-up chest x-ray April 8, 2021 with no acute process.  The patient clinically feels well and, incidentally, had her IUD replaced after her previous discussion here in office concerning findings on her CT scans.  She is feeling well with an excellent performance status we discussed a follow-up yearly low-dose CT scan of chest to which she is agreeable.    The patient follow-up chest CT 9/24/2021 without significant change.  Interview 9/28 we plan to reassess regularly but also determine her COVID-19 antibody level.        Plan:    *Chest x-ray 6 months, NP or MD      *Anticipate subsequent low-dose chest CT yearly

## 2023-02-14 ENCOUNTER — OFFICE VISIT (OUTPATIENT)
Dept: OBSTETRICS AND GYNECOLOGY | Age: 48
End: 2023-02-14
Payer: COMMERCIAL

## 2023-02-14 VITALS
WEIGHT: 167 LBS | DIASTOLIC BLOOD PRESSURE: 76 MMHG | SYSTOLIC BLOOD PRESSURE: 124 MMHG | BODY MASS INDEX: 31.53 KG/M2 | HEIGHT: 61 IN

## 2023-02-14 DIAGNOSIS — Z01.419 ENCOUNTER FOR GYNECOLOGICAL EXAMINATION WITHOUT ABNORMAL FINDING: Primary | ICD-10-CM

## 2023-02-14 DIAGNOSIS — Z12.31 ENCOUNTER FOR SCREENING MAMMOGRAM FOR MALIGNANT NEOPLASM OF BREAST: ICD-10-CM

## 2023-02-14 DIAGNOSIS — N95.1 MENOPAUSAL SYMPTOMS: ICD-10-CM

## 2023-02-14 PROCEDURE — 99396 PREV VISIT EST AGE 40-64: CPT | Performed by: OBSTETRICS & GYNECOLOGY

## 2023-02-14 NOTE — PROGRESS NOTES
"Routine Annual Visit    2023    Patient: Roxy Calvert          MR#:[id number]      Chief Complaint   Patient presents with   • Gynecologic Exam     last pap 2022 neg, last mg 2022       History of Present Illness    47 y.o. female  who presents for annual exam.     mirena , no menses  Having night sweats- will check [facility]  UTD pap and CSC  Due for mammo  Kids are well, 17,14      No LMP recorded (lmp unknown). Patient has had an implant.  Obstetric History:  OB History        4    Para   3    Term   2       1    AB        Living   2       SAB        IAB        Ectopic        Molar        Multiple        Live Births   2               Menstrual History:     No LMP recorded (lmp unknown). Patient has had an implant.       Sexual History:       ________________________________________  Patient Active Problem List   Diagnosis   • Hypertension   • Anxiety   • Pulmonary nodules/lesions, multiple   • Lung nodules   • Monoallelic mutation of EGFR gene   • Colon cancer screening       Past Medical History:   Diagnosis Date   • Allergic rhinitis    • Anemia     as child   • Anxiety    • Encounter for IUD insertion 2020    MIRENA    • H/O bone density study never   • H/O complete eye exam 2008    dr qureshi   • H/O Lung nodules    • History of hepatitis     AS CHILD   • History of multiple pulmonary nodules    • Hypertension    • Hypertension in pregnancy    • PONV (postoperative nausea and vomiting)    • Seasonal allergies    • Vaginal delivery     08 DR TAN BABY \"KYDWYN\" 6.12       Past Surgical History:   Procedure Laterality Date   •  SECTION  2006 AND    • COLONOSCOPY N/A 2022    Procedure: COLONOSCOPY;  Surgeon: Laly Banegas MD;  Location[place] [facility] [place] [facility];  Service: Gastroenterology;  Laterality: N/A;  normal   • EAR TUBES      Tympanostomy tube placement as a child multiple times   • THORACOSCOPY Left 2020    Procedure: BRONCHOSCOPY, LEFT VIDEO " "ASSISTED THORACOSCOPY WITH DAVINCI ROBOT ASSISTED LEFT UPPER LOBE WEDGE RESECTION, LEFT LOWER LOBE WEDGE RESECTION, RESECTION OF MEDIASTINAL CYST,  INTERCOSTAL NERVE BLOCKS;  Surgeon: Gio Burnett III, MD;  Location: [facility];  Service: [facility];  Laterality: Left;   • TONSILLECTOMY     • TYMPANOPLASTY  1999   • WISDOM TOOTH EXTRACTION  1999       Social History     Tobacco Use   Smoking Status Never   Smokeless Tobacco Never       has a current medication list which includes the following prescription(s): ascorbic acid, calcium citrate-vitamin d, hydrocortisone, mirena (52 mg), losartan, multiple vitamin, and fish oil.  ________________________________________    Current contraception: IUD  History of abnormal Pap smear: no  Family history of Breast cancer: no  Family history of uterine or ovarian cancer: no  Family History of colon cancer/colon polyps: no  History of abnormal mammogram: no      The following portions of the patient's history were reviewed and updated as appropriate: allergies, current medications, past family history, past medical history, past social history, past surgical history and problem list.    Review of Systems    Pertinent items are noted in HPI.     Objective   Physical Exam    /76   Ht 154.9 cm (61\")   Wt 75.8 kg (167 lb)   LMP  (LMP Unknown)   BMI 31.55 kg/m²    BP Readings from Last 3 Encounters:   02/14/23 124/76   09/27/22 134/86   03/25/22 142/87      Wt Readings from Last 3 Encounters:   02/14/23 75.8 kg (167 lb)   09/27/22 79.2 kg (174 lb 8 oz)   03/25/22 77.2 kg (170 lb 4.8 oz)      BMI: Estimated body mass index is 31.55 kg/m² as calculated from the following:    Height as of this encounter: 154.9 cm (61\").    Weight as of this encounter: 75.8 kg (167 lb).      General:   alert, appears stated age and cooperative   Abdomen: soft, non-tender, without masses or organomegaly   Breast: inspection negative, no nipple discharge or bleeding, no masses or " nodularity palpable   Vulva: normal   Vagina: normal mucosa   Cervix: Non tender, string in place   Uterus: normal size, mobile and non-tender   Adnexa: no mass, fullness, tenderness     Assessment:    1. Normal annual exam   Assessment     ICD-10-CM ICD-9-CM   1. Encounter for gynecological examination without abnormal finding  Z01.419 V72.31   2. Encounter for screening mammogram for malignant neoplasm of breast  Z12.31 V76.12   3. Menopausal symptoms  N95.1 627.2     Plan:    Plan     [x]  Mammogram request made  []  PAP done  []  Labs:   []  GC/Chl/TV  []  DEXA scan   []  Referral for colonoscopy:       Diagnoses and all orders for this visit:    1. Encounter for gynecological examination without abnormal finding (Primary)    2. Encounter for screening mammogram for malignant neoplasm of breast  -     Mammo screening digital tomosynthesis bilateral w CAD; Future    3. Menopausal symptoms  -     Follicle Stimulating Hormone            Counseling:  --Nutrition: Stressed importance of moderation and caloric balance, stressed fresh fruit and vegetables  --Exercise: Stressed the importance of regular exercise. 3-5 times weekly   - Discussed screening mammogram recommendations.   --Discussed benefits of screening colonoscopy- age 45 unless FH  --Discussed pap smear screening recommendations

## 2023-02-15 LAB — FSH SERPL-ACNC: 4.9 MIU/ML

## 2023-02-20 ENCOUNTER — APPOINTMENT (OUTPATIENT)
Dept: WOMENS IMAGING | Facility: HOSPITAL | Age: 48
End: 2023-02-20
Payer: COMMERCIAL

## 2023-02-20 PROCEDURE — 77067 SCR MAMMO BI INCL CAD: CPT | Performed by: RADIOLOGY

## 2023-02-20 PROCEDURE — 77063 BREAST TOMOSYNTHESIS BI: CPT | Performed by: RADIOLOGY

## 2023-02-23 DIAGNOSIS — N64.89 BREAST ASYMMETRY: Primary | ICD-10-CM

## 2023-03-09 ENCOUNTER — APPOINTMENT (OUTPATIENT)
Dept: WOMENS IMAGING | Facility: HOSPITAL | Age: 48
End: 2023-03-09
Payer: COMMERCIAL

## 2023-03-09 PROCEDURE — 76642 ULTRASOUND BREAST LIMITED: CPT | Performed by: RADIOLOGY

## 2023-03-09 PROCEDURE — G0279 TOMOSYNTHESIS, MAMMO: HCPCS | Performed by: RADIOLOGY

## 2023-03-09 PROCEDURE — 77061 BREAST TOMOSYNTHESIS UNI: CPT | Performed by: RADIOLOGY

## 2023-03-09 PROCEDURE — 77065 DX MAMMO INCL CAD UNI: CPT | Performed by: RADIOLOGY

## 2023-03-10 ENCOUNTER — TELEPHONE (OUTPATIENT)
Dept: OBSTETRICS AND GYNECOLOGY | Age: 48
End: 2023-03-10
Payer: COMMERCIAL

## 2023-03-11 ENCOUNTER — TELEPHONE (OUTPATIENT)
Dept: OBSTETRICS AND GYNECOLOGY | Age: 48
End: 2023-03-11
Payer: COMMERCIAL

## 2023-03-11 DIAGNOSIS — N63.0 BREAST MASS IN FEMALE: Primary | ICD-10-CM

## 2023-03-16 ENCOUNTER — APPOINTMENT (OUTPATIENT)
Dept: WOMENS IMAGING | Facility: HOSPITAL | Age: 48
End: 2023-03-16
Payer: COMMERCIAL

## 2023-03-16 PROCEDURE — 19083 BX BREAST 1ST LESION US IMAG: CPT | Performed by: RADIOLOGY

## 2023-03-16 PROCEDURE — A4648 IMPLANTABLE TISSUE MARKER: HCPCS | Performed by: RADIOLOGY

## 2023-03-23 DIAGNOSIS — D24.2 FIBROADENOMA OF LEFT BREAST: Primary | ICD-10-CM

## 2023-03-31 ENCOUNTER — HOSPITAL ENCOUNTER (OUTPATIENT)
Dept: GENERAL RADIOLOGY | Facility: HOSPITAL | Age: 48
Discharge: HOME | End: 2023-03-31
Admitting: INTERNAL MEDICINE
Payer: COMMERCIAL

## 2023-03-31 DIAGNOSIS — Z15.89 MONOALLELIC MUTATION OF EGFR GENE: ICD-10-CM

## 2023-03-31 DIAGNOSIS — R91.8 LUNG NODULES: ICD-10-CM

## 2023-03-31 DIAGNOSIS — Z15.09 MONOALLELIC MUTATION OF EGFR GENE: ICD-10-CM

## 2023-03-31 PROCEDURE — 71046 X-RAY EXAM CHEST 2 VIEWS: CPT

## 2023-04-04 ENCOUNTER — OFFICE VISIT (OUTPATIENT)
Dept: ONCOLOGY | Facility: CLINIC | Age: 48
End: 2023-04-04
Payer: COMMERCIAL

## 2023-04-04 ENCOUNTER — LAB (OUTPATIENT)
Dept: OTHER | Facility: HOSPITAL | Age: 48
End: 2023-04-04
Payer: COMMERCIAL

## 2023-04-04 VITALS
HEART RATE: 78 BPM | BODY MASS INDEX: 31.89 KG/M2 | TEMPERATURE: 98.2 F | RESPIRATION RATE: 18 BRPM | HEIGHT: 61 IN | DIASTOLIC BLOOD PRESSURE: 89 MMHG | OXYGEN SATURATION: 96 % | WEIGHT: 168.9 LBS | SYSTOLIC BLOOD PRESSURE: 146 MMHG

## 2023-04-04 DIAGNOSIS — R91.8 LUNG NODULES: ICD-10-CM

## 2023-04-04 DIAGNOSIS — Z15.09 MONOALLELIC MUTATION OF EGFR GENE: ICD-10-CM

## 2023-04-04 DIAGNOSIS — R91.8 LUNG NODULES: Primary | ICD-10-CM

## 2023-04-04 DIAGNOSIS — Z15.89 MONOALLELIC MUTATION OF EGFR GENE: ICD-10-CM

## 2023-04-04 LAB
BASOPHILS # BLD AUTO: 0.08 10*3/MM3 (ref 0–0.2)
BASOPHILS NFR BLD AUTO: 0.9 % (ref 0–1.5)
DEPRECATED RDW RBC AUTO: 36.5 FL (ref 37–54)
EOSINOPHIL # BLD AUTO: 0.19 10*3/MM3 (ref 0–0.4)
EOSINOPHIL NFR BLD AUTO: 2 % (ref 0.3–6.2)
ERYTHROCYTE [DISTWIDTH] IN BLOOD BY AUTOMATED COUNT: 11.9 % (ref 12.3–15.4)
HCT VFR BLD AUTO: 37.1 % (ref 34–46.6)
HGB BLD-MCNC: 13.3 G/DL (ref 12–15.9)
IMM GRANULOCYTES # BLD AUTO: 0.03 10*3/MM3 (ref 0–0.05)
IMM GRANULOCYTES NFR BLD AUTO: 0.3 % (ref 0–0.5)
LYMPHOCYTES # BLD AUTO: 3.25 10*3/MM3 (ref 0.7–3.1)
LYMPHOCYTES NFR BLD AUTO: 34.9 % (ref 19.6–45.3)
MCH RBC QN AUTO: 30.4 PG (ref 26.6–33)
MCHC RBC AUTO-ENTMCNC: 35.8 G/DL (ref 31.5–35.7)
MCV RBC AUTO: 84.7 FL (ref 79–97)
MONOCYTES # BLD AUTO: 0.66 10*3/MM3 (ref 0.1–0.9)
MONOCYTES NFR BLD AUTO: 7.1 % (ref 5–12)
NEUTROPHILS NFR BLD AUTO: 5.09 10*3/MM3 (ref 1.7–7)
NEUTROPHILS NFR BLD AUTO: 54.8 % (ref 42.7–76)
NRBC BLD AUTO-RTO: 0 /100 WBC (ref 0–0.2)
PLATELET # BLD AUTO: 424 10*3/MM3 (ref 140–450)
PMV BLD AUTO: 8.8 FL (ref 6–12)
RBC # BLD AUTO: 4.38 10*6/MM3 (ref 3.77–5.28)
WBC NRBC COR # BLD: 9.3 10*3/MM3 (ref 3.4–10.8)

## 2023-04-04 PROCEDURE — 85025 COMPLETE CBC W/AUTO DIFF WBC: CPT | Performed by: INTERNAL MEDICINE

## 2023-04-04 PROCEDURE — 99214 OFFICE O/P EST MOD 30 MIN: CPT | Performed by: INTERNAL MEDICINE

## 2023-04-04 RX ORDER — GUAIFENESIN/EPHEDRINE HCL 200-12.5MG
TABLET ORAL
COMMUNITY
Start: 2022-12-11

## 2023-04-04 NOTE — LETTER
April 4, 2023     Jack Velázquez MD  [address] [facility]  [address]  [facility] [address]    Patient: Roxy Calvert   YOB: 1975   Date of Visit: 4/4/2023       Dear Dr. Melania MD:    Thank you for referring Roxy Calvert to me for evaluation. Below are the relevant portions of my assessment and plan of care.    If you have questions, please do not hesitate to call me. I look forward to following Roxy along with you.         Sincerely,        Scott Belle MD        CC: No Recipients    Scott Belle MD  04/04/23 1708  Signed    Subjective Patient feeling well, seen with her , chronic cough persists  REASON FOR CONSULTATION: Germline EGFR mutation        REQUESTING PHYSICIAN: Jack Velázquez MD, Gio Burnett MD    History of Present Illness  Actually        The patient is a 47-year-old female non-smoker though with a significant secondhand smoke exposure as well is a family history with her mother dying from adenocarcinoma the lung.  There is, additionally a strong family history of breast carcinoma and the patient was assessed undergoing genetic counseling.  This revealed evidence of an EGFR mutation.   The pathologic variant determined was EGFR (c.2369C>T) ([id number]).  There was also a variant of uncertain significance in a single ALK gene (c.640CT) the ALK gene is associated increased neuroblastoma though most variants of uncertain significance are usually reclassified as benign.     A review of available literature indicates that up to 20% of cases of lung cancer are familial in nature with an increased genetic predisposition greater than the general population.  Perhaps 5 to 10% may be hereditary from a pathogenic variant in the single gene that may increase the lifetime risk of lung cancer and places first-degree relatives at a 50% risk of being simply affected unless they have not inherited the pathogenic variant.  The most common gene associated with hereditary  susceptibility is EGFR with T790M representing the most common variant.  These may represent up to 1.51% of lung cancer with a greater association in non-smokers, early onset in age, often described more commonly in females and over half of cases reveal a pathogenic variant in EGFR in the lung cancer specimen found in the individual who also has a germline mutation gene.     Reports indicate that individual with a pathogenic variant the risk of lung cancer may be as high as 31% and there were no clear surveillance guidelines for infecting individual with his pathogenic variant has never had lung cancer.  This patient underwent a screening CAT scan of the chest after assessment by pulmonary medicine July 14, 2020.  This revealed multiple pulmonary nodules within both lung fields some with groundglass density and others more solid in appearance.  The largest was in the left upper lobe measuring 7 mm with no evidence of lymphadenopathy.  There is also a 3 cm left paraspinal cyst at T5 thought to be benign.     It was determined that the patient be admitted for a left robot-assisted wedge resection of the lung nodules as well as the paraspinal cyst biopsy if indicated.  This was performed August 21, 2020 with the patient going bronchoscopy in the left VAT.  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  The patient's case was discussed in thoracic conference September 8, 2020.  Plans were made for her to be seen via medical oncology and determine how we might proceed for therapeutic options and/or surveillance.  She is met with her  and we have discussed her findings over approximately 70 minutes.  This led to baseline studies including a sed rate of 6, CEA of 1.87, LDH of 134, CMP with BUN/creatinine 15 and 0.58 and follow-up baseline examinations of CT of chest abdomen pelvis October 16 demonstrating her postsurgical scarring left upper lobe, stable 0.6 cm  noncalcified pulmonary nodule left upper lobe with multiple groundglass opacities/nodules seen in the right upper lobe that appears stable, findings in abdomen pelvis with normal spleen, adrenal glands, kidneys, liver, gallbladder and pancreas, potential IUD extending to the myometrium-possible malposition that will need to be followed up.  The patient is seen October 2016 indicates that she feels relatively well.  She is surprised by the IUD question but sees Dr. Annette Parekh concerning this.  We have discussed follow-up that includes chest x-ray at 6 months, low-dose screening chest CT likely yearly.  The patient is next seen April 16, 2021.  She has undergone a follow-up chest x-ray reveals no acute abnormalities.  She is feeling well with no additional changes in her performance status.  We discussed a follow-up low-dose scan scheduled least yearly.  The IUD issue has been addressed, incidentally, by replacement in the interval.      It was elected to have the patient undergo a low-dose CT scan which is now performed 9/24/2021 and which shows numerous bilateral groundglass and mixed density nodules without appreciable change including a mixed density right upper lobe measuring8 millimeters, stable density anterior left upper lobe a 7 mm.  There are postoperative changes from wedge resection left upper lobe with no evidence lymphadenopathy or pleural effusion.      The patient seen 9/28/2021 feeling well with no additional respiratory symptoms.  We, additionally, discussed her COVID-19 vaccination and her previous vaccination was at the beginning of 2021.  Considering the abnormalities currently present in her lungs she could be at significant risk for Covid pneumonia complications should she develop the disease.  We have agreed that she will be checked for her antibody studies now.    The patient proceeded to repeat CT of the chest 9/23/2022 which revealed stable findings and no new abnormalities.  The patient  "is seen back we have discussed her routine follow-up with the above examinations.    The patient is next evaluated 2023.  Recent chest x-ray was negative groundglass pulmonary lesion seen on multiple CT scans not evident.  Unfortunately recent mammogram had suggested an abnormality leading to a diagnostic exam demonstrating a small parallel solid mass with poorly defined margins measuring 8 x 4 x 7 mm in the middle one third of the left breast at 1:00.  There is no ultrasound correlate.     A biopsy was obtained 3/23/2023 that was negative consistent with a benign fibroadenoma.    The patient is seen with her  2023 both indicating that she still has a chronic cough of concern and after some debate a trial of PPI therapy nightly will be initiated to potentially treat silent reflux.  Additionally we do plan to reevaluate her by low-dose chest CT at least on a yearly basis.      Past Medical History:   Diagnosis Date   • Allergic rhinitis    • Anemia     as child   • Anxiety    • Encounter for IUD insertion 2020    MIRENA    • H/O bone density study never   • H/O complete eye exam 2008    dr qureshi   • H/O Lung nodules    • History of hepatitis     AS CHILD   • History of multiple pulmonary nodules    • Hypertension    • Hypertension in pregnancy    • PONV (postoperative nausea and vomiting)    • Seasonal allergies    • Vaginal delivery     08 DR TAN BABY \"KYDWYN\" 6.12        Past Surgical History:   Procedure Laterality Date   •  SECTION  2006 AND    • COLONOSCOPY N/A 2022    Procedure: COLONOSCOPY;  Surgeon: Laly Banegas MD;  Location: [facility] MAIN OR;  Service: Gastroenterology;  Laterality: N/A;  normal   • EAR TUBES      Tympanostomy tube placement as a child multiple times   • THORACOSCOPY Left 2020    Procedure: BRONCHOSCOPY, LEFT VIDEO ASSISTED THORACOSCOPY WITH DAVINCI ROBOT ASSISTED LEFT UPPER LOBE WEDGE RESECTION, LEFT LOWER LOBE WEDGE RESECTION, " RESECTION OF MEDIASTINAL CYST,  INTERCOSTAL NERVE BLOCKS;  Surgeon: Gio Burnett III, MD;  Location: [facility] OR;  Service: [facility];  Laterality: Left;   • TONSILLECTOMY     • TYMPANOPLASTY  1999   • WISDOM TOOTH EXTRACTION  1999        Current Outpatient Medications on File Prior to Visit   Medication Sig Dispense Refill   • 5-Hydroxytryptophan (5-HTP) 100 MG capsule      • ascorbic acid (VITAMIN C) 1000 MG tablet      • CALCIUM CITRATE-VITAMIN D3 PO Take 1 tablet by mouth Daily. 1200 MG  HOLD FOR SURGERY     • hydrocortisone 2.5 % cream      • levonorgestrel (Mirena, 52 MG,) 20 MCG/24HR IUD 1 each by Intrauterine route 1 (One) Time.     • losartan (COZAAR) 100 MG tablet Take 1 tablet by mouth Daily. 90 tablet 3   • Multiple Vitamins-Minerals (MULTIVITAMIN ADULT PO) Take 1 tablet by mouth Daily. HOLD FOR SURGERY     • Omega-3 Fatty Acids (FISH OIL) 1200 MG capsule capsule Take 1 capsule by mouth Daily. EPA AND 900MG DHA  HOLD FOR SURGERY       No current facility-administered medications on file prior to visit.        ALLERGIES:    Allergies   Allergen Reactions   • Latex Swelling     SKIN REDNESS AND SWELLING        Social History     Socioeconomic History   • Marital status:      Spouse name: Jack Calderón   • Number of children: 2   • Years of education: College   Tobacco Use   • Smoking status: Never   • Smokeless tobacco: Never   Vaping Use   • Vaping Use: Never used   Substance and Sexual Activity   • Alcohol use: Yes     Comment: WEEKLY-WINE OR LIQUOR   • Drug use: Never   • Sexual activity: Defer     Birth control/protection: I.U.D.        Family History   Problem Relation Age of Onset   • ALS Maternal Grandfather    • Hypertension Maternal Grandfather    • Diabetes Maternal Uncle    • Hypertension Maternal Grandmother    • Lung cancer Mother    • HIV Father    • No Known Problems Daughter    • No Known Problems Son    • No Known Problems Paternal Grandmother    • No Known Problems Maternal  "Aunt    • No Known Problems Paternal Aunt    • No Known Problems Paternal Grandfather    • BRCA 1/2 Neg Hx    • Breast cancer Neg Hx    • Colon cancer Neg Hx    • Endometrial cancer Neg Hx    • Ovarian cancer Neg Hx    • Malig Hyperthermia Neg Hx         Review of Systems   Constitutional: Positive for diaphoresis (night sweats). Negative for fatigue.   Respiratory: Positive for cough. Negative for chest tightness, shortness of breath and wheezing.    Gastrointestinal: Positive for constipation. Negative for abdominal pain, diarrhea, nausea and vomiting.   Neurological: Negative for weakness.       Objective     Vitals:    04/04/23 1526   BP: 146/89   Pulse: 78   Resp: 18   Temp: 98.2 °F (36.8 °C)   TempSrc: Temporal   SpO2: 96%   Weight: 76.6 kg (168 lb 14.4 oz)   Height: 154.9 cm (60.98\")   PainSc: 0-No pain     Current Status 4/4/2023   ECOG score 0       Physical Exam  Constitutional:       General: She is not in acute distress.     Appearance: She is normal weight. She is not ill-appearing.   HENT:      Head: Normocephalic and atraumatic.      Nose: Nose normal.      Mouth/Throat:      Mouth: Mucous membranes are moist.      Pharynx: Oropharynx is clear. No oropharyngeal exudate.   Eyes:      Extraocular Movements: Extraocular movements intact.      Conjunctiva/sclera: Conjunctivae normal.      Pupils: Pupils are equal, round, and reactive to light.   Cardiovascular:      Rate and Rhythm: Normal rate and regular rhythm.      Pulses: Normal pulses.      Heart sounds: No murmur heard.    No gallop.   Pulmonary:      Effort: Pulmonary effort is normal. No respiratory distress.      Breath sounds: Normal breath sounds. No wheezing or rales.   Abdominal:      General: Abdomen is flat. Bowel sounds are normal. There is no distension.      Palpations: Abdomen is soft. There is no mass.      Tenderness: There is no abdominal tenderness. There is no guarding.   Musculoskeletal:         General: Normal range of motion. "      Cervical back: Normal range of motion and neck supple.   Skin:     General: Skin is warm and dry.      Findings: No rash.   Neurological:      General: No focal deficit present.      Mental Status: She is alert and oriented to person, place, and time. Mental status is at baseline.   Psychiatric:         Mood and Affect: Mood normal.         Thought Content: Thought content normal.         RECENT LABS:  Hematology WBC   Date Value Ref Range Status   04/04/2023 9.30 3.40 - 10.80 10*3/mm3 Final   09/09/2022 6.98 3.40 - 10.80 10*3/mm3 Final     RBC   Date Value Ref Range Status   04/04/2023 4.38 3.77 - 5.28 10*6/mm3 Final   09/09/2022 4.44 3.77 - 5.28 10*6/mm3 Final     Hemoglobin   Date Value Ref Range Status   04/04/2023 13.3 12.0 - 15.9 g/dL Final     Hematocrit   Date Value Ref Range Status   04/04/2023 37.1 34.0 - 46.6 % Final     Platelets   Date Value Ref Range Status   04/04/2023 424 140 - 450 10*3/mm3 Final      EXAMINATIONS: CT OF THE CHEST  9/24/2021  FINDINGS: Numerous bilateral groundglass and mixed density nodules are  again noted. These are without appreciable change. The mixed density  nodule in the right upper lobe on image 103 measures about 8 mm, which  is stable. Mixed density nodule in the anterior left upper lobe on image  86 measures about 7 mm which is also stable. Redemonstrated are  postoperative changes from wedge resection in the left upper lobe. No  evidence for lymphadenopathy or pleural effusion. Limited imaging of the  upper abdomen is unremarkable. No acute bony abnormality.         IMPRESSION:  Stable bilateral pulmonary nodules. Continued follow-up recommended    CT Chest Without Contrast Diagnostic (09/23/2022 17:09)    FINDINGS: Left upper and lower lobe wedge resections again noted.  Multiple bilateral subcentimeter groundglass and mixed density nodules  are again demonstrated measuring up to 8 mm. These are without  appreciable change. There are mainly located in the upper  lobes. No  appreciable lymphadenopathy. No pleural effusion. Limited imaging of the  upper abdomen is unremarkable. No acute bony abnormality.     IMPRESSION:  Stable lung nodules. Continued follow-up recommended      Assessment & Plan     47-year-old female who recently underwent assessment after dense breast tissue was recognized with a strong family history of breast carcinoma.  She underwent genetic counseling revealing, unexpectedly, evidence of an EGFR mutation  The pathologic variant determined was EGFR (c.2369C>T) ([id number]).  There was also a variant of uncertain significance in a single ALK gene (c.640CT).  Her additional family history includes her mother dying from adenocarcinoma of the lung after developing disease in her late 50s with a long history of tobacco smoking.    The patient was reviewed by pulmonary medicine initially with a chest x-ray that revealed potential granulomatous disease though, thereafter, chest CT demonstrated multiple pulmonary nodules several with groundglass density and others with more solid appearance.  Is also a 3 cm left paraspinal cyst thought to be benign.  Patient was referred to thoracic surgery undergoing a left robot-assisted wedge resection as well as removal of the paraspinal cyst August 21.  Final pathology revealed atypical pneumocyte hyperplasia and her case was brought to thoracic conference for assessment.  A follow-up chest CT is planned in mid October and then additional screening in March of next year.  She is seen with her  September 16 initial consultation.                                                                                       Recent review article from 2019 again describes germline mutations  Estimated EGFR with T790M associated with specific lung cancer syndrome targeted never smokers.  The mutations are rare but EGFR is considered to be a major cancer predisposition gene associated with an estimated 31% risk of lung cancer  non-smoking carriers the mutation itself appears to be weakly oncogenic when associated with the common activating mutation the potential is enhanced.  73% of patients lung cancer in the germline T790 mutation have been found to have a second activity mutation most commonly the L858R mutation.     Such germline mutations are thought to be present in 1% of non-small cell lung cancer cases with a meeting age of diagnosis 40 years, most common histology adenocarcinoma, seemingly more common in [place] rather than [place] and in females and non-smoking status.     The most frequent presentation was bilateral groundglass opacities and pulmonary nodules with an indolent disease course.  Screening selected populations has no benefit but the germline mutation is present 50% of patients with baseline T790 EGFR mutation in pretreatment evaluation leading to the possibility of routine germline genotyping.     The  INHERIT EGFR study from [facility] investigatived families with this variant with 105 participants.  Germline EGFR mutations were found and 63% of patients with EGFR T790M detected at lung cancer diagnosis and in 62% and 44% of first and second-degree relatives of germline carriers respectively.  The meeting age appears to be 57 years of age and 65% of cases are diagnosed at advanced age suggesting an indolent multifocal nodular phase progressed in a lymph node in the remote metastatic disease.     This was discussed with the patient and her  in in detail when seen September 16, 2020.  There is not any indication to treat her though certainly a surveillance assessment schedule is reasonable.  She and her  agree and, at this point, and we proceeded to assess with baseline studies and repeat CT scanning.  Fortunately there is no evidence of any additional or progressive disease in this patient.    The patient return for follow-up chest x-ray April 8, 2021 with no acute process.  The patient clinically  feels well and, incidentally, had her IUD replaced after her previous discussion here in office concerning findings on her CT scans.  She is feeling well with an excellent performance status we discussed a follow-up yearly low-dose CT scan of chest to which she is agreeable.    The patient follow-up chest CT 9/24/2021 without significant change.  Interview 9/28 we plan to reassess regularly but also determine her COVID-19 antibody level.  This level returned at 758.5.    The patient is next evaluated 4/4/2023.  Recent chest x-ray was negative groundglass pulmonary lesion seen on multiple CT scans not evident.  Unfortunately recent mammogram had suggested an abnormality leading to a diagnostic exam demonstrating a small parallel solid mass with poorly defined margins measuring 8 x 4 x 7 mm in the middle one third of the left breast at 1:00.  There is no ultrasound correlate.     A biopsy was obtained 3/23/2023 that was negative consistent with a benign fibroadenoma.    The patient is seen with her  4/4/2023 both indicating that she still has a chronic cough of concern and after some debate a trial of PPI therapy nightly will be initiated to potentially treat silent reflux.  Additionally we do plan to reevaluate her by low-dose chest CT at least on a yearly basis.  After further discussion with the patient and her -      Plan:      *Anticipate subsequent low-dose chest CT yearly which we will schedule now in late September 2023    *1 week follow-up MD, CBC, CMP

## 2023-04-04 NOTE — PROGRESS NOTES
Subjective Patient feeling well, seen with her , chronic cough persists  REASON FOR CONSULTATION: Germline EGFR mutation        REQUESTING PHYSICIAN: Jack Velázquez MD, Gio Burnett MD    History of Present Illness  Actually        The patient is a 47-year-old female non-smoker though with a significant secondhand smoke exposure as well is a family history with her mother dying from adenocarcinoma the lung.  There is, additionally a strong family history of breast carcinoma and the patient was assessed undergoing genetic counseling.  This revealed evidence of an EGFR mutation.   The pathologic variant determined was EGFR (c.2369C>T) ([id number]).  There was also a variant of uncertain significance in a single ALK gene (c.640CT) the ALK gene is associated increased neuroblastoma though most variants of uncertain significance are usually reclassified as benign.     A review of available literature indicates that up to 20% of cases of lung cancer are familial in nature with an increased genetic predisposition greater than the general population.  Perhaps 5 to 10% may be hereditary from a pathogenic variant in the single gene that may increase the lifetime risk of lung cancer and places first-degree relatives at a 50% risk of being simply affected unless they have not inherited the pathogenic variant.  The most common gene associated with hereditary susceptibility is EGFR with T790M representing the most common variant.  These may represent up to 1.51% of lung cancer with a greater association in non-smokers, early onset in age, often described more commonly in females and over half of cases reveal a pathogenic variant in EGFR in the lung cancer specimen found in the individual who also has a germline mutation gene.     Reports indicate that individual with a pathogenic variant the risk of lung cancer may be as high as 31% and there were no clear surveillance guidelines for infecting individual with his  pathogenic variant has never had lung cancer.  This patient underwent a screening CAT scan of the chest after assessment by pulmonary medicine July 14, 2020.  This revealed multiple pulmonary nodules within both lung fields some with groundglass density and others more solid in appearance.  The largest was in the left upper lobe measuring 7 mm with no evidence of lymphadenopathy.  There is also a 3 cm left paraspinal cyst at T5 thought to be benign.     It was determined that the patient be admitted for a left robot-assisted wedge resection of the lung nodules as well as the paraspinal cyst biopsy if indicated.  This was performed August 21, 2020 with the patient going bronchoscopy in the left VAT.  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  The patient's case was discussed in thoracic conference September 8, 2020.  Plans were made for her to be seen via medical oncology and determine how we might proceed for therapeutic options and/or surveillance.  She is met with her  and we have discussed her findings over approximately 70 minutes.  This led to baseline studies including a sed rate of 6, CEA of 1.87, LDH of 134, CMP with BUN/creatinine 15 and 0.58 and follow-up baseline examinations of CT of chest abdomen pelvis October 16 demonstrating her postsurgical scarring left upper lobe, stable 0.6 cm noncalcified pulmonary nodule left upper lobe with multiple groundglass opacities/nodules seen in the right upper lobe that appears stable, findings in abdomen pelvis with normal spleen, adrenal glands, kidneys, liver, gallbladder and pancreas, potential IUD extending to the myometrium-possible malposition that will need to be followed up.  The patient is seen October 2016 indicates that she feels relatively well.  She is surprised by the IUD question but sees Dr. Annette Parekh concerning this.  We have discussed follow-up that includes chest x-ray at 6 months,  low-dose screening chest CT likely yearly.  The patient is next seen April 16, 2021.  She has undergone a follow-up chest x-ray reveals no acute abnormalities.  She is feeling well with no additional changes in her performance status.  We discussed a follow-up low-dose scan scheduled least yearly.  The IUD issue has been addressed, incidentally, by replacement in the interval.      It was elected to have the patient undergo a low-dose CT scan which is now performed 9/24/2021 and which shows numerous bilateral groundglass and mixed density nodules without appreciable change including a mixed density right upper lobe measuring8 millimeters, stable density anterior left upper lobe a 7 mm.  There are postoperative changes from wedge resection left upper lobe with no evidence lymphadenopathy or pleural effusion.      The patient seen 9/28/2021 feeling well with no additional respiratory symptoms.  We, additionally, discussed her COVID-19 vaccination and her previous vaccination was at the beginning of 2021.  Considering the abnormalities currently present in her lungs she could be at significant risk for Covid pneumonia complications should she develop the disease.  We have agreed that she will be checked for her antibody studies now.    The patient proceeded to repeat CT of the chest 9/23/2022 which revealed stable findings and no new abnormalities.  The patient is seen back we have discussed her routine follow-up with the above examinations.    The patient is next evaluated 4/4/2023.  Recent chest x-ray was negative groundglass pulmonary lesion seen on multiple CT scans not evident.  Unfortunately recent mammogram had suggested an abnormality leading to a diagnostic exam demonstrating a small parallel solid mass with poorly defined margins measuring 8 x 4 x 7 mm in the middle one third of the left breast at 1:00.  There is no ultrasound correlate.     A biopsy was obtained 3/23/2023 that was negative consistent with a  "benign fibroadenoma.    The patient is seen with her  2023 both indicating that she still has a chronic cough of concern and after some debate a trial of PPI therapy nightly will be initiated to potentially treat silent reflux.  Additionally we do plan to reevaluate her by low-dose chest CT at least on a yearly basis.      Past Medical History:   Diagnosis Date   • Allergic rhinitis    • Anemia     as child   • Anxiety    • Encounter for IUD insertion 2020    MIRENA    • H/O bone density study never   • H/O complete eye exam 2008    dr qureshi   • H/O Lung nodules    • History of hepatitis     AS CHILD   • History of multiple pulmonary nodules    • Hypertension    • Hypertension in pregnancy    • PONV (postoperative nausea and vomiting)    • Seasonal allergies    • Vaginal delivery     08 DR TAN BABY \"KYDWYN\" 6.12        Past Surgical History:   Procedure Laterality Date   •  SECTION  2006 AND    • COLONOSCOPY N/A 2022    Procedure: COLONOSCOPY;  Surgeon: Laly Banegas MD;  [place] [facility] [place];  Service: Gastroenterology;  Laterality: N/A;  normal   • EAR TUBES      Tympanostomy tube placement as a child multiple times   • THORACOSCOPY Left 2020    Procedure: BRONCHOSCOPY, LEFT VIDEO ASSISTED THORACOSCOPY WITH DAVINCI ROBOT ASSISTED LEFT UPPER LOBE WEDGE RESECTION, LEFT LOWER LOBE WEDGE RESECTION, RESECTION OF MEDIASTINAL CYST,  INTERCOSTAL NERVE BLOCKS;  Surgeon: Gio Burnett III, MD;  Location: [facility] MAIN OR;  Service: [facility];  Laterality: Left;   • TONSILLECTOMY     • TYMPANOPLASTY     • WISDOM TOOTH EXTRACTION          Current Outpatient Medications on File Prior to Visit   Medication Sig Dispense Refill   • 5-Hydroxytryptophan (5-HTP) 100 MG capsule      • ascorbic acid (VITAMIN C) 1000 MG tablet      • CALCIUM CITRATE-VITAMIN D3 PO Take 1 tablet by mouth Daily. 1200 MG  HOLD FOR SURGERY     • hydrocortisone 2.5 % cream      • " levonorgestrel (Mirena, 52 MG,) 20 MCG/24HR IUD 1 each by Intrauterine route 1 (One) Time.     • losartan (COZAAR) 100 MG tablet Take 1 tablet by mouth Daily. 90 tablet 3   • Multiple Vitamins-Minerals (MULTIVITAMIN ADULT PO) Take 1 tablet by mouth Daily. HOLD FOR SURGERY     • Omega-3 Fatty Acids (FISH OIL) 1200 MG capsule capsule Take 1 capsule by mouth Daily. EPA AND 900MG DHA  HOLD FOR SURGERY       No current facility-administered medications on file prior to visit.        ALLERGIES:    Allergies   Allergen Reactions   • Latex Swelling     SKIN REDNESS AND SWELLING        Social History     Socioeconomic History   • Marital status:      Spouse name: Jack Calderón   • Number of children: 2   • Years of education: College   Tobacco Use   • Smoking status: Never   • Smokeless tobacco: Never   Vaping Use   • Vaping Use: Never used   Substance and Sexual Activity   • Alcohol use: Yes     Comment: WEEKLY-WINE OR LIQUOR   • Drug use: Never   • Sexual activity: Defer     Birth control/protection: I.U.D.        Family History   Problem Relation Age of Onset   • ALS Maternal Grandfather    • Hypertension Maternal Grandfather    • Diabetes Maternal Uncle    • Hypertension Maternal Grandmother    • Lung cancer Mother    • HIV Father    • No Known Problems Daughter    • No Known Problems Son    • No Known Problems Paternal Grandmother    • No Known Problems Maternal Aunt    • No Known Problems Paternal Aunt    • No Known Problems Paternal Grandfather    • BRCA 1/2 Neg Hx    • Breast cancer Neg Hx    • Colon cancer Neg Hx    • Endometrial cancer Neg Hx    • Ovarian cancer Neg Hx    • Malig Hyperthermia Neg Hx         Review of Systems   Constitutional: Positive for diaphoresis (night sweats). Negative for fatigue.   Respiratory: Positive for cough. Negative for chest tightness, shortness of breath and wheezing.    Gastrointestinal: Positive for constipation. Negative for abdominal pain, diarrhea, nausea and vomiting.  "  Neurological: Negative for weakness.       Objective     Vitals:    04/04/23 1526   BP: 146/89   Pulse: 78   Resp: 18   Temp: 98.2 °F (36.8 °C)   TempSrc: Temporal   SpO2: 96%   Weight: 76.6 kg (168 lb 14.4 oz)   Height: 154.9 cm (60.98\")   PainSc: 0-No pain     Current Status 4/4/2023   ECOG score 0       Physical Exam  Constitutional:       General: She is not in acute distress.     Appearance: She is normal weight. She is not ill-appearing.   HENT:      Head: Normocephalic and atraumatic.      Nose: Nose normal.      Mouth/Throat:      Mouth: Mucous membranes are moist.      Pharynx: Oropharynx is clear. No oropharyngeal exudate.   Eyes:      Extraocular Movements: Extraocular movements intact.      Conjunctiva/sclera: Conjunctivae normal.      Pupils: Pupils are equal, round, and reactive to light.   Cardiovascular:      Rate and Rhythm: Normal rate and regular rhythm.      Pulses: Normal pulses.      Heart sounds: No murmur heard.    No gallop.   Pulmonary:      Effort: Pulmonary effort is normal. No respiratory distress.      Breath sounds: Normal breath sounds. No wheezing or rales.   Abdominal:      General: Abdomen is flat. Bowel sounds are normal. There is no distension.      Palpations: Abdomen is soft. There is no mass.      Tenderness: There is no abdominal tenderness. There is no guarding.   Musculoskeletal:         General: Normal range of motion.      Cervical back: Normal range of motion and neck supple.   Skin:     General: Skin is warm and dry.      Findings: No rash.   Neurological:      General: No focal deficit present.      Mental Status: She is alert and oriented to person, place, and time. Mental status is at baseline.   Psychiatric:         Mood and Affect: Mood normal.         Thought Content: Thought content normal.         RECENT LABS:  Hematology WBC   Date Value Ref Range Status   04/04/2023 9.30 3.40 - 10.80 10*3/mm3 Final   09/09/2022 6.98 3.40 - 10.80 10*3/mm3 Final     RBC   Date " Value Ref Range Status   04/04/2023 4.38 3.77 - 5.28 10*6/mm3 Final   09/09/2022 4.44 3.77 - 5.28 10*6/mm3 Final     Hemoglobin   Date Value Ref Range Status   04/04/2023 13.3 12.0 - 15.9 g/dL Final     Hematocrit   Date Value Ref Range Status   04/04/2023 37.1 34.0 - 46.6 % Final     Platelets   Date Value Ref Range Status   04/04/2023 424 140 - 450 10*3/mm3 Final      EXAMINATIONS: CT OF THE CHEST  9/24/2021  FINDINGS: Numerous bilateral groundglass and mixed density nodules are  again noted. These are without appreciable change. The mixed density  nodule in the right upper lobe on image 103 measures about 8 mm, which  is stable. Mixed density nodule in the anterior left upper lobe on image  86 measures about 7 mm which is also stable. Redemonstrated are  postoperative changes from wedge resection in the left upper lobe. No  evidence for lymphadenopathy or pleural effusion. Limited imaging of the  upper abdomen is unremarkable. No acute bony abnormality.         IMPRESSION:  Stable bilateral pulmonary nodules. Continued follow-up recommended    CT Chest Without Contrast Diagnostic (09/23/2022 17:09)    FINDINGS: Left upper and lower lobe wedge resections again noted.  Multiple bilateral subcentimeter groundglass and mixed density nodules  are again demonstrated measuring up to 8 mm. These are without  appreciable change. There are mainly located in the upper lobes. No  appreciable lymphadenopathy. No pleural effusion. Limited imaging of the  upper abdomen is unremarkable. No acute bony abnormality.     IMPRESSION:  Stable lung nodules. Continued follow-up recommended      Assessment & Plan     47-year-old female who recently underwent assessment after dense breast tissue was recognized with a strong family history of breast carcinoma.  She underwent genetic counseling revealing, unexpectedly, evidence of an EGFR mutation  The pathologic variant determined was EGFR (c.2369C>T) ([id number]).  There was also a  variant of uncertain significance in a single ALK gene (c.640CT).  Her additional family history includes her mother dying from adenocarcinoma of the lung after developing disease in her late 50s with a long history of tobacco smoking.    The patient was reviewed by pulmonary medicine initially with a chest x-ray that revealed potential granulomatous disease though, thereafter, chest CT demonstrated multiple pulmonary nodules several with groundglass density and others with more solid appearance.  Is also a 3 cm left paraspinal cyst thought to be benign.  Patient was referred to thoracic surgery undergoing a left robot-assisted wedge resection as well as removal of the paraspinal cyst August 21.  Final pathology revealed atypical pneumocyte hyperplasia and her case was brought to thoracic conference for assessment.  A follow-up chest CT is planned in mid October and then additional screening in March of next year.  She is seen with her  September 16 initial consultation.                                                                                       Recent review article from 2019 again describes germline mutations  Estimated EGFR with T790M associated with specific lung cancer syndrome targeted never smokers.  The mutations are rare but EGFR is considered to be a major cancer predisposition gene associated with an estimated 31% risk of lung cancer non-smoking carriers the mutation itself appears to be weakly oncogenic when associated with the common activating mutation the potential is enhanced.  73% of patients lung cancer in the germline T790 mutation have been found to have a second activity mutation most commonly the L858R mutation.     Such germline mutations are thought to be present in 1% of non-small cell lung cancer cases with a meeting age of diagnosis 40 years, most common histology adenocarcinoma, seemingly more common in [place] rather than [place] and in females and non-smoking status.      The most frequent presentation was bilateral groundglass opacities and pulmonary nodules with an indolent disease course.  Screening selected populations has no benefit but the germline mutation is present 50% of patients with baseline T790 EGFR mutation in pretreatment evaluation leading to the possibility of routine germline genotyping.     The  INHERIT EGFR study from [facility] investigatived families with this variant with 105 participants.  Germline EGFR mutations were found and 63% of patients with EGFR T790M detected at lung cancer diagnosis and in 62% and 44% of first and second-degree relatives of germline carriers respectively.  The meeting age appears to be 57 years of age and 65% of cases are diagnosed at advanced age suggesting an indolent multifocal nodular phase progressed in a lymph node in the remote metastatic disease.     This was discussed with the patient and her  in in detail when seen September 16, 2020.  There is not any indication to treat her though certainly a surveillance assessment schedule is reasonable.  She and her  agree and, at this point, and we proceeded to assess with baseline studies and repeat CT scanning.  Fortunately there is no evidence of any additional or progressive disease in this patient.    The patient return for follow-up chest x-ray April 8, 2021 with no acute process.  The patient clinically feels well and, incidentally, had her IUD replaced after her previous discussion here in office concerning findings on her CT scans.  She is feeling well with an excellent performance status we discussed a follow-up yearly low-dose CT scan of chest to which she is agreeable.    The patient follow-up chest CT 9/24/2021 without significant change.  Interview 9/28 we plan to reassess regularly but also determine her COVID-19 antibody level.  This level returned at 758.5.    The patient is next evaluated 4/4/2023.  Recent chest x-ray was negative groundglass  pulmonary lesion seen on multiple CT scans not evident.  Unfortunately recent mammogram had suggested an abnormality leading to a diagnostic exam demonstrating a small parallel solid mass with poorly defined margins measuring 8 x 4 x 7 mm in the middle one third of the left breast at 1:00.  There is no ultrasound correlate.     A biopsy was obtained 3/23/2023 that was negative consistent with a benign fibroadenoma.    The patient is seen with her  4/4/2023 both indicating that she still has a chronic cough of concern and after some debate a trial of PPI therapy nightly will be initiated to potentially treat silent reflux.  Additionally we do plan to reevaluate her by low-dose chest CT at least on a yearly basis.  After further discussion with the patient and her -      Plan:      *Anticipate subsequent low-dose chest CT yearly which we will schedule now in late September 2023    *1 week follow-up MD, CBC, CMP

## 2023-08-10 DIAGNOSIS — I10 ESSENTIAL HYPERTENSION: Chronic | ICD-10-CM

## 2023-08-10 RX ORDER — LOSARTAN POTASSIUM 100 MG/1
TABLET ORAL
Qty: 30 TABLET | Refills: 0 | Status: SHIPPED | OUTPATIENT
Start: 2023-08-10

## 2023-08-23 NOTE — PROGRESS NOTES
Chief Complaint:   Chief Complaint   Patient presents with    Hypertension     Med refill / [facility]    Cough     X 1 year - productive in AM only per pt         Roxy Calvert 47 y.o. female who presents today for Medical Management of the below listed issues. She  has a problem list of   Patient Active Problem List   Diagnosis    Hypertension    Anxiety    Pulmonary nodules/lesions, multiple    Lung nodules    Monoallelic mutation of EGFR gene    Colon cancer screening   .  Since the last visit, She has overall felt well, although she has had a lingering cough for about a year now, somewhat productive first thing in the morning. Started around the time she got Covid. Her Hematologist is watching her lungs (CT/XR).  Denies dyspnea. Tried treating GERD, yet not better. Went to Allergist and placed on a nasal spray w/o help.  she has been compliant with   Current Outpatient Medications:     losartan (COZAAR) 100 MG tablet, Take 1 tablet by mouth Daily., Disp: 90 tablet, Rfl: 3    5-Hydroxytryptophan (5-HTP) 100 MG capsule, , Disp: , Rfl:     CALCIUM CITRATE-VITAMIN D3 PO, Take 1 tablet by mouth Daily. 1200 MG HOLD FOR SURGERY, Disp: , Rfl:     hydrocortisone 2.5 % cream, , Disp: , Rfl:     ketoconazole (Nizoral) 2 % shampoo, Apply  topically to the appropriate area as directed Daily., Disp: 120 mL, Rfl: 11    levonorgestrel (Mirena, 52 MG,) 20 MCG/24HR IUD, 1 each by Intrauterine route 1 (One) Time., Disp: , Rfl:     montelukast (Singulair) 10 MG tablet, Take 1 tablet by mouth Every Night., Disp: 90 tablet, Rfl: 3    Multiple Vitamins-Minerals (MULTIVITAMIN ADULT PO), Take 1 tablet by mouth Daily. HOLD FOR SURGERY, Disp: , Rfl:     Omega-3 Fatty Acids (FISH OIL) 1200 MG capsule capsule, Take 1 capsule by mouth Daily. EPA AND 900MG DHA HOLD FOR SURGERY, Disp: , Rfl: .  She denies medication side effects.    All of the other chronic condition(s) listed above are stable w/o issues.    /70   Pulse 78    "Temp 97.8 øF (36.6 øC) (Oral)   Resp 16   Ht 154.9 cm (60.98\")   Wt 74.4 kg (164 lb)   SpO2 97%   BMI 31.01 kg/mý     Results for orders placed or performed in visit on 04/04/23   CBC Auto Differential    Specimen: Blood   Result Value Ref Range    WBC 9.30 3.40 - 10.80 10*3/mm3    RBC 4.38 3.77 - 5.28 10*6/mm3    Hemoglobin 13.3 12.0 - 15.9 g/dL    Hematocrit 37.1 34.0 - 46.6 %    MCV 84.7 79.0 - 97.0 fL    MCH 30.4 26.6 - 33.0 pg    MCHC 35.8 (H) 31.5 - 35.7 g/dL    RDW 11.9 (L) 12.3 - 15.4 %    RDW-SD 36.5 (L) 37.0 - 54.0 fl    MPV 8.8 6.0 - 12.0 fL    Platelets 424 140 - 450 10*3/mm3    Neutrophil % 54.8 42.7 - 76.0 %    Lymphocyte % 34.9 19.6 - 45.3 %    Monocyte % 7.1 5.0 - 12.0 %    Eosinophil % 2.0 0.3 - 6.2 %    Basophil % 0.9 0.0 - 1.5 %    Immature Grans % 0.3 0.0 - 0.5 %    Neutrophils, Absolute 5.09 1.70 - 7.00 10*3/mm3    Lymphocytes, Absolute 3.25 (H) 0.70 - 3.10 10*3/mm3    Monocytes, Absolute 0.66 0.10 - 0.90 10*3/mm3    Eosinophils, Absolute 0.19 0.00 - 0.40 10*3/mm3    Basophils, Absolute 0.08 0.00 - 0.20 10*3/mm3    Immature Grans, Absolute 0.03 0.00 - 0.05 10*3/mm3    nRBC 0.0 0.0 - 0.2 /100 WBC             The following portions of the patient's history were reviewed and updated as appropriate: allergies, current medications, past family history, past medical history, past social history, past surgical history, and problem list.    Review of Systems   Constitutional:  Negative for activity change, chills and fever.   HENT:  Positive for postnasal drip. Negative for ear pain, rhinorrhea and sore throat.    Respiratory:  Positive for cough. Negative for shortness of breath and wheezing.    Cardiovascular:  Negative for chest pain.   Musculoskeletal:  Negative for myalgias.   Skin:  Negative for rash.   Neurological:  Negative for headaches.   Psychiatric/Behavioral:  Negative for dysphoric mood.      Objective     BMI is >= 30 and <35. (Class 1 Obesity). The following options were offered " after discussion;: exercise counseling/recommendations and nutrition counseling/recommendations       Physical Exam  Constitutional:       General: She is not in acute distress.     Appearance: She is well-developed.   Cardiovascular:      Rate and Rhythm: Normal rate and regular rhythm.   Pulmonary:      Effort: Pulmonary effort is normal.      Breath sounds: Normal breath sounds.   Neurological:      Mental Status: She is alert and oriented to person, place, and time.   Psychiatric:         Behavior: Behavior normal.         Thought Content: Thought content normal.   CT Chest reviewed by me at today's visit (9/22).         Diagnoses and all orders for this visit:    1. Essential hypertension (Primary)  -     losartan (COZAAR) 100 MG tablet; Take 1 tablet by mouth Daily.  Dispense: 90 tablet; Refill: 3  -     Comprehensive metabolic panel  -     Lipid panel  -     CBC and Differential  -     TSH    2. Chronic cough  -     montelukast (Singulair) 10 MG tablet; Take 1 tablet by mouth Every Night.  Dispense: 90 tablet; Refill: 3  -     Cancel: Ambulatory Referral to Allergy  -     Ambulatory Referral to Allergy    3. Seborrheic dermatitis  -     ketoconazole (Nizoral) 2 % shampoo; Apply  topically to the appropriate area as directed Daily.  Dispense: 120 mL; Refill: 11    Discussed elevation of HB at night.

## 2023-08-24 ENCOUNTER — OFFICE VISIT (OUTPATIENT)
Dept: FAMILY MEDICINE CLINIC | Facility: CLINIC | Age: 48
End: 2023-08-24
Payer: COMMERCIAL

## 2023-08-24 VITALS
HEART RATE: 78 BPM | SYSTOLIC BLOOD PRESSURE: 108 MMHG | RESPIRATION RATE: 16 BRPM | TEMPERATURE: 97.8 F | WEIGHT: 164 LBS | BODY MASS INDEX: 30.96 KG/M2 | DIASTOLIC BLOOD PRESSURE: 70 MMHG | HEIGHT: 61 IN | OXYGEN SATURATION: 97 %

## 2023-08-24 DIAGNOSIS — L21.9 SEBORRHEIC DERMATITIS: ICD-10-CM

## 2023-08-24 DIAGNOSIS — I10 ESSENTIAL HYPERTENSION: Primary | Chronic | ICD-10-CM

## 2023-08-24 DIAGNOSIS — R05.3 CHRONIC COUGH: ICD-10-CM

## 2023-08-24 PROCEDURE — 99214 OFFICE O/P EST MOD 30 MIN: CPT | Performed by: FAMILY MEDICINE

## 2023-08-24 RX ORDER — MONTELUKAST SODIUM 10 MG/1
10 TABLET ORAL NIGHTLY
Qty: 90 TABLET | Refills: 3 | Status: SHIPPED | OUTPATIENT
Start: 2023-08-24

## 2023-08-24 RX ORDER — KETOCONAZOLE 20 MG/ML
SHAMPOO TOPICAL DAILY
Qty: 120 ML | Refills: 11 | Status: SHIPPED | OUTPATIENT
Start: 2023-08-24

## 2023-08-24 RX ORDER — LOSARTAN POTASSIUM 100 MG/1
100 TABLET ORAL DAILY
Qty: 90 TABLET | Refills: 3 | Status: SHIPPED | OUTPATIENT
Start: 2023-08-24

## 2023-08-25 LAB
ALBUMIN SERPL-MCNC: 4.3 G/DL (ref 3.5–5.2)
ALBUMIN/GLOB SERPL: 2 G/DL
ALP SERPL-CCNC: 59 U/L (ref 39–117)
ALT SERPL-CCNC: 13 U/L (ref 1–33)
AST SERPL-CCNC: 16 U/L (ref 1–32)
BASOPHILS # BLD AUTO: 0.07 10*3/MM3 (ref 0–0.2)
BASOPHILS NFR BLD AUTO: 0.8 % (ref 0–1.5)
BILIRUB SERPL-MCNC: 0.5 MG/DL (ref 0–1.2)
BUN SERPL-MCNC: 10 MG/DL (ref 6–20)
BUN/CREAT SERPL: 14.9 (ref 7–25)
CALCIUM SERPL-MCNC: 9.6 MG/DL (ref 8.6–10.5)
CHLORIDE SERPL-SCNC: 104 MMOL/L (ref 98–107)
CHOLEST SERPL-MCNC: 160 MG/DL (ref 0–200)
CO2 SERPL-SCNC: 27.6 MMOL/L (ref 22–29)
CREAT SERPL-MCNC: 0.67 MG/DL (ref 0.57–1)
EGFRCR SERPLBLD CKD-EPI 2021: 109.3 ML/MIN/1.73
EOSINOPHIL # BLD AUTO: 0.12 10*3/MM3 (ref 0–0.4)
EOSINOPHIL NFR BLD AUTO: 1.4 % (ref 0.3–6.2)
ERYTHROCYTE [DISTWIDTH] IN BLOOD BY AUTOMATED COUNT: 12.1 % (ref 12.3–15.4)
GLOBULIN SER CALC-MCNC: 2.1 GM/DL
GLUCOSE SERPL-MCNC: 94 MG/DL (ref 65–99)
HCT VFR BLD AUTO: 39.2 % (ref 34–46.6)
HDLC SERPL-MCNC: 59 MG/DL (ref 40–60)
HGB BLD-MCNC: 13.3 G/DL (ref 12–15.9)
IMM GRANULOCYTES # BLD AUTO: 0.02 10*3/MM3 (ref 0–0.05)
IMM GRANULOCYTES NFR BLD AUTO: 0.2 % (ref 0–0.5)
LDLC SERPL CALC-MCNC: 88 MG/DL (ref 0–100)
LYMPHOCYTES # BLD AUTO: 2.96 10*3/MM3 (ref 0.7–3.1)
LYMPHOCYTES NFR BLD AUTO: 33.9 % (ref 19.6–45.3)
MCH RBC QN AUTO: 30.1 PG (ref 26.6–33)
MCHC RBC AUTO-ENTMCNC: 33.9 G/DL (ref 31.5–35.7)
MCV RBC AUTO: 88.7 FL (ref 79–97)
MONOCYTES # BLD AUTO: 0.62 10*3/MM3 (ref 0.1–0.9)
MONOCYTES NFR BLD AUTO: 7.1 % (ref 5–12)
NEUTROPHILS # BLD AUTO: 4.94 10*3/MM3 (ref 1.7–7)
NEUTROPHILS NFR BLD AUTO: 56.6 % (ref 42.7–76)
NRBC BLD AUTO-RTO: 0 /100 WBC (ref 0–0.2)
PLATELET # BLD AUTO: 385 10*3/MM3 (ref 140–450)
POTASSIUM SERPL-SCNC: 4.2 MMOL/L (ref 3.5–5.2)
PROT SERPL-MCNC: 6.4 G/DL (ref 6–8.5)
RBC # BLD AUTO: 4.42 10*6/MM3 (ref 3.77–5.28)
SODIUM SERPL-SCNC: 140 MMOL/L (ref 136–145)
TRIGL SERPL-MCNC: 66 MG/DL (ref 0–150)
TSH SERPL DL<=0.005 MIU/L-ACNC: 1.29 UIU/ML (ref 0.27–4.2)
VLDLC SERPL CALC-MCNC: 13 MG/DL (ref 5–40)
WBC # BLD AUTO: 8.73 10*3/MM3 (ref 3.4–10.8)

## 2023-09-13 DIAGNOSIS — I10 ESSENTIAL HYPERTENSION: Chronic | ICD-10-CM

## 2023-09-13 RX ORDER — LOSARTAN POTASSIUM 100 MG/1
100 TABLET ORAL DAILY
Qty: 90 TABLET | Refills: 3 | OUTPATIENT
Start: 2023-09-13

## 2023-09-13 NOTE — TELEPHONE ENCOUNTER
Caller: Roxy Calvert MALLORY    Relationship: Self    Best call back number: [phone]    Requested Prescriptions:   Requested Prescriptions     Pending Prescriptions Disp Refills    losartan (COZAAR) 100 MG tablet 90 tablet 3     Sig: Take 1 tablet by mouth Daily.        Pharmacy where request should be sent: [facility] [id number] [place] [address] - [phone] PH - [phone] FX     Last office visit with prescribing clinician: 8/24/2023   Last telemedicine visit with prescribing clinician: Visit date not found   Next office visit with prescribing clinician: Visit date not found     Additional details provided by patient: SHE WOULD ALSO LIKE GET  AMEPRIZOL 20MG  PRESCRIBED PLEASE    Does the patient have less than a 3 day supply:  [x] Yes  [] No    Would you like a call back once the refill request has been completed: [] Yes [x] No    If the office needs to give you a call back, can they leave a voicemail: [] Yes [x] No    Camilla Perkins Rep   09/13/23 15:44 EDT

## 2023-09-22 ENCOUNTER — APPOINTMENT (OUTPATIENT)
Dept: WOMENS IMAGING | Facility: HOSPITAL | Age: 48
End: 2023-09-22
Payer: COMMERCIAL

## 2023-09-22 PROCEDURE — 77065 DX MAMMO INCL CAD UNI: CPT | Performed by: RADIOLOGY

## 2023-09-22 PROCEDURE — 76642 ULTRASOUND BREAST LIMITED: CPT | Performed by: RADIOLOGY

## 2023-09-22 PROCEDURE — 77061 BREAST TOMOSYNTHESIS UNI: CPT | Performed by: RADIOLOGY

## 2023-09-22 PROCEDURE — G0279 TOMOSYNTHESIS, MAMMO: HCPCS | Performed by: RADIOLOGY

## 2023-09-25 ENCOUNTER — TELEPHONE (OUTPATIENT)
Dept: OBSTETRICS AND GYNECOLOGY | Age: 48
End: 2023-09-25

## 2023-09-25 DIAGNOSIS — R92.8 ABNORMAL MAMMOGRAM: Primary | ICD-10-CM

## 2023-09-25 NOTE — TELEPHONE ENCOUNTER
Patient had a Left DX mammogram and US done on 9/22/23/. Results in chart.    They are recommending a Left US guided bx,    Please review results  Thanks

## 2023-09-29 ENCOUNTER — APPOINTMENT (OUTPATIENT)
Dept: WOMENS IMAGING | Facility: HOSPITAL | Age: 48
End: 2023-09-29
Payer: COMMERCIAL

## 2023-09-29 ENCOUNTER — LAB REQUISITION (OUTPATIENT)
Dept: LAB | Facility: HOSPITAL | Age: 48
End: 2023-09-29
Payer: COMMERCIAL

## 2023-09-29 DIAGNOSIS — N63.0 UNSPECIFIED LUMP IN UNSPECIFIED BREAST: ICD-10-CM

## 2023-09-29 PROCEDURE — A4648 IMPLANTABLE TISSUE MARKER: HCPCS | Performed by: RADIOLOGY

## 2023-09-29 PROCEDURE — 88305 TISSUE EXAM BY PATHOLOGIST: CPT | Performed by: OBSTETRICS & GYNECOLOGY

## 2023-09-29 PROCEDURE — 19083 BX BREAST 1ST LESION US IMAG: CPT | Performed by: RADIOLOGY

## 2023-10-02 LAB
DX PRELIMINARY: NORMAL
LAB AP CASE REPORT: NORMAL
LAB AP CLINICAL INFORMATION: NORMAL
LAB AP INTRADEPARTMENTAL CONSULT: NORMAL
PATH REPORT.FINAL DX SPEC: NORMAL
PATH REPORT.GROSS SPEC: NORMAL

## 2023-10-05 DIAGNOSIS — R92.8 ABNORMAL MAMMOGRAM: Primary | ICD-10-CM

## 2023-10-12 ENCOUNTER — HOSPITAL ENCOUNTER (OUTPATIENT)
Dept: CT IMAGING | Facility: HOSPITAL | Age: 48
Discharge: HOME | End: 2023-10-12
Payer: COMMERCIAL

## 2023-10-12 DIAGNOSIS — R91.8 LUNG NODULES: ICD-10-CM

## 2023-10-13 ENCOUNTER — TELEPHONE (OUTPATIENT)
Dept: ONCOLOGY | Facility: CLINIC | Age: 48
End: 2023-10-13
Payer: COMMERCIAL

## 2023-10-13 DIAGNOSIS — Z15.09 MONOALLELIC MUTATION OF EGFR GENE: Primary | ICD-10-CM

## 2023-10-13 DIAGNOSIS — Z15.89 MONOALLELIC MUTATION OF EGFR GENE: Primary | ICD-10-CM

## 2023-10-13 NOTE — TELEPHONE ENCOUNTER
Caller: Kailee Calvertfeliciano TEJADA    Relationship: Self    Best call back number: [phone]    What is the best time to reach you: ANY    Who are you requesting to speak with (clinical staff, provider,  specific staff member): CLINICAL     What was the call regarding: ROXY IS CALLING STATES THAT HER CT OF THE CHEST WAS PUT IN WRONG  IT IS SUPPOSE TO BE LOW DOSE CT AND IT DOES NOT SAY THAT, IT ONLY SAYS IT IN THE COMMENTS      ROXY HAS AN APPOINTMENT WITH DR DANGELO ON TUESDAY TO GO OVER RESULTS, SHE NEEDS THE CT PRIOR TO THE APPOINTMENT OR SHE WILL HAVE TO RESCHEDULE THE FOLLOW UP       PLEASE REDO ORDER AND ADVISE

## 2023-10-13 NOTE — TELEPHONE ENCOUNTER
Called the patient back. She wants the order to state low dose CT and per Dr. Quach last note this is what he wanted. I placed the order and explained to her that if she did not have this done today or this weekend that her appt with Dr. Belle would have to be moved. She v/u.

## 2023-10-16 ENCOUNTER — HOSPITAL ENCOUNTER (OUTPATIENT)
Dept: CT IMAGING | Facility: HOSPITAL | Age: 48
Discharge: HOME | End: 2023-10-16
Admitting: INTERNAL MEDICINE
Payer: COMMERCIAL

## 2023-10-16 DIAGNOSIS — Z15.89 MONOALLELIC MUTATION OF EGFR GENE: ICD-10-CM

## 2023-10-16 DIAGNOSIS — Z15.09 MONOALLELIC MUTATION OF EGFR GENE: ICD-10-CM

## 2023-10-16 PROCEDURE — 71271 CT THORAX LUNG CANCER SCR C-: CPT

## 2023-10-16 NOTE — PROGRESS NOTES
Subjective Patient feeling well though frustrated about number of breast biopsies that she has required as well.  REASON FOR CONSULTATION: Germline EGFR mutation        REQUESTING PHYSICIAN: Jack Velázquez MD, Gio Burnett MD    History of Present Illness  Actually        The patient is a 47-year-old female non-smoker though with a significant secondhand smoke exposure as well is a family history with her mother dying from adenocarcinoma the lung.  There is, additionally a strong family history of breast carcinoma and the patient was assessed undergoing genetic counseling.  This revealed evidence of an EGFR mutation.   The pathologic variant determined was EGFR (c.2369C>T) ([id number]).  There was also a variant of uncertain significance in a single ALK gene (c.640CT) the ALK gene is associated increased neuroblastoma though most variants of uncertain significance are usually reclassified as benign.     A review of available literature indicates that up to 20% of cases of lung cancer are familial in nature with an increased genetic predisposition greater than the general population.  Perhaps 5 to 10% may be hereditary from a pathogenic variant in the single gene that may increase the lifetime risk of lung cancer and places first-degree relatives at a 50% risk of being simply affected unless they have not inherited the pathogenic variant.  The most common gene associated with hereditary susceptibility is EGFR with T790M representing the most common variant.  These may represent up to 1.51% of lung cancer with a greater association in non-smokers, early onset in age, often described more commonly in females and over half of cases reveal a pathogenic variant in EGFR in the lung cancer specimen found in the individual who also has a germline mutation gene.     Reports indicate that individual with a pathogenic variant the risk of lung cancer may be as high as 31% and there were no clear surveillance guidelines for  infecting individual with his pathogenic variant has never had lung cancer.  This patient underwent a screening CAT scan of the chest after assessment by pulmonary medicine July 14, 2020.  This revealed multiple pulmonary nodules within both lung fields some with groundglass density and others more solid in appearance.  The largest was in the left upper lobe measuring 7 mm with no evidence of lymphadenopathy.  There is also a 3 cm left paraspinal cyst at T5 thought to be benign.     It was determined that the patient be admitted for a left robot-assisted wedge resection of the lung nodules as well as the paraspinal cyst biopsy if indicated.  This was performed August 21, 2020 with the patient going bronchoscopy in the left VAT.  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  The patient's case was discussed in thoracic conference September 8, 2020.  Plans were made for her to be seen via medical oncology and determine how we might proceed for therapeutic options and/or surveillance.  She is met with her  and we have discussed her findings over approximately 70 minutes.  This led to baseline studies including a sed rate of 6, CEA of 1.87, LDH of 134, CMP with BUN/creatinine 15 and 0.58 and follow-up baseline examinations of CT of chest abdomen pelvis October 16 demonstrating her postsurgical scarring left upper lobe, stable 0.6 cm noncalcified pulmonary nodule left upper lobe with multiple groundglass opacities/nodules seen in the right upper lobe that appears stable, findings in abdomen pelvis with normal spleen, adrenal glands, kidneys, liver, gallbladder and pancreas, potential IUD extending to the myometrium-possible malposition that will need to be followed up.  The patient is seen October 2016 indicates that she feels relatively well.  She is surprised by the IUD question but sees Dr. Annette Parekh concerning this.  We have discussed follow-up that includes  chest x-ray at 6 months, low-dose screening chest CT likely yearly.  The patient is next seen April 16, 2021.  She has undergone a follow-up chest x-ray reveals no acute abnormalities.  She is feeling well with no additional changes in her performance status.  We discussed a follow-up low-dose scan scheduled least yearly.  The IUD issue has been addressed, incidentally, by replacement in the interval.      It was elected to have the patient undergo a low-dose CT scan which is now performed 9/24/2021 and which shows numerous bilateral groundglass and mixed density nodules without appreciable change including a mixed density right upper lobe measuring8 millimeters, stable density anterior left upper lobe a 7 mm.  There are postoperative changes from wedge resection left upper lobe with no evidence lymphadenopathy or pleural effusion.      The patient seen 9/28/2021 feeling well with no additional respiratory symptoms.  We, additionally, discussed her COVID-19 vaccination and her previous vaccination was at the beginning of 2021.  Considering the abnormalities currently present in her lungs she could be at significant risk for Covid pneumonia complications should she develop the disease.  We have agreed that she will be checked for her antibody studies now.    The patient proceeded to repeat CT of the chest 9/23/2022 which revealed stable findings and no new abnormalities.  The patient is seen back we have discussed her routine follow-up with the above examinations.    The patient is next evaluated 4/4/2023.  Recent chest x-ray was negative groundglass pulmonary lesion seen on multiple CT scans not evident.  Unfortunately recent mammogram had suggested an abnormality leading to a diagnostic exam demonstrating a small parallel solid mass with poorly defined margins measuring 8 x 4 x 7 mm in the middle one third of the left breast at 1:00.  There is no ultrasound correlate.     A biopsy was obtained 3/23/2023 that was  "negative consistent with a benign fibroadenoma.    The patient is seen with her  2023 both indicating that she still has a chronic cough of concern and after some debate a trial of PPI therapy nightly will be initiated to potentially treat silent reflux.  Additionally we do plan to reevaluate her by low-dose chest CT at least on a yearly basis.    As result the patient underwent a low-dose chest CT 10/16/2023 which shows no new findings stable as compared to previous.  She is seen 10/17/2023 and fortunately is doing well with less respiratory symptoms in general.  She does discussed her breast biopsies that have been recently obtained (all negative) and that this produces considerable anxiety.  Review of her scans, however, there is no new abnormalities in the breasts otherwise seen as well and this is comforting.      Past Medical History:   Diagnosis Date    Allergic rhinitis     Anemia     as child    Anxiety     Encounter for IUD insertion 2020    MIRENA     H/O bone density study never    H/O complete eye exam 2008    dr qureshi    H/O Lung nodules     History of hepatitis     AS CHILD    History of multiple pulmonary nodules     Hypertension     Hypertension in pregnancy     PONV (postoperative nausea and vomiting)     Seasonal allergies     Vaginal delivery     08 DR TAN BABY \"KYDWYN\" 6.12        Past Surgical History:   Procedure Laterality Date     SECTION  2006 AND     COLONOSCOPY N/A 2022    Procedure: COLONOSCOPY;  Surgeon: Laly Banegas MD;  [place] [facility] [place]  Service: Gastroenterology;  Laterality: N/A;  normal    EAR TUBES      Tympanostomy tube placement as a child multiple times    THORACOSCOPY Left 2020    Procedure: BRONCHOSCOPY, LEFT VIDEO ASSISTED THORACOSCOPY WITH DAVINCI ROBOT ASSISTED LEFT UPPER LOBE WEDGE RESECTION, LEFT LOWER LOBE WEDGE RESECTION, RESECTION OF MEDIASTINAL CYST,  INTERCOSTAL NERVE BLOCKS;  Surgeon: Lex, " Gio MORALES III, MD;  Location: [facility] OR;  Service: [facility];  Laterality: Left;    TONSILLECTOMY      TYMPANOPLASTY  1999    WISDOM TOOTH EXTRACTION  1999        Current Outpatient Medications on File Prior to Visit   Medication Sig Dispense Refill    5-Hydroxytryptophan (5-HTP) 100 MG capsule       CALCIUM CITRATE-VITAMIN D3 PO Take 1 tablet by mouth Daily. 1200 MG  HOLD FOR SURGERY      ciclopirox (LOPROX) 1 % shampoo       hydrocortisone 2.5 % cream       ketoconazole (Nizoral) 2 % shampoo Apply  topically to the appropriate area as directed Daily. 120 mL 11    levonorgestrel (Mirena, 52 MG,) 20 MCG/24HR IUD 1 each by Intrauterine route 1 (One) Time.      losartan (COZAAR) 100 MG tablet Take 1 tablet by mouth Daily. 90 tablet 3    montelukast (Singulair) 10 MG tablet Take 1 tablet by mouth Every Night. 90 tablet 3    Multiple Vitamins-Minerals (MULTIVITAMIN ADULT PO) Take 1 tablet by mouth Daily. HOLD FOR SURGERY      Omega-3 Fatty Acids (FISH OIL) 1200 MG capsule capsule Take 1 capsule by mouth Daily. EPA AND 900MG DHA  HOLD FOR SURGERY       No current facility-administered medications on file prior to visit.        ALLERGIES:    Allergies   Allergen Reactions    Latex Swelling     SKIN REDNESS AND SWELLING        Social History     Socioeconomic History    Marital status:      Spouse name: Jack Calderón    Number of children: 2    Years of education: College   Tobacco Use    Smoking status: Never    Smokeless tobacco: Never   Vaping Use    Vaping Use: Never used   Substance and Sexual Activity    Alcohol use: Yes     Comment: WEEKLY-WINE OR LIQUOR    Drug use: Never    Sexual activity: Defer     Birth control/protection: I.U.D.        Family History   Problem Relation Age of Onset    ALS Maternal Grandfather     Hypertension Maternal Grandfather     Diabetes Maternal Uncle     Hypertension Maternal Grandmother     Lung cancer Mother     HIV Father     No Known Problems Daughter     No Known Problems  "Son     No Known Problems Paternal Grandmother     No Known Problems Maternal Aunt     No Known Problems Paternal Aunt     No Known Problems Paternal Grandfather     BRCA 1/2 Neg Hx     Breast cancer Neg Hx     Colon cancer Neg Hx     Endometrial cancer Neg Hx     Ovarian cancer Neg Hx     Malig Hyperthermia Neg Hx         Review of Systems   Constitutional:  Positive for diaphoresis (night sweats). Negative for fatigue.   Respiratory:  Positive for cough. Negative for chest tightness, shortness of breath and wheezing.    Gastrointestinal:  Positive for constipation. Negative for abdominal pain, diarrhea, nausea and vomiting.   Neurological:  Negative for weakness.       Objective     Vitals:    10/17/23 1610   BP: 122/84   Pulse: 69   Resp: 16   Temp: 98 °F (36.7 °C)   TempSrc: Temporal   SpO2: 99%   Weight: 73.5 kg (162 lb 1.6 oz)   Height: 154.9 cm (60.98\")   PainSc: 0-No pain           10/17/2023     4:10 PM   Current Status   ECOG score 0       Physical Exam  Constitutional:       General: She is not in acute distress.     Appearance: She is normal weight. She is not ill-appearing.   HENT:      Head: Normocephalic and atraumatic.      Nose: Nose normal.      Mouth/Throat:      Mouth: Mucous membranes are moist.      Pharynx: Oropharynx is clear. No oropharyngeal exudate.   Eyes:      Extraocular Movements: Extraocular movements intact.      Conjunctiva/sclera: Conjunctivae normal.      Pupils: Pupils are equal, round, and reactive to light.   Cardiovascular:      Rate and Rhythm: Normal rate and regular rhythm.      Pulses: Normal pulses.      Heart sounds: No murmur heard.     No gallop.   Pulmonary:      Effort: Pulmonary effort is normal. No respiratory distress.      Breath sounds: Normal breath sounds. No wheezing or rales.   Abdominal:      General: Abdomen is flat. Bowel sounds are normal. There is no distension.      Palpations: Abdomen is soft. There is no mass.      Tenderness: There is no abdominal " tenderness. There is no guarding.   Musculoskeletal:         General: Normal range of motion.      Cervical back: Normal range of motion and neck supple.   Skin:     General: Skin is warm and dry.      Findings: No rash.   Neurological:      General: No focal deficit present.      Mental Status: She is alert and oriented to person, place, and time. Mental status is at baseline.   Psychiatric:         Mood and Affect: Mood normal.         Thought Content: Thought content normal.         RECENT LABS:  Hematology WBC   Date Value Ref Range Status   10/17/2023 8.82 3.40 - 10.80 10*3/mm3 Final   08/24/2023 8.73 3.40 - 10.80 10*3/mm3 Final     RBC   Date Value Ref Range Status   10/17/2023 4.73 3.77 - 5.28 10*6/mm3 Final   08/24/2023 4.42 3.77 - 5.28 10*6/mm3 Final     Hemoglobin   Date Value Ref Range Status   10/17/2023 13.9 12.0 - 15.9 g/dL Final     Hematocrit   Date Value Ref Range Status   10/17/2023 41.0 34.0 - 46.6 % Final     Platelets   Date Value Ref Range Status   10/17/2023 437 140 - 450 10*3/mm3 Final      EXAMINATIONS: CT OF THE CHEST  9/24/2021  FINDINGS: Numerous bilateral groundglass and mixed density nodules are  again noted. These are without appreciable change. The mixed density  nodule in the right upper lobe on image 103 measures about 8 mm, which  is stable. Mixed density nodule in the anterior left upper lobe on image  86 measures about 7 mm which is also stable. Redemonstrated are  postoperative changes from wedge resection in the left upper lobe. No  evidence for lymphadenopathy or pleural effusion. Limited imaging of the  upper abdomen is unremarkable. No acute bony abnormality.         IMPRESSION:  Stable bilateral pulmonary nodules. Continued follow-up recommended    CT Chest Without Contrast Diagnostic (09/23/2022 17:09)    FINDINGS: Left upper and lower lobe wedge resections again noted.  Multiple bilateral subcentimeter groundglass and mixed density nodules  are again demonstrated  measuring up to 8 mm. These are without  appreciable change. There are mainly located in the upper lobes. No  appreciable lymphadenopathy. No pleural effusion. Limited imaging of the  upper abdomen is unremarkable. No acute bony abnormality.     IMPRESSION:  Stable lung nodules. Continued follow-up recommended    CT Chest Low Dose Cancer Screening WO (10/16/2023 14:59)        IMPRESSION:  1. Stable examination. Given the stability since 09/23/2022 the  Lung-RADS Category is 2. Screening low-dose chest CT is recommended in  12 months.    Assessment & Plan     47-year-old female who recently underwent assessment after dense breast tissue was recognized with a strong family history of breast carcinoma.  She underwent genetic counseling revealing, unexpectedly, evidence of an EGFR mutation  The pathologic variant determined was EGFR (c.2369C>T) ([id number]).  There was also a variant of uncertain significance in a single ALK gene (c.640CT).  Her additional family history includes her mother dying from adenocarcinoma of the lung after developing disease in her late 50s with a long history of tobacco smoking.    The patient was reviewed by pulmonary medicine initially with a chest x-ray that revealed potential granulomatous disease though, thereafter, chest CT demonstrated multiple pulmonary nodules several with groundglass density and others with more solid appearance.  Is also a 3 cm left paraspinal cyst thought to be benign.  Patient was referred to thoracic surgery undergoing a left robot-assisted wedge resection as well as removal of the paraspinal cyst August 21.  Final pathology revealed atypical pneumocyte hyperplasia and her case was brought to thoracic conference for assessment.  A follow-up chest CT is planned in mid October and then additional screening in March of next year.  She is seen with her  September 16 initial consultation.                                                                                        Recent review article from 2019 again describes germline mutations  Estimated EGFR with T790M associated with specific lung cancer syndrome targeted never smokers.  The mutations are rare but EGFR is considered to be a major cancer predisposition gene associated with an estimated 31% risk of lung cancer non-smoking carriers the mutation itself appears to be weakly oncogenic when associated with the common activating mutation the potential is enhanced.  73% of patients lung cancer in the germline T790 mutation have been found to have a second activity mutation most commonly the L858R mutation.     Such germline mutations are thought to be present in 1% of non-small cell lung cancer cases with a meeting age of diagnosis 40 years, most common histology adenocarcinoma, seemingly more common in [place] rather than [place] and in females and non-smoking status.     The most frequent presentation was bilateral groundglass opacities and pulmonary nodules with an indolent disease course.  Screening selected populations has no benefit but the germline mutation is present 50% of patients with baseline T790 EGFR mutation in pretreatment evaluation leading to the possibility of routine germline genotyping.     The  INHERIT EGFR study from [facility] investigatived families with this variant with 105 participants.  Germline EGFR mutations were found and 63% of patients with EGFR T790M detected at lung cancer diagnosis and in 62% and 44% of first and second-degree relatives of germline carriers respectively.  The meeting age appears to be 57 years of age and 65% of cases are diagnosed at advanced age suggesting an indolent multifocal nodular phase progressed in a lymph node in the remote metastatic disease.     This was discussed with the patient and her  in in detail when seen September 16, 2020.  There is not any indication to treat her though certainly a surveillance assessment schedule is reasonable.   She and her  agree and, at this point, and we proceeded to assess with baseline studies and repeat CT scanning.  Fortunately there is no evidence of any additional or progressive disease in this patient.    The patient return for follow-up chest x-ray April 8, 2021 with no acute process.  The patient clinically feels well and, incidentally, had her IUD replaced after her previous discussion here in office concerning findings on her CT scans.  She is feeling well with an excellent performance status we discussed a follow-up yearly low-dose CT scan of chest to which she is agreeable.    The patient follow-up chest CT 9/24/2021 without significant change.  Interview 9/28 we plan to reassess regularly but also determine her COVID-19 antibody level.  This level returned at 758.5.    The patient is next evaluated 4/4/2023.  Recent chest x-ray was negative groundglass pulmonary lesion seen on multiple CT scans not evident.  Unfortunately recent mammogram had suggested an abnormality leading to a diagnostic exam demonstrating a small parallel solid mass with poorly defined margins measuring 8 x 4 x 7 mm in the middle one third of the left breast at 1:00.  There is no ultrasound correlate.     A biopsy was obtained 3/23/2023 that was negative consistent with a benign fibroadenoma.    The patient is seen with her  4/4/2023 both indicating that she still has a chronic cough of concern and after some debate a trial of PPI therapy nightly will be initiated to potentially treat silent reflux.  Additionally we do plan to reevaluate her by low-dose chest CT at least on a yearly basis.  After further discussion with the patient and her  we anticipated subsequent low-dose chest CT yearly which we will schedule now in late September 2023    Her subsequent examinations were negative with a low-dose chest CT 10/13/2023.  In follow-up visit 10/17 we discussed that she could well be a candidate for Guardant Reveal  examinations or similar technologies in the next several years we will ask for a low-dose chest CT in 1 year follow-up at this point.    *51 week follow-up MD, CBC, CMP, low-dose chest CT    *52 weeks MD, consider Guardant Reveal or similar thereafter.

## 2023-10-17 ENCOUNTER — OFFICE VISIT (OUTPATIENT)
Dept: ONCOLOGY | Facility: CLINIC | Age: 48
End: 2023-10-17
Payer: COMMERCIAL

## 2023-10-17 ENCOUNTER — LAB (OUTPATIENT)
Dept: OTHER | Facility: HOSPITAL | Age: 48
End: 2023-10-17
Payer: COMMERCIAL

## 2023-10-17 VITALS
HEART RATE: 69 BPM | TEMPERATURE: 98 F | RESPIRATION RATE: 16 BRPM | DIASTOLIC BLOOD PRESSURE: 84 MMHG | OXYGEN SATURATION: 99 % | SYSTOLIC BLOOD PRESSURE: 122 MMHG | HEIGHT: 61 IN | BODY MASS INDEX: 30.61 KG/M2 | WEIGHT: 162.1 LBS

## 2023-10-17 DIAGNOSIS — Z15.89 MONOALLELIC MUTATION OF EGFR GENE: ICD-10-CM

## 2023-10-17 DIAGNOSIS — Z15.09 MONOALLELIC MUTATION OF EGFR GENE: Primary | ICD-10-CM

## 2023-10-17 DIAGNOSIS — R91.8 PULMONARY NODULES/LESIONS, MULTIPLE: ICD-10-CM

## 2023-10-17 DIAGNOSIS — Z15.89 MONOALLELIC MUTATION OF EGFR GENE: Primary | ICD-10-CM

## 2023-10-17 DIAGNOSIS — Z15.09 MONOALLELIC MUTATION OF EGFR GENE: ICD-10-CM

## 2023-10-17 LAB
ALBUMIN SERPL-MCNC: 4.7 G/DL (ref 3.5–5.2)
ALBUMIN/GLOB SERPL: 1.7 G/DL
ALP SERPL-CCNC: 62 U/L (ref 39–117)
ALT SERPL W P-5'-P-CCNC: 12 U/L (ref 1–33)
ANION GAP SERPL CALCULATED.3IONS-SCNC: 10.3 MMOL/L (ref 5–15)
AST SERPL-CCNC: 14 U/L (ref 1–32)
BASOPHILS # BLD AUTO: 0.09 10*3/MM3 (ref 0–0.2)
BASOPHILS NFR BLD AUTO: 1 % (ref 0–1.5)
BILIRUB SERPL-MCNC: 0.7 MG/DL (ref 0–1.2)
BUN SERPL-MCNC: 14 MG/DL (ref 6–20)
BUN/CREAT SERPL: 28 (ref 7–25)
CALCIUM SPEC-SCNC: 10.1 MG/DL (ref 8.6–10.5)
CHLORIDE SERPL-SCNC: 100 MMOL/L (ref 98–107)
CO2 SERPL-SCNC: 26.7 MMOL/L (ref 22–29)
CREAT SERPL-MCNC: 0.5 MG/DL (ref 0.57–1)
DEPRECATED RDW RBC AUTO: 39.3 FL (ref 37–54)
EGFRCR SERPLBLD CKD-EPI 2021: 117.3 ML/MIN/1.73
EOSINOPHIL # BLD AUTO: 0.09 10*3/MM3 (ref 0–0.4)
EOSINOPHIL NFR BLD AUTO: 1 % (ref 0.3–6.2)
ERYTHROCYTE [DISTWIDTH] IN BLOOD BY AUTOMATED COUNT: 12.4 % (ref 12.3–15.4)
GLOBULIN UR ELPH-MCNC: 2.7 GM/DL
GLUCOSE SERPL-MCNC: 103 MG/DL (ref 65–99)
HCT VFR BLD AUTO: 41 % (ref 34–46.6)
HGB BLD-MCNC: 13.9 G/DL (ref 12–15.9)
IMM GRANULOCYTES # BLD AUTO: 0.02 10*3/MM3 (ref 0–0.05)
IMM GRANULOCYTES NFR BLD AUTO: 0.2 % (ref 0–0.5)
LYMPHOCYTES # BLD AUTO: 3.43 10*3/MM3 (ref 0.7–3.1)
LYMPHOCYTES NFR BLD AUTO: 38.9 % (ref 19.6–45.3)
MCH RBC QN AUTO: 29.4 PG (ref 26.6–33)
MCHC RBC AUTO-ENTMCNC: 33.9 G/DL (ref 31.5–35.7)
MCV RBC AUTO: 86.7 FL (ref 79–97)
MONOCYTES # BLD AUTO: 0.56 10*3/MM3 (ref 0.1–0.9)
MONOCYTES NFR BLD AUTO: 6.3 % (ref 5–12)
NEUTROPHILS NFR BLD AUTO: 4.63 10*3/MM3 (ref 1.7–7)
NEUTROPHILS NFR BLD AUTO: 52.6 % (ref 42.7–76)
NRBC BLD AUTO-RTO: 0 /100 WBC (ref 0–0.2)
PLATELET # BLD AUTO: 437 10*3/MM3 (ref 140–450)
PMV BLD AUTO: 8.4 FL (ref 6–12)
POTASSIUM SERPL-SCNC: 4.1 MMOL/L (ref 3.5–5.2)
PROT SERPL-MCNC: 7.4 G/DL (ref 6–8.5)
RBC # BLD AUTO: 4.73 10*6/MM3 (ref 3.77–5.28)
SODIUM SERPL-SCNC: 137 MMOL/L (ref 136–145)
WBC NRBC COR # BLD: 8.82 10*3/MM3 (ref 3.4–10.8)

## 2023-10-17 PROCEDURE — 80053 COMPREHEN METABOLIC PANEL: CPT | Performed by: INTERNAL MEDICINE

## 2023-10-17 PROCEDURE — 36415 COLL VENOUS BLD VENIPUNCTURE: CPT

## 2023-10-17 PROCEDURE — 85025 COMPLETE CBC W/AUTO DIFF WBC: CPT | Performed by: INTERNAL MEDICINE

## 2023-10-17 RX ORDER — CICLOPIROX 1 G/100ML
SHAMPOO TOPICAL
COMMUNITY
Start: 2023-10-03

## 2023-11-16 ENCOUNTER — TRANSCRIBE ORDERS (OUTPATIENT)
Dept: ADMINISTRATIVE | Facility: HOSPITAL | Age: 48
End: 2023-11-16
Payer: COMMERCIAL

## 2023-11-16 DIAGNOSIS — K21.9 CHRONIC GERD: Primary | ICD-10-CM

## 2023-11-16 DIAGNOSIS — T17.908A ASPIRATION INTO AIRWAY, INITIAL ENCOUNTER: Primary | ICD-10-CM

## 2024-01-04 ENCOUNTER — HOSPITAL ENCOUNTER (OUTPATIENT)
Dept: GENERAL RADIOLOGY | Facility: HOSPITAL | Age: 49
Discharge: HOME | End: 2024-01-04
Payer: COMMERCIAL

## 2024-01-04 DIAGNOSIS — T17.908A ASPIRATION INTO AIRWAY, INITIAL ENCOUNTER: ICD-10-CM

## 2024-01-04 DIAGNOSIS — K21.9 CHRONIC GERD: ICD-10-CM

## 2024-01-04 PROCEDURE — 74221 X-RAY XM ESOPHAGUS 2CNTRST: CPT

## 2024-02-19 ENCOUNTER — OFFICE VISIT (OUTPATIENT)
Dept: OBSTETRICS AND GYNECOLOGY | Age: 49
End: 2024-02-19
Payer: COMMERCIAL

## 2024-02-19 VITALS
BODY MASS INDEX: 30.4 KG/M2 | HEIGHT: 61 IN | SYSTOLIC BLOOD PRESSURE: 128 MMHG | WEIGHT: 161 LBS | DIASTOLIC BLOOD PRESSURE: 78 MMHG

## 2024-02-19 DIAGNOSIS — Z01.419 ENCOUNTER FOR GYNECOLOGICAL EXAMINATION WITHOUT ABNORMAL FINDING: Primary | ICD-10-CM

## 2024-02-19 PROCEDURE — 99396 PREV VISIT EST AGE 40-64: CPT | Performed by: OBSTETRICS & GYNECOLOGY

## 2024-02-19 NOTE — PROGRESS NOTES
"Routine Annual Visit    2024    Patient: Roxy Calvert          MR#:[id number]      Chief Complaint   Patient presents with    Gynecologic Exam     Annual Exam - last pap 2/10/22 neg, pt had breast biopsy on 23, colonoscopy 22, pt has mirena IUD inserted 20, pt has no complaints today       History of Present Illness    48 y.o. female  who presents for annual exam.     Patient feels well  She has a Mirena IUD and only has spotting for her.  She is not due for Pap smear  She had a colonoscopy in   She is having some night sweats and some moodiness  We discussed perimenopausal symptoms  She is due for mammogram in April  No other complaints      No LMP recorded. Patient has had an implant.  Obstetric History:  OB History          4    Para   3    Term   2       1    AB        Living   2         SAB        IAB        Ectopic        Molar        Multiple        Live Births   2               Menstrual History:     No LMP recorded. Patient has had an implant.       Sexual History:       ________________________________________  Patient Active Problem List   Diagnosis    Hypertension    Anxiety    Pulmonary nodules/lesions, multiple    Lung nodules    Monoallelic mutation of EGFR gene    Colon cancer screening       Past Medical History:   Diagnosis Date    Allergic rhinitis     Anemia     as child    Anxiety     Encounter for IUD insertion 2020    MIRENA     H/O bone density study never    H/O complete eye exam 2008    dr qureshi    H/O Lung nodules     History of hepatitis     AS CHILD    History of multiple pulmonary nodules     Hypertension     Hypertension in pregnancy     PONV (postoperative nausea and vomiting)     Seasonal allergies     Vaginal delivery     08 DR TAN BABY \"KYDWYN\" 6.12       Past Surgical History:   Procedure Laterality Date     SECTION  2006 AND     COLONOSCOPY N/A 2022    Procedure: COLONOSCOPY;  Surgeon: Makenzie, " "Laly TEJADA MD;  Location: [facility] MAIN OR;  Service: Gastroenterology;  Laterality: N/A;  normal    EAR TUBES      Tympanostomy tube placement as a child multiple times    THORACOSCOPY Left 8/21/2020    Procedure: BRONCHOSCOPY, LEFT VIDEO ASSISTED THORACOSCOPY WITH DAVINCI ROBOT ASSISTED LEFT UPPER LOBE WEDGE RESECTION, LEFT LOWER LOBE WEDGE RESECTION, RESECTION OF MEDIASTINAL CYST,  INTERCOSTAL NERVE BLOCKS;  Surgeon: Gio uBrnett III, MD;  Location[facility] MAIN OR;  [facility];  Laterality: Left;    TONSILLECTOMY      TYMPANOPLASTY  1999    WISDOM TOOTH EXTRACTION  1999       Social History     Tobacco Use   Smoking Status Never   Smokeless Tobacco Never       has a current medication list which includes the following prescription(s): 5-htp, calcium citrate-vitamin d, ciclopirox, hydrocortisone, ketoconazole, mirena (52 mg), losartan, multiple vitamin, and fish oil.  ________________________________________    Current contraception: IUD  History of abnormal Pap smear: no  Family history of Breast cancer: mat aunt  Family history of uterine or ovarian cancer: no  Family History of colon cancer/colon polyps: yes - uncle  History of abnormal mammogram: no      The following portions of the patient's history were reviewed and updated as appropriate: allergies, current medications, past family history, past medical history, past social history, past surgical history, and problem list.    Review of Systems    Pertinent items are noted in HPI.     Objective   Physical Exam    /78   Ht 154.9 cm (61\")   Wt 73 kg (161 lb)   BMI 30.42 kg/m²    BP Readings from Last 3 Encounters:   02/19/24 128/78   11/29/23 122/63   10/17/23 122/84      Wt Readings from Last 3 Encounters:   02/19/24 73 kg (161 lb)   11/29/23 73.5 kg (162 lb)   10/17/23 73.5 kg (162 lb 1.6 oz)      BMI: Estimated body mass index is 30.42 kg/m² as calculated from the following:    Height as of this encounter: 154.9 cm (61\").    Weight as of " this encounter: 73 kg (161 lb).      General:   alert, appears stated age, and cooperative   Abdomen: soft, non-tender, without masses or organomegaly   Breast: inspection negative, no nipple discharge or bleeding, no masses or nodularity palpable   Vulva: normal   Vagina: normal mucosa   Cervix: no cervical motion tenderness, no lesions, and strings present   Uterus: normal size, mobile, and non-tender   Adnexa: no mass, fullness, tenderness     Assessment:    1. Normal annual exam   Assessment     ICD-10-CM ICD-9-CM   1. Encounter for gynecological examination without abnormal finding  Z01.419 V72.31     Plan:    Plan     [x]  Mammogram request made  []  PAP done  []  Labs:   []  GC/Chl/TV  []  DEXA scan   []  Referral for colonoscopy:       Diagnoses and all orders for this visit:    1. Encounter for gynecological examination without abnormal finding (Primary)            Counseling:  --Nutrition: Stressed importance of moderation and caloric balance, stressed fresh fruit and vegetables  --Exercise: Stressed the importance of regular exercise. 3-5 times weekly   - Discussed screening mammogram recommendations.   --Discussed benefits of screening colonoscopy- age 45 unless FH  --Discussed pap smear screening recommendations

## 2024-04-19 ENCOUNTER — APPOINTMENT (OUTPATIENT)
Dept: WOMENS IMAGING | Facility: HOSPITAL | Age: 49
End: 2024-04-19
Payer: COMMERCIAL

## 2024-04-19 PROCEDURE — G0279 TOMOSYNTHESIS, MAMMO: HCPCS | Performed by: RADIOLOGY

## 2024-04-19 PROCEDURE — 77062 BREAST TOMOSYNTHESIS BI: CPT | Performed by: RADIOLOGY

## 2024-04-19 PROCEDURE — 77066 DX MAMMO INCL CAD BI: CPT | Performed by: RADIOLOGY

## 2024-04-26 DIAGNOSIS — Z12.39 ENCOUNTER FOR BREAST CANCER SCREENING USING NON-MAMMOGRAM MODALITY: Primary | ICD-10-CM

## 2024-08-14 ENCOUNTER — TELEPHONE (OUTPATIENT)
Dept: ONCOLOGY | Facility: CLINIC | Age: 49
End: 2024-08-14
Payer: COMMERCIAL

## 2024-08-14 NOTE — TELEPHONE ENCOUNTER
Caller: Roxy Calvert    Relationship: Self    Best call back number: [phone]    What is the best time to reach you: ANYTIME    Who are you requesting to speak with (clinical staff, provider,  specific staff member): CLINICAL    What was the call regarding: PT IS SCHEDULED FOR A CT SCAN ON 10-17 AND A MRI ON 10-24 BY HER PCP, CAN DR DANGELO VIEW THE MRI AND CANCEL THE CT SCAN , OR WILL PT NEED TO COMPLETE THE CT SCAN.  PLEASE ADVISE

## 2024-08-15 NOTE — TELEPHONE ENCOUNTER
Per Dr. Belle:   Unfortunately these exams look for entirely different things.  I do not think the MRI breast exam would suffice for the CT considering her diagnosis.  She should have both.  Thanks, MIKEL     Called the patient to let her know Dr. Quach opinion above and she v/u.

## 2024-08-15 NOTE — TELEPHONE ENCOUNTER
Unfortunately these exams look for entirely different things.  I do not think the MRI breast exam would suffice for the CT considering her diagnosis.  She should have both.  Thanks, MIKEL

## 2024-09-07 DIAGNOSIS — I10 ESSENTIAL HYPERTENSION: Chronic | ICD-10-CM

## 2024-09-08 RX ORDER — LOSARTAN POTASSIUM 100 MG/1
100 TABLET ORAL DAILY
Qty: 30 TABLET | Refills: 0 | Status: SHIPPED | OUTPATIENT
Start: 2024-09-08

## 2024-09-09 PROBLEM — F41.9 ANXIETY: Chronic | Status: ACTIVE | Noted: 2017-07-17

## 2024-09-09 NOTE — PROGRESS NOTES
Subjective   Chief Complaint   Patient presents with    Hypertension     Establish care       History of Present Illness   48 y.o. female presents as a new patient for new patient physical and to establish care; previously followed by Dr. Velázquez.     She has had a chronic non-productive cough since 2021. Initially had imaging; found to have pulmonary nodules even had biopsy (2020) with thoracic--benign. She has annual low dose chest CTs with Dr. Belle who follows her given strong FH cancers and evidence of EGFR mutation after genetic testing.     She has tried numerous OTC cough syrups without relief. She has seen Dr. Jorgensen and Dr. Yosvany Samano. PFTs were normal. No improvement with trial of Trelegy. She tried Singulair years ago without improvement. Allergy testing revealed mil environmental allergies. She takes Allegra daily without improvement. Previously on Flonase but not for a while. Denies wheezing or dyspnea. She had a swallow study earlier to evaluate for aspiration earlier this year ordered by Dr. Wesley which was unrevealing. 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     No tobacco use but was exposed to second hand smoke as a child.     She has never seen GI or had an EGD. Previously trialed Prilosec for months without improvement. She drinks a cup of coffee a day. Clears throat frequently after eating. Only has heartburn after an alcohol beverage prior to bed which has only been 3-4x over the last 10 years.     She wakes up in the morning with a feeling of tightness in her chest lasting only a minute then resolves. Denies chest pain or dyspnea. She walks daily (45-60 minutes)and enjoys hiking. Never has chest ain or dyspnea with exercise but does note that her heart rate gets up in the 110-120s and sometimes as high as 140 with exercise.     Often awakens feeling unrested.  reports she snores. No witnessed apnea. Vein above her right ankle is sore. She was advised by previous PCP  to wear compression stockings which she did intermittently without relief. Mother had varicose veins.     Reports history of anxiety for which she takes 5HTP (for 2Y) though she doesn't feel like it has helped. She does not wish to be on prescription medication for it. She has sen a counselor in the past.      Patient Active Problem List   Diagnosis    Hypertension    Anxiety    Pulmonary nodules/lesions, multiple    Lung nodules    Monoallelic mutation of EGFR gene       Allergies   Allergen Reactions    Latex Swelling     SKIN REDNESS AND SWELLING       Current Outpatient Medications on File Prior to Visit   Medication Sig Dispense Refill    5-Hydroxytryptophan (5-HTP) 100 MG capsule       CALCIUM CITRATE-VITAMIN D3 PO Take 1 tablet by mouth Daily. 1200 MG  HOLD FOR SURGERY      fexofenadine (ALLEGRA) 180 MG tablet Take 1 tablet by mouth Daily.      levonorgestrel (Mirena, 52 MG,) 20 MCG/24HR IUD 1 each by Intrauterine route 1 (One) Time.      losartan (COZAAR) 100 MG tablet TAKE 1 TABLET BY MOUTH EVERY DAY 30 tablet 0    Multiple Vitamins-Minerals (MULTIVITAMIN ADULT PO) Take 1 tablet by mouth Daily. HOLD FOR SURGERY      Omega-3 Fatty Acids (FISH OIL) 1200 MG capsule capsule Take 1 capsule by mouth Daily. EPA AND 900MG DHA  HOLD FOR SURGERY      [DISCONTINUED] hydrocortisone 2.5 % cream       [DISCONTINUED] ketoconazole (Nizoral) 2 % shampoo Apply  topically to the appropriate area as directed Daily. 120 mL 11    [DISCONTINUED] ciclopirox (LOPROX) 1 % shampoo        No current facility-administered medications on file prior to visit.       Past Medical History:   Diagnosis Date    Allergic 2000    Allergic rhinitis     Anemia     as child    Anxiety     Encounter for IUD insertion 11/2020    MIRENA     H/O Lung nodules     History of hepatitis     AS CHILD    History of medical problems 08/2020    Lung Biopsy    History of multiple pulmonary nodules     Hypertension     Hypertension in pregnancy     PONV  "(postoperative nausea and vomiting)     Vaginal delivery     08 DR TAN BABY \"KYDWYN\" 6.12       Family History   Problem Relation Age of Onset    Lung cancer Mother     Osteoporosis Mother             Anxiety disorder Mother     Thyroid disease Mother         Thyroid removed    HIV Father     Hypertension Maternal Grandmother             Coronary artery disease Maternal Grandmother             Osteoporosis Maternal Grandmother             Heart disease Maternal Grandmother          - hx heart attack;  of heart failure    ALS Maternal Grandfather     Hypertension Maternal Grandfather             Coronary artery disease Maternal Grandfather             Coronary artery disease Paternal Grandmother             Heart disease Paternal Grandmother          - heart attack    No Known Problems Paternal Grandfather     No Known Problems Daughter     No Known Problems Son     No Known Problems Maternal Aunt     Breast cancer Maternal Aunt         Great Aunt    Stroke Maternal Aunt             Diabetes Maternal Uncle             Heart attack Maternal Uncle     No Known Problems Paternal Aunt     Colon cancer Paternal Uncle     Diabetes Paternal Uncle             BRCA 1/2 Neg Hx     Endometrial cancer Neg Hx     Ovarian cancer Neg Hx     Malig Hyperthermia Neg Hx        Social History     Socioeconomic History    Marital status:      Spouse name: Jack Calderón    Number of children: 2    Years of education: College   Tobacco Use    Smoking status: Never    Smokeless tobacco: Never   Vaping Use    Vaping status: Never Used   Substance and Sexual Activity    Alcohol use: Yes     Alcohol/week: 2.0 standard drinks of alcohol     Types: 1 Glasses of wine, 1 Drinks containing 0.5 oz of alcohol per week     Comment: 1 glass of wine or 1 mixed drink per week    Drug use: Never    Sexual activity: Yes     Partners: Male     " "Birth control/protection: I.U.D.       Past Surgical History:   Procedure Laterality Date     SECTION  2006 AND 2008    COLONOSCOPY N/A 2022    Procedure: COLONOSCOPY;  Surgeon: Laly Banegas MD;  Location: [facility] MAIN OR;  Service: Gastroenterology;  Laterality: N/A;  normal    EAR TUBES      Tympanostomy tube placement as a child multiple times    THORACOSCOPY Left 2020    Procedure: BRONCHOSCOPY, LEFT VIDEO ASSISTED THORACOSCOPY WITH DAVINCI ROBOT ASSISTED LEFT UPPER LOBE WEDGE RESECTION, LEFT LOWER LOBE WEDGE RESECTION, RESECTION OF MEDIASTINAL CYST,  INTERCOSTAL NERVE BLOCKS;  Surgeon: Gio Burnett III, MD;  Location: [facility] MAIN OR;  Service: [facility];  Laterality: Left;    TONSILLECTOMY      TYMPANOPLASTY      WISDOM TOOTH EXTRACTION         The following portions of the patient's history were reviewed and updated as appropriate: problem list, allergies, current medications, past medical history, past family history, past social history, and past surgical history.    Review of Systems    Immunization History   Administered Date(s) Administered    Influenza, Unspecified 10/21/2021, 10/06/2022    Tdap 2024    flucelvax quad pfs =>4 YRS 10/13/2020, 10/21/2021       Objective   Vitals:    24 1039   BP: 130/84   Pulse: 72   Resp: 14   Temp: 98.1 °F (36.7 °C)   Weight: 76.2 kg (168 lb)   Height: 154.9 cm (60.98\")     Body mass index is 31.76 kg/m².  Physical Exam  Vitals reviewed.   Constitutional:       Appearance: Normal appearance. She is well-developed. She is obese.   HENT:      Head: Normocephalic and atraumatic.      Right Ear: Tympanic membrane, ear canal and external ear normal.      Left Ear: Tympanic membrane, ear canal and external ear normal.      Nose: Nose normal.      Mouth/Throat:      Mouth: Mucous membranes are moist.      Pharynx: Oropharynx is clear. No oropharyngeal exudate or posterior oropharyngeal erythema.   Eyes:      General: No scleral " icterus.     Extraocular Movements: Extraocular movements intact.      Conjunctiva/sclera: Conjunctivae normal.      Pupils: Pupils are equal, round, and reactive to light.   Neck:      Thyroid: No thyromegaly.      Vascular: No carotid bruit.   Cardiovascular:      Rate and Rhythm: Normal rate and regular rhythm.      Heart sounds: Normal heart sounds. No murmur heard.  Pulmonary:      Effort: Pulmonary effort is normal.      Breath sounds: Normal breath sounds.   Abdominal:      General: Bowel sounds are normal. There is no distension.      Palpations: Abdomen is soft.      Tenderness: There is no abdominal tenderness.   Musculoskeletal:      Cervical back: Neck supple.      Comments: Ambulates without assistance; no clubbing, cyanosis or edema.    Lymphadenopathy:      Cervical: No cervical adenopathy.   Skin:     General: Skin is warm and dry.   Neurological:      Mental Status: She is alert.   Psychiatric:         Mood and Affect: Mood normal.         Behavior: Behavior normal.       ECG 12 Lead    Date/Time: 9/13/2024 12:46 PM  Performed by: Jagruti Delatorre APRN    Authorized by: Jagruti Delatorre APRN  Rhythm: sinus rhythm  Rate: normal  BPM: 61  ST Segments: ST segments normal  T Waves: T waves normal  QRS axis: normal    Clinical impression: normal ECG      Assessment & Plan     Diagnoses and all orders for this visit:    1. Chronic cough (Primary)  Comments:  She has had extensive imaging, bronchoscopy with bx, swallow study, allergy testing, PFTs all negative. Start Omeprazole as below and Flonase as directed and demo'd. If no improvement then GI consult as she will need EGD to further evaluate what I suspect is silent reflux.   Orders:  -     omeprazole (priLOSEC) 40 MG capsule; Take 1 capsule by mouth Daily.  Dispense: 90 capsule; Refill: 0    2. Fatigue, unspecified type  Comments:  Labs as below. She is walking daily 45-60 minutes. Notes non-restorative sleep and snores. She will also  need sleep study as below.  Orders:  -     Comprehensive Metabolic Panel  -     CBC & Differential  -     TSH    3. Chest tightness  Comments:  Noted inthe morning when she wakes up along her sternum lasting less than a minute. Never occurs with exercise. EKG looks good.  Orders:  -     ECG 12 Lead  4. Non-restorative sleep  Comments:  Schedule with Michelle Novak.  Orders:  -     Ambulatory Referral to Sleep Medicine    5. Allergic rhinitis, unspecified seasonality, unspecified trigger  Comments:  Restart Flonase; use demo'd.  Orders:  -     fluticasone (FLONASE) 50 MCG/ACT nasal spray; 2 sprays into the nostril(s) as directed by provider Daily.  Dispense: 16 g; Refill: 6    6. Essential hypertension  Comments:  130/84 today despite Losartan 100 mg daily. HBP 3 days/wk and bring with her to follow up visit in 6 weeks at which time will readdress.    7. Anxiety  Comments:  Continues 5 HTP daily but does not feel it is working. Counseling advised.    8. Monoallelic mutation of EGFR gene  Comments:  Annual follow up with Dr. Belle. Considering Guardant Reveal in October.    9. Need for hepatitis C screening test  -     Hepatitis C Antibody    10. Screening for metabolic disorder  -     Lipid Panel    11. Need for Tdap vaccination  -     Tdap Vaccine => 8yo IM (BOOSTRIX/ADACEL)    Records reviewed include previous OV with Dr. Schrader, Dr. Belle, Dr. Velázquez as well as imaging, labs, and CLS.     I spent 65 minutes caring for Roxy on this date of service. This time includes time spent by me in the following activities: preparing for the visit, reviewing tests, performing a medically appropriate examination and/or evaluation, counseling and educating the patient/family/caregiver, documenting information in the medical record, independently interpreting results and communicating that information with the patient/family/caregiver, care coordination, ordering medications, and ordering test(s) time    Return  in about 6 weeks (around 10/25/2024) for 30, Lab B4 FUP, Annual physical.

## 2024-09-13 ENCOUNTER — OFFICE VISIT (OUTPATIENT)
Dept: INTERNAL MEDICINE | Facility: CLINIC | Age: 49
End: 2024-09-13
Payer: COMMERCIAL

## 2024-09-13 VITALS
WEIGHT: 168 LBS | SYSTOLIC BLOOD PRESSURE: 130 MMHG | TEMPERATURE: 98.1 F | HEART RATE: 72 BPM | HEIGHT: 61 IN | BODY MASS INDEX: 31.72 KG/M2 | RESPIRATION RATE: 14 BRPM | DIASTOLIC BLOOD PRESSURE: 84 MMHG

## 2024-09-13 DIAGNOSIS — I10 PRIMARY HYPERTENSION: Chronic | ICD-10-CM

## 2024-09-13 DIAGNOSIS — R53.83 FATIGUE, UNSPECIFIED TYPE: ICD-10-CM

## 2024-09-13 DIAGNOSIS — Z13.228 SCREENING FOR METABOLIC DISORDER: ICD-10-CM

## 2024-09-13 DIAGNOSIS — F41.9 ANXIETY: Chronic | ICD-10-CM

## 2024-09-13 DIAGNOSIS — Z15.09 MONOALLELIC MUTATION OF EGFR GENE: Chronic | ICD-10-CM

## 2024-09-13 DIAGNOSIS — Z23 NEED FOR TDAP VACCINATION: ICD-10-CM

## 2024-09-13 DIAGNOSIS — Z15.89 MONOALLELIC MUTATION OF EGFR GENE: Chronic | ICD-10-CM

## 2024-09-13 DIAGNOSIS — G47.8 NON-RESTORATIVE SLEEP: ICD-10-CM

## 2024-09-13 DIAGNOSIS — J30.9 ALLERGIC RHINITIS, UNSPECIFIED SEASONALITY, UNSPECIFIED TRIGGER: ICD-10-CM

## 2024-09-13 DIAGNOSIS — Z11.59 NEED FOR HEPATITIS C SCREENING TEST: ICD-10-CM

## 2024-09-13 DIAGNOSIS — R05.3 CHRONIC COUGH: Primary | ICD-10-CM

## 2024-09-13 DIAGNOSIS — R07.89 CHEST TIGHTNESS: ICD-10-CM

## 2024-09-13 RX ORDER — OMEPRAZOLE 40 MG/1
40 CAPSULE, DELAYED RELEASE ORAL DAILY
Qty: 90 CAPSULE | Refills: 0 | Status: SHIPPED | OUTPATIENT
Start: 2024-09-13

## 2024-09-13 RX ORDER — FEXOFENADINE HCL 180 MG/1
180 TABLET ORAL DAILY
COMMUNITY

## 2024-09-13 RX ORDER — FLUTICASONE PROPIONATE 50 MCG
2 SPRAY, SUSPENSION (ML) NASAL DAILY
Qty: 16 G | Refills: 6 | Status: SHIPPED | OUTPATIENT
Start: 2024-09-13

## 2024-10-11 ENCOUNTER — PATIENT MESSAGE (OUTPATIENT)
Dept: INTERNAL MEDICINE | Facility: CLINIC | Age: 49
End: 2024-10-11
Payer: COMMERCIAL

## 2024-10-11 DIAGNOSIS — I10 ESSENTIAL HYPERTENSION: Chronic | ICD-10-CM

## 2024-10-11 RX ORDER — LOSARTAN POTASSIUM 100 MG/1
100 TABLET ORAL DAILY
Qty: 30 TABLET | Refills: 0 | OUTPATIENT
Start: 2024-10-11

## 2024-10-14 DIAGNOSIS — Z15.89 MONOALLELIC MUTATION OF EGFR GENE: Primary | ICD-10-CM

## 2024-10-14 DIAGNOSIS — Z15.09 MONOALLELIC MUTATION OF EGFR GENE: Primary | ICD-10-CM

## 2024-10-14 DIAGNOSIS — R91.8 PULMONARY NODULES/LESIONS, MULTIPLE: ICD-10-CM

## 2024-10-14 RX ORDER — LOSARTAN POTASSIUM 100 MG/1
100 TABLET ORAL DAILY
Qty: 90 TABLET | Refills: 0 | Status: SHIPPED | OUTPATIENT
Start: 2024-10-14

## 2024-10-24 ENCOUNTER — HOSPITAL ENCOUNTER (OUTPATIENT)
Dept: CT IMAGING | Facility: HOSPITAL | Age: 49
Discharge: HOME | End: 2024-10-24
Admitting: INTERNAL MEDICINE
Payer: COMMERCIAL

## 2024-10-24 DIAGNOSIS — Z15.09 MONOALLELIC MUTATION OF EGFR GENE: ICD-10-CM

## 2024-10-24 DIAGNOSIS — R91.8 PULMONARY NODULES/LESIONS, MULTIPLE: ICD-10-CM

## 2024-10-24 DIAGNOSIS — Z15.89 MONOALLELIC MUTATION OF EGFR GENE: ICD-10-CM

## 2024-10-24 PROCEDURE — 71271 CT THORAX LUNG CANCER SCR C-: CPT

## 2024-10-28 DIAGNOSIS — Z15.89 MONOALLELIC MUTATION OF EGFR GENE: ICD-10-CM

## 2024-10-28 DIAGNOSIS — Z15.09 MONOALLELIC MUTATION OF EGFR GENE: ICD-10-CM

## 2024-10-28 DIAGNOSIS — R91.8 PULMONARY NODULES/LESIONS, MULTIPLE: ICD-10-CM

## 2024-10-28 NOTE — PROGRESS NOTES
REASON FOR CONSULTATION: Germline EGFR mutation        REQUESTING PHYSICIAN: Jack Velázquez MD, Gio Burnett MD    History of Present Illness  Actually        The patient is a 48-year-old female non-smoker though with a significant secondhand smoke exposure as well is a family history with her mother dying from adenocarcinoma the lung.  There is, additionally a strong family history of breast carcinoma and the patient was assessed undergoing genetic counseling.  This revealed evidence of an EGFR mutation.   The pathologic variant determined was EGFR (c.2369C>T) ([id number]).  There was also a variant of uncertain significance in a single ALK gene (c.640CT) the ALK gene is associated increased neuroblastoma though most variants of uncertain significance are usually reclassified as benign.     A review of available literature indicates that up to 20% of cases of lung cancer are familial in nature with an increased genetic predisposition greater than the general population.  Perhaps 5 to 10% may be hereditary from a pathogenic variant in the single gene that may increase the lifetime risk of lung cancer and places first-degree relatives at a 50% risk of being simply affected unless they have not inherited the pathogenic variant.  The most common gene associated with hereditary susceptibility is EGFR with T790M representing the most common variant.  These may represent up to 1.51% of lung cancer with a greater association in non-smokers, early onset in age, often described more commonly in females and over half of cases reveal a pathogenic variant in EGFR in the lung cancer specimen found in the individual who also has a germline mutation gene.     Reports indicate that individual with a pathogenic variant the risk of lung cancer may be as high as 31% and there were no clear surveillance guidelines for infecting individual with his pathogenic variant has never had lung cancer.  This patient underwent a screening  CAT scan of the chest after assessment by pulmonary medicine July 14, 2020.  This revealed multiple pulmonary nodules within both lung fields some with groundglass density and others more solid in appearance.  The largest was in the left upper lobe measuring 7 mm with no evidence of lymphadenopathy.  There is also a 3 cm left paraspinal cyst at T5 thought to be benign.     It was determined that the patient be admitted for a left robot-assisted wedge resection of the lung nodules as well as the paraspinal cyst biopsy if indicated.  This was performed August 21, 2020 with the patient going bronchoscopy in the left VAT.  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  The patient's case was discussed in thoracic conference September 8, 2020.  Plans were made for her to be seen via medical oncology and determine how we might proceed for therapeutic options and/or surveillance.  She is met with her  and we have discussed her findings over approximately 70 minutes.  This led to baseline studies including a sed rate of 6, CEA of 1.87, LDH of 134, CMP with BUN/creatinine 15 and 0.58 and follow-up baseline examinations of CT of chest abdomen pelvis October 16 demonstrating her postsurgical scarring left upper lobe, stable 0.6 cm noncalcified pulmonary nodule left upper lobe with multiple groundglass opacities/nodules seen in the right upper lobe that appears stable, findings in abdomen pelvis with normal spleen, adrenal glands, kidneys, liver, gallbladder and pancreas, potential IUD extending to the myometrium-possible malposition that will need to be followed up.  The patient is seen October 2016 indicates that she feels relatively well.  She is surprised by the IUD question but sees Dr. Annette Parekh concerning this.  We have discussed follow-up that includes chest x-ray at 6 months, low-dose screening chest CT likely yearly.  The patient is next seen April 16, 2021.   She has undergone a follow-up chest x-ray reveals no acute abnormalities.  She is feeling well with no additional changes in her performance status.  We discussed a follow-up low-dose scan scheduled least yearly.  The IUD issue has been addressed, incidentally, by replacement in the interval.      It was elected to have the patient undergo a low-dose CT scan which is now performed 9/24/2021 and which shows numerous bilateral groundglass and mixed density nodules without appreciable change including a mixed density right upper lobe measuring8 millimeters, stable density anterior left upper lobe a 7 mm.  There are postoperative changes from wedge resection left upper lobe with no evidence lymphadenopathy or pleural effusion.      The patient seen 9/28/2021 feeling well with no additional respiratory symptoms.  We, additionally, discussed her COVID-19 vaccination and her previous vaccination was at the beginning of 2021.  Considering the abnormalities currently present in her lungs she could be at significant risk for Covid pneumonia complications should she develop the disease.  We have agreed that she will be checked for her antibody studies now.    The patient proceeded to repeat CT of the chest 9/23/2022 which revealed stable findings and no new abnormalities.  The patient is seen back we have discussed her routine follow-up with the above examinations.    The patient is next evaluated 4/4/2023.  Recent chest x-ray was negative groundglass pulmonary lesion seen on multiple CT scans not evident.  Unfortunately recent mammogram had suggested an abnormality leading to a diagnostic exam demonstrating a small parallel solid mass with poorly defined margins measuring 8 x 4 x 7 mm in the middle one third of the left breast at 1:00.  There is no ultrasound correlate.     A biopsy was obtained 3/23/2023 that was negative consistent with a benign fibroadenoma.    The patient is seen with her  4/4/2023 both indicating  "that she still has a chronic cough of concern and after some debate a trial of PPI therapy nightly will be initiated to potentially treat silent reflux.  Additionally we do plan to reevaluate her by low-dose chest CT at least on a yearly basis.    As result the patient underwent a low-dose chest CT 10/16/2023 which shows no new findings stable as compared to previous.  She is seen 10/17/2023 and fortunately is doing well with less respiratory symptoms in general.  She does discussed her breast biopsies that have been recently obtained (all negative) and that this produces considerable anxiety.  Review of her scans, however, there is no new abnormalities in the breasts otherwise seen as well and this is comforting.  Patient seen 10/29/2024, low-dose chest CT with postoperative changes, small groundglass nodules again demonstrated all appear stable, index nodule medial right upper lobe 12 mm another nodule right upper lobe measuring 19 mm.    Patient indicates that she is going to have breast MRI done in the next several weeks as result of several previous benign biopsies and hopefully now better imaging after previous assessment for breast cancer risk.  This brings up the possibility of trying to reduce her overall medical radiation risk even with low-dose CT imaging of the chest by using plasma genomic testing such as guardant reveal.  Past Medical History:   Diagnosis Date    Allergic     Allergic rhinitis     Anemia     as child    Anxiety     Encounter for IUD insertion 2020    MIRENA     H/O Lung nodules     History of hepatitis     AS CHILD    History of medical problems 2020    Lung Biopsy    History of multiple pulmonary nodules     Hypertension     Hypertension in pregnancy     PONV (postoperative nausea and vomiting)     Vaginal delivery     08 DR TAN BABY \"SHANNON\" 6.12        Past Surgical History:   Procedure Laterality Date     SECTION  2006 AND     COLONOSCOPY N/A " 1/28/2022    Procedure: COLONOSCOPY;  Surgeon: Laly Banegas MD;  [place] [facility] [place];  Service: Gastroenterology;  Laterality: N/A;  normal    EAR TUBES      Tympanostomy tube placement as a child multiple times    THORACOSCOPY Left 8/21/2020    Procedure: BRONCHOSCOPY, LEFT VIDEO ASSISTED THORACOSCOPY WITH DAVINCI ROBOT ASSISTED LEFT UPPER LOBE WEDGE RESECTION, LEFT LOWER LOBE WEDGE RESECTION, RESECTION OF MEDIASTINAL CYST,  INTERCOSTAL NERVE BLOCKS;  Surgeon: Gio Burnett III, MD;  Location: [facility] MAIN OR;  Service: DaVinci;  Laterality: Left;    TONSILLECTOMY      TYMPANOPLASTY  1999    WISDOM TOOTH EXTRACTION  1999        Current Outpatient Medications on File Prior to Visit   Medication Sig Dispense Refill    CALCIUM CITRATE-VITAMIN D3 PO Take 1 tablet by mouth Daily. 1200 MG  HOLD FOR SURGERY      fexofenadine (ALLEGRA) 180 MG tablet Take 1 tablet by mouth Daily.      fluticasone (FLONASE) 50 MCG/ACT nasal spray 2 sprays into the nostril(s) as directed by provider Daily. 16 g 6    levonorgestrel (Mirena, 52 MG,) 20 MCG/24HR IUD 1 each by Intrauterine route 1 (One) Time.      losartan (COZAAR) 100 MG tablet Take 1 tablet by mouth Daily. 90 tablet 0    Multiple Vitamins-Minerals (MULTIVITAMIN ADULT PO) Take 1 tablet by mouth Daily. HOLD FOR SURGERY      Omega-3 Fatty Acids (FISH OIL) 1200 MG capsule capsule Take 1 capsule by mouth Daily. EPA AND 900MG DHA  HOLD FOR SURGERY      omeprazole (priLOSEC) 40 MG capsule Take 1 capsule by mouth Daily. 90 capsule 0    5-Hydroxytryptophan (5-HTP) 100 MG capsule        No current facility-administered medications on file prior to visit.        ALLERGIES:    Allergies   Allergen Reactions    Latex Swelling     SKIN REDNESS AND SWELLING        Social History     Socioeconomic History    Marital status:      Spouse name: Jack Calderón    Number of children: 2    Years of education: College   Tobacco Use    Smoking status: Never    Smokeless tobacco:  Never   Vaping Use    Vaping status: Never Used   Substance and Sexual Activity    Alcohol use: Yes     Alcohol/week: 2.0 standard drinks of alcohol     Types: 1 Glasses of wine, 1 Drinks containing 0.5 oz of alcohol per week     Comment: 1 glass of wine or 1 mixed drink per week    Drug use: Never    Sexual activity: Yes     Partners: Male     Birth control/protection: I.U.D.        Family History   Problem Relation Age of Onset    Lung cancer Mother     Osteoporosis Mother             Anxiety disorder Mother     Thyroid disease Mother         Thyroid removed    HIV Father     Hypertension Maternal Grandmother             Coronary artery disease Maternal Grandmother             Osteoporosis Maternal Grandmother             Heart disease Maternal Grandmother          - hx heart attack;  of heart failure    ALS Maternal Grandfather     Hypertension Maternal Grandfather             Coronary artery disease Maternal Grandfather             Coronary artery disease Paternal Grandmother             Heart disease Paternal Grandmother          - heart attack    No Known Problems Paternal Grandfather     No Known Problems Daughter     No Known Problems Son     No Known Problems Maternal Aunt     Breast cancer Maternal Aunt         Great Aunt    Stroke Maternal Aunt             Diabetes Maternal Uncle             Heart attack Maternal Uncle     No Known Problems Paternal Aunt     Colon cancer Paternal Uncle     Diabetes Paternal Uncle             BRCA 1/2 Neg Hx     Endometrial cancer Neg Hx     Ovarian cancer Neg Hx     Malig Hyperthermia Neg Hx         Review of Systems   Constitutional:  Positive for diaphoresis (night sweats). Negative for fatigue.   Respiratory:  Positive for cough. Negative for chest tightness, shortness of breath and wheezing.    Gastrointestinal:  Positive for constipation. Negative for abdominal pain,  "diarrhea, nausea and vomiting.   Neurological:  Negative for weakness.       Objective     Vitals:    10/29/24 1612   BP: 119/83   Pulse: 75   Resp: 16   Temp: 98.9 °F (37.2 °C)   TempSrc: Oral   SpO2: 100%   Weight: 76 kg (167 lb 9.6 oz)   Height: 154 cm (60.63\")   PainSc: 0-No pain             10/29/2024     4:14 PM   Current Status   ECOG score 0       Physical Exam  Constitutional:       General: She is not in acute distress.     Appearance: She is normal weight. She is not ill-appearing.   HENT:      Head: Normocephalic and atraumatic.      Nose: Nose normal.      Mouth/Throat:      Mouth: Mucous membranes are moist.      Pharynx: Oropharynx is clear. No oropharyngeal exudate.   Eyes:      Extraocular Movements: Extraocular movements intact.      Conjunctiva/sclera: Conjunctivae normal.      Pupils: Pupils are equal, round, and reactive to light.   Cardiovascular:      Rate and Rhythm: Normal rate and regular rhythm.      Pulses: Normal pulses.      Heart sounds: No murmur heard.     No gallop.   Pulmonary:      Effort: Pulmonary effort is normal. No respiratory distress.      Breath sounds: Normal breath sounds. No wheezing or rales.   Abdominal:      General: Abdomen is flat. Bowel sounds are normal. There is no distension.      Palpations: Abdomen is soft. There is no mass.      Tenderness: There is no abdominal tenderness. There is no guarding.   Musculoskeletal:         General: Normal range of motion.      Cervical back: Normal range of motion and neck supple.   Skin:     General: Skin is warm and dry.      Findings: No rash.   Neurological:      General: No focal deficit present.      Mental Status: She is alert and oriented to person, place, and time. Mental status is at baseline.   Psychiatric:         Mood and Affect: Mood normal.         Thought Content: Thought content normal.         RECENT LABS:  Hematology WBC   Date Value Ref Range Status   10/29/2024 11.60 (H) 3.40 - 10.80 10*3/mm3 Final "   08/24/2023 8.73 3.40 - 10.80 10*3/mm3 Final     RBC   Date Value Ref Range Status   10/29/2024 4.62 3.77 - 5.28 10*6/mm3 Final   08/24/2023 4.42 3.77 - 5.28 10*6/mm3 Final     Hemoglobin   Date Value Ref Range Status   10/29/2024 13.6 12.0 - 15.9 g/dL Final     Hematocrit   Date Value Ref Range Status   10/29/2024 40.3 34.0 - 46.6 % Final     Platelets   Date Value Ref Range Status   10/29/2024 448 140 - 450 10*3/mm3 Final      EXAMINATIONS: CT OF THE CHEST  9/24/2021  FINDINGS: Numerous bilateral groundglass and mixed density nodules are  again noted. These are without appreciable change. The mixed density  nodule in the right upper lobe on image 103 measures about 8 mm, which  is stable. Mixed density nodule in the anterior left upper lobe on image  86 measures about 7 mm which is also stable. Redemonstrated are  postoperative changes from wedge resection in the left upper lobe. No  evidence for lymphadenopathy or pleural effusion. Limited imaging of the  upper abdomen is unremarkable. No acute bony abnormality.         IMPRESSION:  Stable bilateral pulmonary nodules. Continued follow-up recommended    CT Chest Without Contrast Diagnostic (09/23/2022 17:09)    FINDINGS: Left upper and lower lobe wedge resections again noted.  Multiple bilateral subcentimeter groundglass and mixed density nodules  are again demonstrated measuring up to 8 mm. These are without  appreciable change. There are mainly located in the upper lobes. No  appreciable lymphadenopathy. No pleural effusion. Limited imaging of the  upper abdomen is unremarkable. No acute bony abnormality.     IMPRESSION:  Stable lung nodules. Continued follow-up recommended    CT Chest Low Dose Cancer Screening WO (10/16/2023 14:59)        IMPRESSION:  1. Stable examination. Given the stability since 09/23/2022 the  Lung-RADS Category is 2. Screening low-dose chest CT is recommended in  12 months.    Assessment & Plan     48-year-old female who recently  underwent assessment after dense breast tissue was recognized with a strong family history of breast carcinoma.  She underwent genetic counseling revealing, unexpectedly, evidence of an EGFR mutation  The pathologic variant determined was EGFR (c.2369C>T) (p.[id number]).  There was also a variant of uncertain significance in a single ALK gene (c.640CT).  Her additional family history includes her mother dying from adenocarcinoma of the lung after developing disease in her late 50s with a long history of tobacco smoking.    The patient was reviewed by pulmonary medicine initially with a chest x-ray that revealed potential granulomatous disease though, thereafter, chest CT demonstrated multiple pulmonary nodules several with groundglass density and others with more solid appearance.  Is also a 3 cm left paraspinal cyst thought to be benign.  Patient was referred to thoracic surgery undergoing a left robot-assisted wedge resection as well as removal of the paraspinal cyst August 21.  Final pathology revealed atypical pneumocyte hyperplasia and her case was brought to thoracic conference for assessment.  A follow-up chest CT is planned in mid October and then additional screening in March of next year.  She is seen with her  September 16 initial consultation.                                                                                       Recent review article from 2019 again describes germline mutations  Estimated EGFR with T790M associated with specific lung cancer syndrome targeted never smokers.  The mutations are rare but EGFR is considered to be a major cancer predisposition gene associated with an estimated 31% risk of lung cancer non-smoking carriers the mutation itself appears to be weakly oncogenic when associated with the common activating mutation the potential is enhanced.  73% of patients lung cancer in the germline T790 mutation have been found to have a second activity mutation most commonly  the L858R mutation.     Such germline mutations are thought to be present in 1% of non-small cell lung cancer cases with a meeting age of diagnosis 40 years, most common histology adenocarcinoma, seemingly more common in [place] rather than [place] and in females and non-smoking status.     The most frequent presentation was bilateral groundglass opacities and pulmonary nodules with an indolent disease course.  Screening selected populations has no benefit but the germline mutation is present 50% of patients with baseline T790 EGFR mutation in pretreatment evaluation leading to the possibility of routine germline genotyping.     The  INHERIT EGFR study from [facility] investigatived families with this variant with 105 participants.  Germline EGFR mutations were found and 63% of patients with EGFR T790M detected at lung cancer diagnosis and in 62% and 44% of first and second-degree relatives of germline carriers respectively.  The meeting age appears to be 57 years of age and 65% of cases are diagnosed at advanced age suggesting an indolent multifocal nodular phase progressed in a lymph node in the remote metastatic disease.     This was discussed with the patient and her  in in detail when seen September 16, 2020.  There is not any indication to treat her though certainly a surveillance assessment schedule is reasonable.  She and her  agree and, at this point, and we proceeded to assess with baseline studies and repeat CT scanning.  Fortunately there is no evidence of any additional or progressive disease in this patient.    The patient return for follow-up chest x-ray April 8, 2021 with no acute process.  The patient clinically feels well and, incidentally, had her IUD replaced after her previous discussion here in office concerning findings on her CT scans.  She is feeling well with an excellent performance status we discussed a follow-up yearly low-dose CT scan of chest to which she is  agreeable.    The patient follow-up chest CT 9/24/2021 without significant change.  Interview 9/28 we plan to reassess regularly but also determine her COVID-19 antibody level.  This level returned at 758.5.    The patient is next evaluated 4/4/2023.  Recent chest x-ray was negative groundglass pulmonary lesion seen on multiple CT scans not evident.  Unfortunately recent mammogram had suggested an abnormality leading to a diagnostic exam demonstrating a small parallel solid mass with poorly defined margins measuring 8 x 4 x 7 mm in the middle one third of the left breast at 1:00.  There is no ultrasound correlate.     A biopsy was obtained 3/23/2023 that was negative consistent with a benign fibroadenoma.    The patient is seen with her  4/4/2023 both indicating that she still has a chronic cough of concern and after some debate a trial of PPI therapy nightly will be initiated to potentially treat silent reflux.  Additionally we do plan to reevaluate her by low-dose chest CT at least on a yearly basis.  After further discussion with the patient and her  we anticipated subsequent low-dose chest CT yearly which we will schedule now in late September 2023    Her subsequent examinations were negative with a low-dose chest CT 10/13/2023.  In follow-up visit 10/17 we discussed that she could well be a candidate for Guardant Reveal examinations or similar technologies in the next several years we will ask for a low-dose chest CT in 1 year follow-up at this point.    As result the patient underwent a low-dose chest CT 10/16/2023 which shows no new findings stable as compared to previous.  She is seen 10/17/2023 and fortunately is doing well with less respiratory symptoms in general.  She does discussed her breast biopsies that have been recently obtained (all negative) and that this produces considerable anxiety.  Review of her scans, however, there is no new abnormalities in the breasts otherwise seen as well  and this is comforting.  Patient seen 10/29/2024, low-dose chest CT with postoperative changes, small groundglass nodules again demonstrated all appear stable, index nodule medial right upper lobe 12 mm another nodule right upper lobe measuring 19 mm.  These findings are not seriously concerning and the patient seen back 10/29/2024 as we determine how we might proceed with subsequent screening.    Patient indicates that she is going to have breast MRI done in the next several weeks as result of several previous benign biopsies and hopefully now better imaging after previous assessment for breast cancer risk.  This brings up the possibility of trying to reduce her overall medical radiation risk even with low-dose CT imaging of the chest by using plasma genomic testing such as guardant reveal.    Plan:  *49 weeks-CBC, CMP, guardant reveal-lung    *52 weeks MD

## 2024-10-29 ENCOUNTER — LAB (OUTPATIENT)
Dept: OTHER | Facility: HOSPITAL | Age: 49
End: 2024-10-29
Payer: COMMERCIAL

## 2024-10-29 ENCOUNTER — OFFICE VISIT (OUTPATIENT)
Dept: ONCOLOGY | Facility: CLINIC | Age: 49
End: 2024-10-29
Payer: COMMERCIAL

## 2024-10-29 VITALS
OXYGEN SATURATION: 100 % | TEMPERATURE: 98.9 F | HEIGHT: 61 IN | WEIGHT: 167.6 LBS | RESPIRATION RATE: 16 BRPM | HEART RATE: 75 BPM | SYSTOLIC BLOOD PRESSURE: 119 MMHG | DIASTOLIC BLOOD PRESSURE: 83 MMHG | BODY MASS INDEX: 31.64 KG/M2

## 2024-10-29 DIAGNOSIS — Z15.09 MONOALLELIC MUTATION OF EGFR GENE: Primary | ICD-10-CM

## 2024-10-29 DIAGNOSIS — Z15.89 MONOALLELIC MUTATION OF EGFR GENE: Primary | ICD-10-CM

## 2024-10-29 LAB
ALBUMIN SERPL-MCNC: 4.2 G/DL (ref 3.5–5.2)
ALBUMIN/GLOB SERPL: 1.3 G/DL
ALP SERPL-CCNC: 71 U/L (ref 39–117)
ALT SERPL W P-5'-P-CCNC: 16 U/L (ref 1–33)
ANION GAP SERPL CALCULATED.3IONS-SCNC: 9.8 MMOL/L (ref 5–15)
AST SERPL-CCNC: 16 U/L (ref 1–32)
BASOPHILS # BLD AUTO: 0.07 10*3/MM3 (ref 0–0.2)
BASOPHILS NFR BLD AUTO: 0.6 % (ref 0–1.5)
BILIRUB SERPL-MCNC: 0.5 MG/DL (ref 0–1.2)
BUN SERPL-MCNC: 16 MG/DL (ref 6–20)
BUN/CREAT SERPL: 29.1 (ref 7–25)
CALCIUM SPEC-SCNC: 9.3 MG/DL (ref 8.6–10.5)
CHLORIDE SERPL-SCNC: 101 MMOL/L (ref 98–107)
CO2 SERPL-SCNC: 26.2 MMOL/L (ref 22–29)
CREAT SERPL-MCNC: 0.55 MG/DL (ref 0.57–1)
DEPRECATED RDW RBC AUTO: 39 FL (ref 37–54)
EGFRCR SERPLBLD CKD-EPI 2021: 113.9 ML/MIN/1.73
EOSINOPHIL # BLD AUTO: 0.1 10*3/MM3 (ref 0–0.4)
EOSINOPHIL NFR BLD AUTO: 0.9 % (ref 0.3–6.2)
ERYTHROCYTE [DISTWIDTH] IN BLOOD BY AUTOMATED COUNT: 12.2 % (ref 12.3–15.4)
GLOBULIN UR ELPH-MCNC: 3.2 GM/DL
GLUCOSE SERPL-MCNC: 98 MG/DL (ref 65–99)
HCT VFR BLD AUTO: 40.3 % (ref 34–46.6)
HGB BLD-MCNC: 13.6 G/DL (ref 12–15.9)
IMM GRANULOCYTES # BLD AUTO: 0.04 10*3/MM3 (ref 0–0.05)
IMM GRANULOCYTES NFR BLD AUTO: 0.3 % (ref 0–0.5)
LYMPHOCYTES # BLD AUTO: 3.48 10*3/MM3 (ref 0.7–3.1)
LYMPHOCYTES NFR BLD AUTO: 30 % (ref 19.6–45.3)
MCH RBC QN AUTO: 29.4 PG (ref 26.6–33)
MCHC RBC AUTO-ENTMCNC: 33.7 G/DL (ref 31.5–35.7)
MCV RBC AUTO: 87.2 FL (ref 79–97)
MONOCYTES # BLD AUTO: 0.61 10*3/MM3 (ref 0.1–0.9)
MONOCYTES NFR BLD AUTO: 5.3 % (ref 5–12)
NEUTROPHILS NFR BLD AUTO: 62.9 % (ref 42.7–76)
NEUTROPHILS NFR BLD AUTO: 7.3 10*3/MM3 (ref 1.7–7)
NRBC BLD AUTO-RTO: 0 /100 WBC (ref 0–0.2)
PLATELET # BLD AUTO: 448 10*3/MM3 (ref 140–450)
PMV BLD AUTO: 8.2 FL (ref 6–12)
POTASSIUM SERPL-SCNC: 4.3 MMOL/L (ref 3.5–5.2)
PROT SERPL-MCNC: 7.4 G/DL (ref 6–8.5)
RBC # BLD AUTO: 4.62 10*6/MM3 (ref 3.77–5.28)
SODIUM SERPL-SCNC: 137 MMOL/L (ref 136–145)
WBC NRBC COR # BLD AUTO: 11.6 10*3/MM3 (ref 3.4–10.8)

## 2024-10-29 PROCEDURE — 80053 COMPREHEN METABOLIC PANEL: CPT | Performed by: INTERNAL MEDICINE

## 2024-10-29 PROCEDURE — 85025 COMPLETE CBC W/AUTO DIFF WBC: CPT | Performed by: INTERNAL MEDICINE

## 2024-10-29 PROCEDURE — 36415 COLL VENOUS BLD VENIPUNCTURE: CPT

## 2024-10-31 ENCOUNTER — LAB (OUTPATIENT)
Facility: HOSPITAL | Age: 49
End: 2024-10-31
Payer: COMMERCIAL

## 2024-10-31 LAB
ALBUMIN SERPL-MCNC: 3.9 G/DL (ref 3.5–5.2)
ALBUMIN/GLOB SERPL: 1.6 G/DL
ALP SERPL-CCNC: 58 U/L (ref 39–117)
ALT SERPL W P-5'-P-CCNC: 16 U/L (ref 1–33)
ANION GAP SERPL CALCULATED.3IONS-SCNC: 8.3 MMOL/L (ref 5–15)
AST SERPL-CCNC: 17 U/L (ref 1–32)
BASOPHILS # BLD AUTO: 0.08 10*3/MM3 (ref 0–0.2)
BASOPHILS NFR BLD AUTO: 1.1 % (ref 0–1.5)
BILIRUB SERPL-MCNC: 0.6 MG/DL (ref 0–1.2)
BUN SERPL-MCNC: 11 MG/DL (ref 6–20)
BUN/CREAT SERPL: 14.1 (ref 7–25)
CALCIUM SPEC-SCNC: 9.6 MG/DL (ref 8.6–10.5)
CHLORIDE SERPL-SCNC: 106 MMOL/L (ref 98–107)
CHOLEST SERPL-MCNC: 174 MG/DL (ref 0–200)
CO2 SERPL-SCNC: 26.7 MMOL/L (ref 22–29)
CREAT SERPL-MCNC: 0.78 MG/DL (ref 0.57–1)
DEPRECATED RDW RBC AUTO: 40.2 FL (ref 37–54)
EGFRCR SERPLBLD CKD-EPI 2021: 94.4 ML/MIN/1.73
EOSINOPHIL # BLD AUTO: 0.12 10*3/MM3 (ref 0–0.4)
EOSINOPHIL NFR BLD AUTO: 1.7 % (ref 0.3–6.2)
ERYTHROCYTE [DISTWIDTH] IN BLOOD BY AUTOMATED COUNT: 12.4 % (ref 12.3–15.4)
GLOBULIN UR ELPH-MCNC: 2.5 GM/DL
GLUCOSE SERPL-MCNC: 99 MG/DL (ref 65–99)
HCT VFR BLD AUTO: 38.7 % (ref 34–46.6)
HCV AB SER QL: NORMAL
HDLC SERPL-MCNC: 49 MG/DL (ref 40–60)
HGB BLD-MCNC: 13.4 G/DL (ref 12–15.9)
IMM GRANULOCYTES # BLD AUTO: 0.01 10*3/MM3 (ref 0–0.05)
IMM GRANULOCYTES NFR BLD AUTO: 0.1 % (ref 0–0.5)
LDLC SERPL CALC-MCNC: 115 MG/DL (ref 0–100)
LDLC/HDLC SERPL: 2.36 {RATIO}
LYMPHOCYTES # BLD AUTO: 3.07 10*3/MM3 (ref 0.7–3.1)
LYMPHOCYTES NFR BLD AUTO: 42.2 % (ref 19.6–45.3)
MCH RBC QN AUTO: 30.8 PG (ref 26.6–33)
MCHC RBC AUTO-ENTMCNC: 34.6 G/DL (ref 31.5–35.7)
MCV RBC AUTO: 89 FL (ref 79–97)
MONOCYTES # BLD AUTO: 0.56 10*3/MM3 (ref 0.1–0.9)
MONOCYTES NFR BLD AUTO: 7.7 % (ref 5–12)
NEUTROPHILS NFR BLD AUTO: 3.43 10*3/MM3 (ref 1.7–7)
NEUTROPHILS NFR BLD AUTO: 47.2 % (ref 42.7–76)
NRBC BLD AUTO-RTO: 0 /100 WBC (ref 0–0.2)
PLATELET # BLD AUTO: 392 10*3/MM3 (ref 140–450)
PMV BLD AUTO: 8.8 FL (ref 6–12)
POTASSIUM SERPL-SCNC: 4.3 MMOL/L (ref 3.5–5.2)
PROT SERPL-MCNC: 6.4 G/DL (ref 6–8.5)
RBC # BLD AUTO: 4.35 10*6/MM3 (ref 3.77–5.28)
SODIUM SERPL-SCNC: 141 MMOL/L (ref 136–145)
TRIGL SERPL-MCNC: 48 MG/DL (ref 0–150)
TSH SERPL DL<=0.05 MIU/L-ACNC: 1.66 UIU/ML (ref 0.27–4.2)
VLDLC SERPL-MCNC: 10 MG/DL (ref 5–40)
WBC NRBC COR # BLD AUTO: 7.27 10*3/MM3 (ref 3.4–10.8)

## 2024-10-31 PROCEDURE — 80050 GENERAL HEALTH PANEL: CPT | Performed by: NURSE PRACTITIONER

## 2024-10-31 PROCEDURE — 86803 HEPATITIS C AB TEST: CPT | Performed by: NURSE PRACTITIONER

## 2024-10-31 PROCEDURE — 80061 LIPID PANEL: CPT | Performed by: NURSE PRACTITIONER

## 2024-10-31 NOTE — PROGRESS NOTES
Subjective   Chief Complaint   Patient presents with    Hypertension    Cough       History of Present Illness   48 y.o. female presents for follow up regarding chronic cough. Cough is 20-25% better since starting Omeprazole 40 mg daily. She had allergy shots weekly for 2-2.5 yrs which helped and she graduated. Continues Flonase. She has previously been on Singulair in the past. Recent visit with Dr. Belle. She has had extensive imaging, bronchoscopy with bx,swallow study, allergy testing, PFTs all negative.  Annual chest CTs discontinued. Plans on Guardant reveal lung instead. Scheduled with sleep medicine today.     Exercising 300 minutes weekly. Weight down 3#. Making dietary changes a little at a time. Stopped 5HTP. Not seeing a counselor but considering it. P/P upcoming with  with MRI breast later this month.    Patient Active Problem List   Diagnosis    Hypertension    Anxiety    Pulmonary nodules/lesions, multiple    Lung nodules    Monoallelic mutation of EGFR gene       Allergies   Allergen Reactions    Latex Swelling     SKIN REDNESS AND SWELLING       Current Outpatient Medications on File Prior to Visit   Medication Sig Dispense Refill    CALCIUM CITRATE-VITAMIN D3 PO Take 1 tablet by mouth Daily. 1200 MG  HOLD FOR SURGERY      Cholecalciferol (Vitamin D3) 250 MCG (62872 UT) tablet Take 1 tablet by mouth Daily.      fexofenadine (ALLEGRA) 180 MG tablet Take 1 tablet by mouth Daily.      fluticasone (FLONASE) 50 MCG/ACT nasal spray 2 sprays into the nostril(s) as directed by provider Daily. 16 g 6    levonorgestrel (Mirena, 52 MG,) 20 MCG/24HR IUD 1 each by Intrauterine route 1 (One) Time.      losartan (COZAAR) 100 MG tablet Take 1 tablet by mouth Daily. 90 tablet 0    Multiple Vitamins-Minerals (MULTIVITAMIN ADULT PO) Take 1 tablet by mouth Daily. HOLD FOR SURGERY      Omega-3 Fatty Acids (FISH OIL) 1200 MG capsule capsule Take 1 capsule by mouth Daily. EPA AND 900MG DHA  HOLD FOR SURGERY       "[DISCONTINUED] omeprazole (priLOSEC) 40 MG capsule Take 1 capsule by mouth Daily. 90 capsule 0     No current facility-administered medications on file prior to visit.       Past Medical History:   Diagnosis Date    2000    Allergic rhinitis     Anemia     as child    Anxiety     Encounter for IUD insertion 2020    MIRENA     H/O Lung nodules     History of hepatitis     AS CHILD    History of medical problems 2020    Lung Biopsy    History of multiple pulmonary nodules     Hypertension     Hypertension in pregnancy     PONV (postoperative nausea and vomiting)     Vaginal delivery     08 DR TAN BABY \"KYDWYN\" 6.12       Family History   Problem Relation Age of Onset    Lung cancer Mother     Osteoporosis Mother             Anxiety disorder Mother     Thyroid disease Mother         Thyroid removed    HIV Father     Hypertension Maternal Grandmother             Coronary artery disease Maternal Grandmother             Osteoporosis Maternal Grandmother             Heart disease Maternal Grandmother          - hx heart attack;  of heart failure    ALS Maternal Grandfather     Hypertension Maternal Grandfather             Coronary artery disease Maternal Grandfather             Coronary artery disease Paternal Grandmother             Heart disease Paternal Grandmother          - heart attack    No Known Problems Paternal Grandfather     No Known Problems Daughter     No Known Problems Son     No Known Problems Maternal Aunt     Breast cancer Maternal Aunt         Great Aunt    Stroke Maternal Aunt             Diabetes Maternal Uncle             Heart attack Maternal Uncle     No Known Problems Paternal Aunt     Colon cancer Paternal Uncle     Diabetes Paternal Uncle             BRCA 1/2 Neg Hx     Endometrial cancer Neg Hx     Ovarian cancer Neg Hx     Malig Hyperthermia Neg Hx        Social History "     Socioeconomic History    Marital status:      Spouse name: Jack Calderón    Number of children: 2    Years of education: College   Tobacco Use    Smoking status: Never    Smokeless tobacco: Never   Vaping Use    Vaping status: Never Used   Substance and Sexual Activity    Alcohol use: Yes     Alcohol/week: 2.0 standard drinks of alcohol     Types: 1 Glasses of wine, 1 Drinks containing 0.5 oz of alcohol per week     Comment: 1 glass of wine or 1 mixed drink per week    Drug use: Never    Sexual activity: Yes     Partners: Male     Birth control/protection: I.U.D.       Past Surgical History:   Procedure Laterality Date     SECTION  2006 AND     COLONOSCOPY N/A 2022    Procedure: COLONOSCOPY;  Surgeon: Laly Banegas MD;  Location: [facility] MAIN OR;  Service: Gastroenterology;  Laterality: N/A;  normal    COLONOSCOPY      COLONOSCOPY      EAR TUBES      Tympanostomy tube placement as a child multiple times    THORACOSCOPY Left 2020    Procedure: BRONCHOSCOPY, LEFT VIDEO ASSISTED THORACOSCOPY WITH DAVINCI ROBOT ASSISTED LEFT UPPER LOBE WEDGE RESECTION, LEFT LOWER LOBE WEDGE RESECTION, RESECTION OF MEDIASTINAL CYST,  INTERCOSTAL NERVE BLOCKS;  Surgeon: Gio Burnett III, MD;  Location: [facility] MAIN OR;  Service: DaVinci;  Laterality: Left;    TONSILLECTOMY      TYMPANOPLASTY      WISDOM TOOTH EXTRACTION         The following portions of the patient's history were reviewed and updated as appropriate: problem list, allergies, current medications, past medical history, past family history, past social history, and past surgical history.    Review of Systems    Immunization History   Administered Date(s) Administered    Influenza, Unspecified 10/21/2021, 10/06/2022, 10/15/2024    Tdap 2024    flucelvax quad pfs =>4 YRS 10/13/2020, 10/21/2021       Objective   Vitals:    24 0943   BP: 116/82   Pulse: 76   Resp: 14   Temp: 97.5 °F (36.4 °C)   Weight: 74.7  "kg (164 lb 9.6 oz)   Height: 154 cm (60.63\")     Body mass index is 31.48 kg/m².  Physical Exam  Vitals reviewed.   Constitutional:       Appearance: Normal appearance. She is well-developed. She is obese.   HENT:      Head: Normocephalic and atraumatic.      Mouth/Throat:      Mouth: Mucous membranes are moist.      Pharynx: Oropharynx is clear. No oropharyngeal exudate or posterior oropharyngeal erythema.   Cardiovascular:      Rate and Rhythm: Normal rate and regular rhythm.      Heart sounds: Normal heart sounds, S1 normal and S2 normal.   Pulmonary:      Effort: Pulmonary effort is normal.      Breath sounds: Normal breath sounds.   Skin:     General: Skin is warm and dry.   Neurological:      Mental Status: She is alert.   Psychiatric:         Mood and Affect: Mood normal.         Behavior: Behavior normal.     Procedures    Assessment & Plan   Diagnoses and all orders for this visit:    1. Chronic cough (Primary)  Comments:  20-25% improved with Omeprazole 40 mg daily. She has had extensive imaging, bronchoscopy with bx, swallow study, allergy testing, PFTs all negative. Consider adding Singulair or returning to Asthma/Allergy with Dr. Jorgensen to discuss immunotherapy which very much helped her in the past.  Orders:  -     omeprazole (priLOSEC) 40 MG capsule; Take 1 capsule by mouth Daily.  Dispense: 180 capsule; Refill: 0    2. Primary hypertension  Comments:  Controlled. Continue current medications. Recheck at physical in 6W.    3. Anxiety  Comments:  She stopped HTP. Considering counseling. Discuss SSRI in follow up at 6 weeks.    Records reviewed include previous OV with myself and Dr. Belle as well as labs.     Return in about 6 weeks (around 12/16/2024) for Annual physical.           "

## 2024-11-04 ENCOUNTER — OFFICE VISIT (OUTPATIENT)
Dept: INTERNAL MEDICINE | Facility: CLINIC | Age: 49
End: 2024-11-04
Payer: COMMERCIAL

## 2024-11-04 ENCOUNTER — OFFICE VISIT (OUTPATIENT)
Dept: SLEEP MEDICINE | Facility: HOSPITAL | Age: 49
End: 2024-11-04
Payer: COMMERCIAL

## 2024-11-04 VITALS
HEIGHT: 61 IN | HEART RATE: 76 BPM | SYSTOLIC BLOOD PRESSURE: 116 MMHG | DIASTOLIC BLOOD PRESSURE: 82 MMHG | WEIGHT: 164.6 LBS | TEMPERATURE: 97.5 F | RESPIRATION RATE: 14 BRPM | BODY MASS INDEX: 31.08 KG/M2

## 2024-11-04 VITALS
OXYGEN SATURATION: 96 % | HEIGHT: 62 IN | HEART RATE: 78 BPM | WEIGHT: 166 LBS | DIASTOLIC BLOOD PRESSURE: 79 MMHG | SYSTOLIC BLOOD PRESSURE: 120 MMHG | BODY MASS INDEX: 30.55 KG/M2

## 2024-11-04 DIAGNOSIS — R05.3 CHRONIC COUGH: Primary | Chronic | ICD-10-CM

## 2024-11-04 DIAGNOSIS — F41.9 ANXIETY: Chronic | ICD-10-CM

## 2024-11-04 DIAGNOSIS — E66.9 OBESITY (BMI 30-39.9): ICD-10-CM

## 2024-11-04 DIAGNOSIS — G47.8 NON-RESTORATIVE SLEEP: Primary | ICD-10-CM

## 2024-11-04 DIAGNOSIS — G47.10 HYPERSOMNIA: ICD-10-CM

## 2024-11-04 DIAGNOSIS — I10 PRIMARY HYPERTENSION: Chronic | ICD-10-CM

## 2024-11-04 DIAGNOSIS — R06.83 LOUD SNORING: ICD-10-CM

## 2024-11-04 PROCEDURE — G0463 HOSPITAL OUTPT CLINIC VISIT: HCPCS

## 2024-11-04 PROCEDURE — 99214 OFFICE O/P EST MOD 30 MIN: CPT | Performed by: NURSE PRACTITIONER

## 2024-11-04 RX ORDER — OMEPRAZOLE 40 MG/1
40 CAPSULE, DELAYED RELEASE ORAL DAILY
Qty: 180 CAPSULE | Refills: 0 | Status: SHIPPED | OUTPATIENT
Start: 2024-11-04

## 2024-11-04 RX ORDER — MELATONIN 10 MG
1 TABLET, SUBLINGUAL SUBLINGUAL DAILY
COMMUNITY

## 2024-11-04 NOTE — PROGRESS NOTES
[facility]  Telephone: [phone] / Fax: [phone] [place]  Telephone: [phone] / Fax: [phone] Yari Ordaz    Referring Physician: Jagruti Delatorre APRN  PCP: Jagruti Delatorre APRN    Reason for consult:  sleep apnea    Roxy Calvert is a 48 y.o.female  was seen in the [facility] Disorders Center today for evaluation of sleep apnea.     She reports waking up tired, sleepy, has loud snoring and non restorative sleep.  does not report apneas. She works second shift twice per month. Otherwise she works day job. No prior KIMBERLEE evaluation to date has been performed.    She did genetic testing and is doing CT scans through Dr Belle to monitor ground glass lung nodules. Mom  of lung cancer.      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    ROS-+anxiety, rest is negative.      Roxy Calvert  has a past medical history of Allergic (), Allergic rhinitis, Anemia, Anxiety, Encounter for IUD insertion (2020), H/O Lung nodules, History of hepatitis, History of medical problems (2020), History of multiple pulmonary nodules, Hypertension, Hypertension in pregnancy, PONV (postoperative nausea and vomiting), and Vaginal delivery.    Current Medications:    Current Outpatient Medications:     CALCIUM CITRATE-VITAMIN D3 PO, Take 1 tablet by mouth Daily. 1200 MG HOLD FOR SURGERY, Disp: , Rfl:     Cholecalciferol (Vitamin D3) 250 MCG (22491 UT) tablet, Take 1 tablet by mouth Daily., Disp: , Rfl:     fexofenadine (ALLEGRA) 180 MG tablet, Take 1 tablet by mouth Daily., Disp: , Rfl:     fluticasone (FLONASE) 50 MCG/ACT nasal spray, 2 sprays into the nostril(s) as directed by provider Daily., Disp: 16 g, Rfl: 6    levonorgestrel (Mirena, 52 MG,) 20 MCG/24HR IUD, 1 each by Intrauterine route 1 (One) Time., Disp: , Rfl:     losartan (COZAAR) 100 MG tablet, Take 1 tablet by mouth Daily., Disp: 90 tablet, Rfl: 0    Multiple Vitamins-Minerals (MULTIVITAMIN ADULT PO),  "Take 1 tablet by mouth Daily. HOLD FOR SURGERY, Disp: , Rfl:     Omega-3 Fatty Acids (FISH OIL) 1200 MG capsule capsule, Take 1 capsule by mouth Daily. EPA AND 900MG DHA HOLD FOR SURGERY, Disp: , Rfl:     omeprazole (priLOSEC) 40 MG capsule, Take 1 capsule by mouth Daily., Disp: 180 capsule, Rfl: 0    I have reviewed Past Medical History, Past Surgical History, Medication List, Social History and Family History as entered in Sleep Questionnaire and EPIC.    ESS  3   Vital Signs /79   Pulse 78   Ht 156.2 cm (61.5\")   Wt 75.3 kg (166 lb)   SpO2 96%   BMI 30.86 kg/m²  Body mass index is 30.86 kg/m².    General Alert and oriented. No acute distress noted   Pharynx/Throat Class IV  Mallampati airway, large tongue, no evidence of redundant lateral pharyngeal tissue. No oral lesions. No thrush. Moist mucous membranes.   Head Normocephalic. Symmetrical. Atraumatic.    Nose No septal deviation. No drainage   Chest Wall Normal shape. Symmetric expansion with respiration. No tenderness.   Neck Trachea midline, no thyromegaly or adenopathy    Lungs Clear to auscultation bilaterally. No wheezes. No rhonchi. No rales. Respirations regular, even and unlabored.   Heart Regular rhythm and normal rate. Normal S1 and S2. No murmur   Abdomen Soft, non-tender and non-distended. Normal bowel sounds. No masses.   Extremities Moves all extremities well. No edema   Psychiatric Normal mood and affect.        Impression:  1. Non-restorative sleep    2. Hypersomnia    3. Obesity (BMI 30-39.9)    4. Loud snoring          Plan:  I discussed the pathophysiology of obstructive sleep apnea with the patient.  We discussed the adverse outcomes associated with untreated sleep-disordered breathing.  We discussed treatment modalities of obstructive sleep apnea including CPAP device and oral appliance. Sleep study will be scheduled to establish a definitive diagnosis of sleep disorder breathing.      Weight loss will be strongly beneficial in " order to reduce the severity of sleep-disordered breathing.  Patient has narrow oropharyngeal structure.     Caution during activities that require prolonged concentration is strongly advised.  After sleep study results are available, patient will be notified, and appointment will be scheduled to discuss sleep study results and treatment recommendations.        I appreciate the opportunity to participate in this patient's care.      MAGDA Nicole  [facility]  Phone: [phone]      Part of this note may be an electronic transcription/translation of spoken language to printed text using the Dragon Dictation System. Some errors may exist even though the document was edited.

## 2024-11-19 ENCOUNTER — HOSPITAL ENCOUNTER (OUTPATIENT)
Dept: MRI IMAGING | Facility: HOSPITAL | Age: 49
Discharge: HOME | End: 2024-11-19
Admitting: OBSTETRICS & GYNECOLOGY
Payer: COMMERCIAL

## 2024-11-19 DIAGNOSIS — Z12.39 ENCOUNTER FOR BREAST CANCER SCREENING USING NON-MAMMOGRAM MODALITY: ICD-10-CM

## 2024-11-19 PROCEDURE — 77049 MRI BREAST C-+ W/CAD BI: CPT

## 2024-11-19 PROCEDURE — A9577 INJ MULTIHANCE: HCPCS | Performed by: OBSTETRICS & GYNECOLOGY

## 2024-11-19 PROCEDURE — 25510000002 GADOBENATE DIMEGLUMINE 529 MG/ML SOLUTION: Performed by: OBSTETRICS & GYNECOLOGY

## 2024-11-19 RX ADMIN — GADOBENATE DIMEGLUMINE 15 ML: 529 INJECTION, SOLUTION INTRAVENOUS at 17:48

## 2024-11-20 DIAGNOSIS — Z12.39 BREAST CANCER SCREENING OTHER THAN MAMMOGRAM: Primary | ICD-10-CM

## 2024-11-22 ENCOUNTER — HOSPITAL ENCOUNTER (OUTPATIENT)
Dept: SLEEP MEDICINE | Facility: HOSPITAL | Age: 49
Discharge: HOME | End: 2024-11-22
Admitting: NURSE PRACTITIONER
Payer: COMMERCIAL

## 2024-11-22 DIAGNOSIS — R06.83 LOUD SNORING: ICD-10-CM

## 2024-11-22 DIAGNOSIS — G47.10 HYPERSOMNIA: ICD-10-CM

## 2024-11-22 DIAGNOSIS — E66.9 OBESITY (BMI 30-39.9): ICD-10-CM

## 2024-11-22 PROCEDURE — G0399 HOME SLEEP TEST/TYPE 3 PORTA: HCPCS

## 2024-12-05 ENCOUNTER — TELEPHONE (OUTPATIENT)
Dept: SLEEP MEDICINE | Facility: HOSPITAL | Age: 49
End: 2024-12-05
Payer: COMMERCIAL

## 2024-12-05 NOTE — TELEPHONE ENCOUNTER
I called Pt and went over sleep report and let her know it was negative for KIMBERLEE. I let Pt know that if she wanted further testing we could schedule her. Pt was happy with results no further testing

## 2024-12-16 ENCOUNTER — OFFICE VISIT (OUTPATIENT)
Dept: INTERNAL MEDICINE | Facility: CLINIC | Age: 49
End: 2024-12-16
Payer: COMMERCIAL

## 2024-12-16 VITALS
TEMPERATURE: 97.8 F | RESPIRATION RATE: 14 BRPM | HEIGHT: 61 IN | SYSTOLIC BLOOD PRESSURE: 124 MMHG | BODY MASS INDEX: 31.34 KG/M2 | DIASTOLIC BLOOD PRESSURE: 76 MMHG | HEART RATE: 72 BPM | WEIGHT: 166 LBS

## 2024-12-16 DIAGNOSIS — I10 PRIMARY HYPERTENSION: Chronic | ICD-10-CM

## 2024-12-16 DIAGNOSIS — Z00.00 ANNUAL PHYSICAL EXAM: Primary | ICD-10-CM

## 2024-12-16 DIAGNOSIS — F41.9 ANXIETY: Chronic | ICD-10-CM

## 2024-12-16 DIAGNOSIS — I83.811 VARICOSE VEINS OF RIGHT LOWER EXTREMITY WITH PAIN: ICD-10-CM

## 2024-12-16 DIAGNOSIS — K21.9 GASTROESOPHAGEAL REFLUX DISEASE, UNSPECIFIED WHETHER ESOPHAGITIS PRESENT: Chronic | ICD-10-CM

## 2024-12-16 PROCEDURE — 99396 PREV VISIT EST AGE 40-64: CPT | Performed by: NURSE PRACTITIONER

## 2024-12-16 NOTE — PROGRESS NOTES
Subjective   Chief Complaint   Patient presents with    Annual Exam       History of Present Illness   49 y.o. female presents for annual physical. Energy about the same. She has added weight training to 300 minutes weekly walking. No weight gain. Denies chest pain or dyspnea. No hematochezia or melena. Chronic cough improved (60%) Omeprazole 40 mg bid. Feels like mentally she is in a good place right now 50% improvement and feels like she does not need counseling or treatment at this time. Last CLS with Dr. Che with recall in 2027. P/P scheduled with GYN in February.      Patient Active Problem List   Diagnosis    Hypertension    Anxiety    Lung nodules    Monoallelic mutation of EGFR gene    Gastroesophageal reflux disease       Allergies   Allergen Reactions    Latex Swelling     SKIN REDNESS AND SWELLING       Current Outpatient Medications on File Prior to Visit   Medication Sig Dispense Refill    CALCIUM CITRATE-VITAMIN D3 PO Take 1 tablet by mouth Daily. 1200 MG  HOLD FOR SURGERY      Cholecalciferol (Vitamin D3) 250 MCG (10566 UT) tablet Take 1 tablet by mouth Daily.      fexofenadine (ALLEGRA) 180 MG tablet Take 1 tablet by mouth Daily.      fluticasone (FLONASE) 50 MCG/ACT nasal spray 2 sprays into the nostril(s) as directed by provider Daily. 16 g 6    levonorgestrel (Mirena, 52 MG,) 20 MCG/24HR IUD 1 each by Intrauterine route 1 (One) Time.      losartan (COZAAR) 100 MG tablet Take 1 tablet by mouth Daily. 90 tablet 0    Multiple Vitamins-Minerals (MULTIVITAMIN ADULT PO) Take 1 tablet by mouth Daily. HOLD FOR SURGERY      Omega-3 Fatty Acids (FISH OIL) 1200 MG capsule capsule Take 1 capsule by mouth Daily. EPA AND 900MG DHA  HOLD FOR SURGERY      omeprazole (priLOSEC) 40 MG capsule Take 1 capsule by mouth Daily. 180 capsule 0     No current facility-administered medications on file prior to visit.       Past Medical History:   Diagnosis Date    Allergic 2000    Allergic rhinitis     Anemia     as  "child    Anxiety     Encounter for IUD insertion 2020    MIRENA     H/O Lung nodules     History of hepatitis     AS CHILD    History of medical problems 2020    Lung Biopsy    History of multiple pulmonary nodules     Hypertension     Hypertension in pregnancy     PONV (postoperative nausea and vomiting)     Vaginal delivery     08 DR TAN BABY \"KYDWYN\" 6.12       Family History   Problem Relation Age of Onset    Lung cancer Mother     Osteoporosis Mother             Anxiety disorder Mother     Thyroid disease Mother         Thyroid removed    HIV Father     Hypertension Maternal Grandmother             Coronary artery disease Maternal Grandmother             Osteoporosis Maternal Grandmother             Heart disease Maternal Grandmother          - hx heart attack;  of heart failure    ALS Maternal Grandfather     Hypertension Maternal Grandfather             Coronary artery disease Maternal Grandfather             Coronary artery disease Paternal Grandmother             Heart disease Paternal Grandmother          - heart attack    No Known Problems Paternal Grandfather     No Known Problems Daughter     No Known Problems Son     No Known Problems Maternal Aunt     Breast cancer Maternal Aunt         Great Aunt    Stroke Maternal Aunt             Diabetes Maternal Uncle             Heart attack Maternal Uncle     No Known Problems Paternal Aunt     Colon cancer Paternal Uncle     Diabetes Paternal Uncle             BRCA 1/2 Neg Hx     Endometrial cancer Neg Hx     Ovarian cancer Neg Hx     Malig Hyperthermia Neg Hx        Social History     Socioeconomic History    Marital status:      Spouse name: Jack Calderón    Number of children: 2    Years of education: College   Tobacco Use    Smoking status: Never    Smokeless tobacco: Never   Vaping Use    Vaping status: Never Used   Substance and Sexual " "Activity    Alcohol use: Yes     Alcohol/week: 2.0 standard drinks of alcohol     Types: 1 Glasses of wine, 1 Drinks containing 0.5 oz of alcohol per week     Comment: 1 glass of wine or 1 mixed drink per week    Drug use: Never    Sexual activity: Yes     Partners: Male     Birth control/protection: I.U.D.       Past Surgical History:   Procedure Laterality Date     SECTION  2006 AND     COLONOSCOPY N/A 2022    Procedure: COLONOSCOPY;  Surgeon: Laly Banegas MD;  Location: [facility] MAIN OR;  Service: Gastroenterology;  Laterality: N/A;  normal    COLONOSCOPY      COLONOSCOPY      EAR TUBES      Tympanostomy tube placement as a child multiple times    THORACOSCOPY Left 2020    Procedure: BRONCHOSCOPY, LEFT VIDEO ASSISTED THORACOSCOPY WITH DAVINCI ROBOT ASSISTED LEFT UPPER LOBE WEDGE RESECTION, LEFT LOWER LOBE WEDGE RESECTION, RESECTION OF MEDIASTINAL CYST,  INTERCOSTAL NERVE BLOCKS;  Surgeon: Gio Burnett III, MD;  Location: [facility] MAIN OR;  Service: [facility];  Laterality: Left;    TONSILLECTOMY      TYMPANOPLASTY      WISDOM TOOTH EXTRACTION         The following portions of the patient's history were reviewed and updated as appropriate: problem list, allergies, current medications, past medical history, past family history, past social history, and past surgical history.    Review of Systems    Immunization History   Administered Date(s) Administered    Influenza, Unspecified 10/21/2021, 10/06/2022, 10/15/2024    Tdap 2024    flucelvax quad pfs =>4 YRS 10/13/2020, 10/21/2021       Objective   Vitals:    24 1241   BP: 124/76   Pulse: 72   Resp: 14   Temp: 97.8 °F (36.6 °C)   Weight: 75.3 kg (166 lb)   Height: 156.2 cm (61.5\")     Body mass index is 30.86 kg/m².  Physical Exam  Vitals reviewed.   Constitutional:       Appearance: Normal appearance. She is well-developed.   HENT:      Head: Normocephalic and atraumatic.      Right Ear: Tympanic membrane, " ear canal and external ear normal.      Left Ear: Tympanic membrane, ear canal and external ear normal.      Nose: Nose normal.      Mouth/Throat:      Mouth: Mucous membranes are moist.      Pharynx: Oropharynx is clear. No oropharyngeal exudate or posterior oropharyngeal erythema.   Eyes:      General: No scleral icterus.     Extraocular Movements: Extraocular movements intact.      Conjunctiva/sclera: Conjunctivae normal.      Pupils: Pupils are equal, round, and reactive to light.   Neck:      Thyroid: No thyromegaly.      Vascular: No carotid bruit.   Cardiovascular:      Rate and Rhythm: Normal rate and regular rhythm.      Heart sounds: Normal heart sounds. No murmur heard.     Comments: Varicose vein noted inner right ankle.   Pulmonary:      Effort: Pulmonary effort is normal.      Breath sounds: Normal breath sounds.   Abdominal:      General: Bowel sounds are normal. There is no distension.      Palpations: Abdomen is soft.      Tenderness: There is no abdominal tenderness.   Musculoskeletal:      Cervical back: Neck supple.      Comments: Ambulates without assistance; no clubbing, cyanosis or edema.    Lymphadenopathy:      Cervical: No cervical adenopathy.   Skin:     General: Skin is warm and dry.   Neurological:      Mental Status: She is alert.   Psychiatric:         Mood and Affect: Mood normal.         Behavior: Behavior normal.         Procedures    Assessment & Plan   Diagnoses and all orders for this visit:    1. Annual physical exam (Primary)  Comments:  150-300 minutes weekly aerobic exercise advised. P/P scheduled in February. [facility] UTD.    2. Primary hypertension  Comments:  Controlled. Continue current medications and RTO in 6M.    3. Anxiety  Comments:  50% improved. She feels like she is in a better place nad does not need treatment or counseling.    4. Gastroesophageal reflux disease  Comments:  60% improved with Omeprazole 40 mg bid. Reflux precaustions reviewed.    5. Varicose veins of  right lower extremity with pain  Comments:  Wearing compression stockings but still painful.  Orders:  -     Ambulatory Referral to Vascular Surgery    Records reviewed include previous OV with myself as well as labs.     Return in about 6 months (around 6/16/2025) for 30.

## 2025-01-08 DIAGNOSIS — I10 ESSENTIAL HYPERTENSION: Chronic | ICD-10-CM

## 2025-01-08 RX ORDER — LOSARTAN POTASSIUM 100 MG/1
100 TABLET ORAL DAILY
Qty: 90 TABLET | Refills: 0 | Status: SHIPPED | OUTPATIENT
Start: 2025-01-08

## 2025-01-31 DIAGNOSIS — R05.3 CHRONIC COUGH: Chronic | ICD-10-CM

## 2025-01-31 DIAGNOSIS — K21.9 GASTROESOPHAGEAL REFLUX DISEASE, UNSPECIFIED WHETHER ESOPHAGITIS PRESENT: Primary | Chronic | ICD-10-CM

## 2025-01-31 RX ORDER — OMEPRAZOLE 40 MG/1
40 CAPSULE, DELAYED RELEASE ORAL 2 TIMES DAILY
Qty: 180 CAPSULE | Refills: 3 | Status: SHIPPED | OUTPATIENT
Start: 2025-01-31

## 2025-02-05 PROBLEM — I83.811 VARICOSE VEINS OF RIGHT LOWER EXTREMITY WITH PAIN: Status: ACTIVE | Noted: 2025-02-05

## 2025-02-05 NOTE — PROGRESS NOTES
"Chief Complaint  Varicose Veins (Rt lower extremity with pain)    Subjective      HPI: Roxy Calvert is a 49 y.o. female seen as a new patient for evaluation of painful varicose veins of the right lower extremity.  She has had a painful, tender nodule in the anteromedial right ankle since 2023, associated with aching and throbbing pain.  Occurs maybe half of days, with intensity mild-medium.  Worsen with prolonged standing.  Interferes with activities of daily living.  Has been advised to wear compression stockings, which provide some benefit, but her relief is incomplete.  No history of venous thrombosis, no venous interventions.  Other portions of right lower extremity and the entire left lower extremity are asymptomatic.    Objective   Vital Signs:  Pulse 83   Temp 97.3 °F (36.3 °C) (Temporal)   Ht 157.5 cm (62.01\")   Wt 73.1 kg (161 lb 3.2 oz)   SpO2 97%   BMI 29.48 kg/m²   Estimated body mass index is 29.48 kg/m² as calculated from the following:    Height as of this encounter: 157.5 cm (62.01\").    Weight as of this encounter: 73.1 kg (161 lb 3.2 oz).   BMI is >= 25 and <30. (Overweight) The following options were offered after discussion;: Information on healthy weight added to patient's after visit summary.   Roxy Calvert  reports that she has never smoked. She has never used smokeless tobacco..     Exam: BMI 29.5.  Easily palpable pedal artery pulses.  There is focal, tender swelling, almost nodular measuring about 1 cm, of the anteromedial right ankle, in the expected location of the great saphenous vein.  The area does decompress with elevation.  There is a single 6 mm varix in the anteromedial distal thigh, perhaps in the distribution of the GSV.  The contralateral left lower extremity is normal.  Calf is soft and nontender.  No swelling or skin changes.  Right and left ankle circumferences 23 cm.  Calf circumferences 42 and 41 cm, respectively.    Personal review of data: Notes from " patient's PCP 12/16/2024.       Assessment and Plan     Diagnoses and all orders for this visit:    1. Varicose veins of right lower extremity with pain (Primary)  -     Venous w Reflux Lower Extremity - Unilateral CAR; Future    2. Right leg pain  -     Venous w Reflux Lower Extremity - Unilateral CAR; Future    3. Nodule, subcutaneous  -     Venous w Reflux Lower Extremity - Unilateral CAR; Future    Summary: 49-year-old woman with painful nodular swelling involving the anteromedial right ankle in the expected location of the GSV just anterior to the medial malleolus.  The nature of this area is undetermined.  It may be a varicose vein associated with an incompetent perforating vein or may be a lipoma or a neuroma of the saphenous nerve.  Superficial vein thrombosis or phlebitis of this vein are other possibilities.  It did appear to disappear with limb elevation, however.  The significance of the varix in the thigh is undetermined.  Because there is associated lifestyle impairment the patient is interested in further evaluation and treatment.  Recommend right lower extremity vein map with particular imaging of the nodular area in question, looking for a mass like lipoma or imaging demonstrating that the area is clearly a varix.  May be a candidate for simple excision.  It does not appear that she would need treatment of other varicosities.    Follow Up     Return for Follow-up after test.  Patient was given instructions and counseling regarding her condition or for health maintenance advice. Please see specific information pulled into the AVS if appropriate.

## 2025-02-06 ENCOUNTER — OFFICE VISIT (OUTPATIENT)
Age: 50
End: 2025-02-06
Payer: COMMERCIAL

## 2025-02-06 VITALS
TEMPERATURE: 97.3 F | OXYGEN SATURATION: 97 % | WEIGHT: 161.2 LBS | HEART RATE: 83 BPM | HEIGHT: 62 IN | BODY MASS INDEX: 29.66 KG/M2

## 2025-02-06 DIAGNOSIS — R22.9 NODULE, SUBCUTANEOUS: ICD-10-CM

## 2025-02-06 DIAGNOSIS — I83.811 VARICOSE VEINS OF RIGHT LOWER EXTREMITY WITH PAIN: Primary | ICD-10-CM

## 2025-02-06 DIAGNOSIS — M79.604 RIGHT LEG PAIN: ICD-10-CM

## 2025-02-06 NOTE — PATIENT INSTRUCTIONS
"Healthy Eating Suggestions    Healthy eating may help you get and keep a healthy body weight, reduce the risk of chronic illnesses, and live a longer and more productive life. It is important to follow a healthy eating pattern. Your nutritional and calorie needs should be met mainly by different nutrient-rich foods.  Here are some tips:  Reading food labels  Read labels and choose the following:  Reduced or low sodium products.  Juices with 100% fruit juice.  Foods with low saturated fats (<3 g per serving) and high polyunsaturated and monounsaturated fats.  Foods with whole grains, such as whole wheat, cracked wheat, brown rice, and wild rice.  Whole grains that are fortified with folic acid. This is recommended for females who are pregnant or who want to become pregnant.  Read labels and do not eat or drink the following:  Foods or drinks with added sugars. These include foods that contain brown sugar, corn sweetener, corn syrup, dextrose, fructose, glucose, high-fructose corn syrup, honey, invert sugar, lactose, malt syrup, maltose, molasses, raw sugar, sucrose, trehalose, or turbinado sugar.  Limit your intake of added sugars to less than 10% of your total daily calories. Do not eat more than the following amounts of added sugar per day:  6 teaspoons (25 g) for females.  9 teaspoons (38 g) for males.  Foods that contain processed or refined starches and grains.  Refined grain products, such as white flour, degermed cornmeal, white bread, and white rice.  Shopping  Choose nutrient-rich snacks, such as vegetables, whole fruits, and nuts. Avoid high-calorie and high-sugar snacks, such as potato chips, fruit snacks, and candy.  Use oil-based dressings and spreads on foods instead of solid fats such as butter, margarine, sour cream, or cream cheese.  Limit pre-made sauces, mixes, and \"instant\" products such as flavored rice, instant noodles, and ready-made pasta.  Try more plant-protein sources, such as tofu, black " beans, edamame, lentils, nuts, and seeds.  Explore eating plans such as the Mediterranean diet or vegetarian diet.  Try heart-healthy dips made with beans and healthy fats like hummus and guacamole. Vegetables go great with these.  Cooking  Use oil to sauté or stir-bianchi foods instead of solid fats such as butter, margarine, or lard.  Try baking, boiling, grilling, or broiling instead of frying.  Remove the fatty part of meats before cooking.  Steam vegetables in water or broth.  Meal planning    At meals, imagine dividing your plate into fourths:  One-half of your plate is fruits and vegetables.  One-fourth of your plate is whole grains.  One-fourth of your plate is protein, especially lean meats, poultry, eggs, tofu, beans, or nuts.  Include low-fat dairy as part of your daily diet.  Lifestyle  Choose healthy options in all settings, including home, work, school, restaurants, or stores.  Prepare your food safely:  Wash your hands after handling raw meats.  Where you prepare food, keep surfaces clean by regularly washing with hot, soapy water.  Keep raw meat separate from ready-to-eat foods, such as fruits and vegetables.  , meat, poultry, and eggs to the recommended temperature. Get a food thermometer.  Store foods at safe temperatures. In general:  Keep cold foods at 40°F (4.4°C) or below.  Keep hot foods at 140°F (60°C) or above.  Keep your freezer at 0°F (-17.8°C) or below.  Foods are not safe to eat if they have been between the temperatures of °F (4.4-60°C) for more than 2 hours.  What foods should I eat?  Fruits  Aim to eat 1½-2½ cups of fresh, canned (in natural juice), or frozen fruits each day. One cup of fruit equals 1 small apple, 1 large banana, 8 large strawberries, 1 cup (237 g) canned fruit, ½ cup (82 g) dried fruit, or 1 cup (240 mL) 100% juice.  Vegetables  Aim to eat 2-4 cups of fresh and frozen vegetables each day, including different varieties and colors. One cup of vegetables  equals 1 cup (91 g) broccoli or cauliflower florets, 2 medium carrots, 2 cups (150 g) raw, leafy greens, 1 large tomato, 1 large nobles pepper, 1 large sweet potato, or 1 medium white potato.  Grains  Aim to eat 5-10 ounce-equivalents of whole grains each day. Examples of 1 ounce-equivalent of grains include 1 slice of bread, 1 cup (40 g) ready-to-eat cereal, 3 cups (24 g) popcorn, or ½ cup (93 g) cooked rice.  Meats and other proteins  Try to eat 5-7 ounce-equivalents of protein each day. Examples of 1 ounce-equivalent of protein include 1 egg, ½ oz nuts (12 almonds, 24 pistachios, or 7 walnut halves), 1/4 cup (90 g) cooked beans, 6 tablespoons (90 g) hummus or 1 tablespoon (16 g) peanut butter. A cut of meat or fish that is the size of a deck of cards is about 3-4 ounce-equivalents (85 g).  Of the protein you eat each week, try to have at least 8 sounce (227 g) of seafood. This is about 2 servings per week. This includes salmon, trout, herring, sardines, and anchovies.  Dairy  Aim to eat 3 cup-equivalents of fat-free or low-fat dairy each day. Examples of 1 cup-equivalent of dairy include 1 cup (240 mL) milk, 8 ounces (250 g) yogurt, 1½ ounces (44 g) natural cheese, or 1 cup (240 mL) fortified soy milk.  Fats and oils  Aim for about 5 teaspoons (21 g) of fats and oils per day. Choose monounsaturated fats, such as canola and olive oils, mayonnaise made with olive oil or avocado oil, avocados, peanut butter, and most nuts, or polyunsaturated fats, such as sunflower, corn, and soybean oils, walnuts, pine nuts, sesame seeds, sunflower seeds, and flaxseed.  Beverages  Aim for 6 eight-ounce glasses of water per day. Limit coffee to 3-5 eight-ounce cups per day.  Limit caffeinated beverages that have added calories, such as soda and energy drinks.  If you drink alcohol:  Limit how much you have to:  0-1 drink a day if you are female.  0-2 drinks a day if you are male.  Know how much alcohol is in your drink. In the [place]  one drink is one 12 oz bottle of beer (355 mL), one 5 oz glass of wine (148 mL), or one 1½ oz glass of hard liquor (44 mL).  Seasoning and other foods  Try not to add too much salt to your food. Try using herbs and spices instead of salt.  Try not to add sugar to food.  This information is based on [place] nutrition guidelines. To learn more, visit chooseOmPromptplate.gov. Exact amounts may vary. You may need different amounts.  This information is not intended to replace advice given to you by your health care provider. Make sure you discuss any questions you have with your health care provider.    Elsevier Patient Education © 2024 [employer] Inc.

## 2025-02-27 ENCOUNTER — OFFICE VISIT (OUTPATIENT)
Dept: OBSTETRICS AND GYNECOLOGY | Age: 50
End: 2025-02-27
Payer: COMMERCIAL

## 2025-02-27 VITALS
WEIGHT: 158 LBS | BODY MASS INDEX: 29.08 KG/M2 | SYSTOLIC BLOOD PRESSURE: 120 MMHG | HEIGHT: 62 IN | DIASTOLIC BLOOD PRESSURE: 74 MMHG

## 2025-02-27 DIAGNOSIS — Z11.51 SCREENING FOR HUMAN PAPILLOMAVIRUS (HPV): ICD-10-CM

## 2025-02-27 DIAGNOSIS — Z12.31 SCREENING MAMMOGRAM FOR BREAST CANCER: ICD-10-CM

## 2025-02-27 DIAGNOSIS — Z01.419 ENCOUNTER FOR GYNECOLOGICAL EXAMINATION WITHOUT ABNORMAL FINDING: Primary | ICD-10-CM

## 2025-02-27 DIAGNOSIS — Z01.419 WELL FEMALE EXAM WITH ROUTINE GYNECOLOGICAL EXAM: ICD-10-CM

## 2025-02-27 DIAGNOSIS — Z12.4 SCREENING FOR MALIGNANT NEOPLASM OF CERVIX: ICD-10-CM

## 2025-02-27 NOTE — PROGRESS NOTES
Routine Annual Visit    2025    Patient: Roxy Calvert          MR#:[id number]      Chief Complaint   Patient presents with    Gynecologic Exam     Annual Exam - last pap 2/10/22 neg, pt had breast MRI on 24, colonoscopy 22, pt has mirena IUD inserted 20, pt would like to discuss perimenopause        History of Present Illness    49 y.o. female  who presents for annual exam.     Patient is feeling well  She has rare spotting with the IUD  Her IUD is good until 28  She has an occasional hot flash or night sweats  Pap smear is due  Mammogram is scheduled in April  Colonoscopy is up-to-date  In November she had an MRI of the breast which was negative  No other complaints      No LMP recorded. Patient has had an implant.  Obstetric History:  OB History          4    Para   3    Term   2       1    AB        Living   2         SAB        IAB        Ectopic        Molar        Multiple        Live Births   2               Menstrual History:     No LMP recorded. Patient has had an implant.       Sexual History:       ________________________________________  Patient Active Problem List   Diagnosis    Hypertension    Anxiety    Lung nodules    Monoallelic mutation of EGFR gene    Gastroesophageal reflux disease    Varicose veins of right lower extremity with pain       Past Medical History:   Diagnosis Date    Allergic     Allergic rhinitis     Anemia     as child    Anxiety     Encounter for IUD insertion 2020    MIRENA     Gestational hypertension 10/05    H/O Lung nodules     History of hepatitis     AS CHILD    History of medical problems 2020    Lung Biopsy    History of multiple pulmonary nodules     Hypertension     Hypertension in pregnancy     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    Migraine     PONV (postoperative nausea and vomiting)     Preeclampsia 10/05    Urinary tract infection 1997    Vaginal delivery     08 DR TAN BABY  "\"KYDWYN\" 6.12    Varicella 1982       Past Surgical History:   Procedure Laterality Date    BREAST BIOPSY  3/2023, 2023     SECTION  2006 AND     COLONOSCOPY N/A 2022    Procedure: COLONOSCOPY;  Surgeon: Laly Banegas MD;  [place] [facility] [place];  Service: Gastroenterology;  Laterality: N/A;  normal    COLONOSCOPY      COLONOSCOPY      EAR TUBES      Tympanostomy tube placement as a child multiple times    THORACOSCOPY Left 2020    Procedure: BRONCHOSCOPY, LEFT VIDEO ASSISTED THORACOSCOPY WITH DAVINCI ROBOT ASSISTED LEFT UPPER LOBE WEDGE RESECTION, LEFT LOWER LOBE WEDGE RESECTION, RESECTION OF MEDIASTINAL CYST,  INTERCOSTAL NERVE BLOCKS;  Surgeon: Gio Burnett III, MD;  Location: [facility] MAIN OR;  Service: DaVinci;  Laterality: Left;    TONSILLECTOMY      TYMPANOPLASTY      WISDOM TOOTH EXTRACTION         Social History     Tobacco Use   Smoking Status Never   Smokeless Tobacco Never       has a current medication list which includes the following prescription(s): calcium citrate-vitamin d, vitamin d3, fexofenadine, fluticasone, mirena (52 mg), losartan, multiple vitamin, fish oil, and omeprazole.  ________________________________________    Current contraception: IUD  History of abnormal Pap smear: no  Family history of Breast cancer: M aunt  Family history of uterine or ovarian cancer: no  Family History of colon cancer/colon polyps: no  History of abnormal mammogram: no      The following portions of the patient's history were reviewed and updated as appropriate: allergies, current medications, past family history, past medical history, past social history, past surgical history, and problem list.    Review of Systems    Pertinent items are noted in HPI.     Objective   Physical Exam    /74 (BP Location: Left arm, Patient Position: Sitting)   Ht 157.5 cm (62.01\")   Wt 71.7 kg (158 lb)   BMI 28.89 kg/m²    BP Readings from Last 3 Encounters: " "  02/27/25 120/74   01/14/25 120/82   12/16/24 124/76      Wt Readings from Last 3 Encounters:   02/27/25 71.7 kg (158 lb)   02/06/25 73.1 kg (161 lb 3.2 oz)   01/14/25 73.5 kg (162 lb)      BMI: Estimated body mass index is 28.89 kg/m² as calculated from the following:    Height as of this encounter: 157.5 cm (62.01\").    Weight as of this encounter: 71.7 kg (158 lb).      General:   alert, appears stated age, and cooperative   Abdomen: soft, non-tender, without masses or organomegaly   Breast: inspection negative, no nipple discharge or bleeding, no masses or nodularity palpable   Vulva: normal, Bartholin's, Urethra, [place]'s normal   Vagina: normal mucosa   Cervix: no cervical motion tenderness and no lesions   Uterus: normal size, mobile, and non-tender   Adnexa: no mass, fullness, tenderness     Assessment:    1. Normal annual exam   Assessment     ICD-10-CM ICD-9-CM   1. Encounter for gynecological examination without abnormal finding  Z01.419 V72.31   2. Well female exam with routine gynecological exam  Z01.419 V72.31   3. Screening for human papillomavirus (HPV)  Z11.51 V73.81   4. Screening for malignant neoplasm of cervix  Z12.4 V76.2   5. Screening mammogram for breast cancer  Z12.31 V76.12     Plan:    Plan     [x]  Mammogram request made  [x]  PAP done  []  Labs:   []  GC/Chl/TV  []  DEXA scan   []  Referral for colonoscopy:       Diagnoses and all orders for this visit:    1. Encounter for gynecological examination without abnormal finding (Primary)    2. Well female exam with routine gynecological exam  -     IGP, Apt HPV,rfx 16 / 18,45    3. Screening for human papillomavirus (HPV)  -     IGP, Apt HPV,rfx 16 / 18,45    4. Screening for malignant neoplasm of cervix  -     IGP, Apt HPV,rfx 16 / 18,45    5. Screening mammogram for breast cancer  -     Mammo Screening Digital Tomosynthesis Bilateral With CAD; Future            Counseling:  --Nutrition: Stressed importance of moderation and caloric " balance, stressed fresh fruit and vegetables  --Exercise: Stressed the importance of regular exercise. 3-5 times weekly   - Discussed screening mammogram recommendations.   --Discussed benefits of screening colonoscopy- age 45 unless FH  --Discussed pap smear screening recommendations

## 2025-03-04 LAB
CYTOLOGIST CVX/VAG CYTO: NORMAL
CYTOLOGY CVX/VAG DOC CYTO: NORMAL
CYTOLOGY CVX/VAG DOC THIN PREP: NORMAL
DX ICD CODE: NORMAL
HPV I/H RISK 4 DNA CVX QL PROBE+SIG AMP: NEGATIVE
OTHER STN SPEC: NORMAL
SERVICE CMNT-IMP: NORMAL
STAT OF ADQ CVX/VAG CYTO-IMP: NORMAL

## 2025-03-27 ENCOUNTER — TELEPHONE (OUTPATIENT)
Dept: OBSTETRICS AND GYNECOLOGY | Age: 50
End: 2025-03-27
Payer: COMMERCIAL

## 2025-03-27 ENCOUNTER — TELEPHONE (OUTPATIENT)
Dept: ONCOLOGY | Facility: CLINIC | Age: 50
End: 2025-03-27
Payer: COMMERCIAL

## 2025-03-27 NOTE — TELEPHONE ENCOUNTER
Invitae results showed to Dr. Belle. Per Dr. 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 We will see her in Oct as scheduled. LVM informing pt of this and advised her to call back with any questions or concerns.

## 2025-03-27 NOTE — TELEPHONE ENCOUNTER
Caller: Enrike Roxy MALLORY    Relationship: Self    Best call back number: [phone]    What test was performed: LABS    When was the test performed: 2/3/20 - PLEASE SEE SCANNED RESULTS IN PT'S CHART UNDER LABS.    Where was the test performed: OBGYN    Additional notes: SEE NOTE IN CHART FROM 3/25/25 AS PT WAS JUST NOTIFIED THAT WITH UPDATED RESEARCH HER CASE WENT FROM  probable pathogenic to definitely pathogenic.  HER OBGYN STATES  PT IS OKAY AS LONG AS SHE IS F/U WITH DR. DANGELO.  IF PT NEEDS TO BEEN SEEN BEFORE 10/25, PLEASE LET HER KNOW.

## 2025-03-27 NOTE — TELEPHONE ENCOUNTER
Spoke with pt  Variant mutation (EGFR) now classified as definitely pathogenic  Pt already getting screening as if pathogenic so no change in plan  All questions answered  She has appropriate follow up with Dr Belle

## 2025-04-06 DIAGNOSIS — I10 ESSENTIAL HYPERTENSION: Chronic | ICD-10-CM

## 2025-04-07 RX ORDER — LOSARTAN POTASSIUM 100 MG/1
100 TABLET ORAL DAILY
Qty: 90 TABLET | Refills: 0 | Status: SHIPPED | OUTPATIENT
Start: 2025-04-07

## 2025-04-09 NOTE — PROGRESS NOTES
"Chief Complaint  Varicose Veins (Class 1 mapping)    Subjective      HPI: Roxy Calvert is a 49 y.o. female returns for follow-up of painful nodular swelling in the anteromedial right ankle.  Returns having ongoing pain and tenderness in that area.    Objective   Vital Signs:  /76 (BP Location: Left arm)   Ht 157.5 cm (62.01\")   Wt 71.7 kg (158 lb)   BMI 28.89 kg/m²   Estimated body mass index is 28.89 kg/m² as calculated from the following:    Height as of this encounter: 157.5 cm (62.01\").    Weight as of this encounter: 71.7 kg (158 lb).        Roxy Calvert  reports that she has never smoked. She has never been exposed to tobacco smoke. She has never used smokeless tobacco..     Exam: There is a palpable, moderately tender nodule    Personal review of data: Images and tracings of right lower extremity vein map performed today       Assessment and Plan     Diagnoses and all orders for this visit:    1. 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 Right leg pain    3. Nodule, subcutaneous    Other orders  -     Follow Anesthesia Guidelines / Protocol; Future  -     Follow Anesthesia Guidelines / Protocol; Standing  -     Provide Patient With Instructions on NPO Status; Future    Summary: 49-year-old woman returns for follow-up of nodular swelling of the anteromedial right ankle.  No change.  Continues to have daily pain and tenderness, sufficient to awaken her from a sound sleep.  Duplex demonstrates a varix with apparent superficial vein thrombosis.  I wonder whether instead it may be a glomus tumor.  At any rate, the area is painful and associated with lifestyle impairment, since it causes a sleep disturbance.  She is interested in excision.  I offered her this and she is interested in proceeding.  I described the nature of excision of this abnormal vein or vein mass on an outpatient basis at the hospital.  " We discussed potential irritation of the saphenous nerve.  She is very interested in proceeding.  We hope to schedule the procedure in the near future.    Follow Up     No follow-ups on file.  Patient was given instructions and counseling regarding her condition or for health maintenance advice. Please see specific information pulled into the AVS if appropriate.

## 2025-04-10 ENCOUNTER — HOSPITAL ENCOUNTER (OUTPATIENT)
Facility: HOSPITAL | Age: 50
Discharge: HOME | End: 2025-04-10
Admitting: SURGERY
Payer: COMMERCIAL

## 2025-04-10 ENCOUNTER — OFFICE VISIT (OUTPATIENT)
Age: 50
End: 2025-04-10
Payer: COMMERCIAL

## 2025-04-10 VITALS
WEIGHT: 158 LBS | SYSTOLIC BLOOD PRESSURE: 122 MMHG | BODY MASS INDEX: 29.08 KG/M2 | HEIGHT: 62 IN | DIASTOLIC BLOOD PRESSURE: 76 MMHG

## 2025-04-10 DIAGNOSIS — I83.811 VARICOSE VEINS OF RIGHT LOWER EXTREMITY WITH PAIN: ICD-10-CM

## 2025-04-10 DIAGNOSIS — I82.811 EMBOLISM AND THROMBOSIS OF SUPERFICIAL VEIN OF RIGHT LOWER EXTREMITY: Primary | ICD-10-CM

## 2025-04-10 DIAGNOSIS — M79.604 RIGHT LEG PAIN: ICD-10-CM

## 2025-04-10 DIAGNOSIS — R22.9 NODULE, SUBCUTANEOUS: ICD-10-CM

## 2025-04-10 PROBLEM — R22.41 SUBCUTANEOUS MASS OF RIGHT LOWER EXTREMITY: Status: ACTIVE | Noted: 2025-04-10

## 2025-04-10 LAB
BH CV LOW VAS LEFT EXTERNAL ILIAC AUGMENT: NORMAL
BH CV LOW VAS LEFT EXTERNAL ILIAC COMPRESS: NORMAL
BH CV LOW VAS LEFT EXTERNAL ILIAC NOT VIS SCRIPTING: 1
BH CV LOW VAS RIGHT COMMON FEM NOT VIS SCRIPTING: 1
BH CV LOW VAS RIGHT EXTERNAL ILIAC NOT VIS SCRIPTING: 1
BH CV LOW VAS RIGHT GREATER SAPH AK VESSEL: 1
BH CV LOW VAS RIGHT SAPHENOFEM VESSEL: 1
BH CV LOW VAS RIGHT VARICOSITY BK VESSEL: 1
BH CV LOWER VAS RIGHT GSV DIST THIGH COMPRESSIBILTY: NORMAL
BH CV LOWER VAS RIGHT GSV MID CALF COMPRESSIBILTY: NORMAL
BH CV LOWER VAS RIGHT GSV MID THIGH COMPRESSIBILTY: NORMAL
BH CV LOWER VASCULAR LEFT COMMON FEMORAL AUGMENT: NORMAL
BH CV LOWER VASCULAR LEFT COMMON FEMORAL COMPETENT: NORMAL
BH CV LOWER VASCULAR LEFT COMMON FEMORAL COMPRESS: NORMAL
BH CV LOWER VASCULAR LEFT COMMON FEMORAL PHASIC: NORMAL
BH CV LOWER VASCULAR LEFT COMMON FEMORAL SPONT: NORMAL
BH CV LOWER VASCULAR LEFT EXTERNAL ILIAC COMPETENT: NORMAL
BH CV LOWER VASCULAR LEFT EXTERNAL ILIAC PHASIC: NORMAL
BH CV LOWER VASCULAR LEFT EXTERNAL ILIAC SPONT: NORMAL
BH CV LOWER VASCULAR RIGHT AA GSV COMPETENT: NORMAL
BH CV LOWER VASCULAR RIGHT AA GSV COMPRESS: NORMAL
BH CV LOWER VASCULAR RIGHT COMMON FEMORAL AUGMENT: NORMAL
BH CV LOWER VASCULAR RIGHT COMMON FEMORAL COMPETENT: NORMAL
BH CV LOWER VASCULAR RIGHT COMMON FEMORAL COMPRESS: NORMAL
BH CV LOWER VASCULAR RIGHT COMMON FEMORAL PHASIC: NORMAL
BH CV LOWER VASCULAR RIGHT COMMON FEMORAL SPONT: NORMAL
BH CV LOWER VASCULAR RIGHT DISTAL FEMORAL COMPRESS: NORMAL
BH CV LOWER VASCULAR RIGHT EXTERNAL ILIAC AUGMENT: NORMAL
BH CV LOWER VASCULAR RIGHT EXTERNAL ILIAC COMPETENT: NORMAL
BH CV LOWER VASCULAR RIGHT EXTERNAL ILIAC COMPRESS: NORMAL
BH CV LOWER VASCULAR RIGHT EXTERNAL ILIAC PHASIC: NORMAL
BH CV LOWER VASCULAR RIGHT EXTERNAL ILIAC SPONT: NORMAL
BH CV LOWER VASCULAR RIGHT GASTRONEMIUS COMPRESS: NORMAL
BH CV LOWER VASCULAR RIGHT GREATER SAPH AK COMPETENT: NORMAL
BH CV LOWER VASCULAR RIGHT GREATER SAPH BK COMPETENT: NORMAL
BH CV LOWER VASCULAR RIGHT GREATER SAPH BK COMPRESS: NORMAL
BH CV LOWER VASCULAR RIGHT GSV DIST THIGH COMPETENT: NORMAL
BH CV LOWER VASCULAR RIGHT GSV MID CALF COMPETENT: NORMAL
BH CV LOWER VASCULAR RIGHT GSV MID THIGH COMPETENT: NORMAL
BH CV LOWER VASCULAR RIGHT LESSER SAPH COMPETENT: NORMAL
BH CV LOWER VASCULAR RIGHT LESSER SAPH COMPRESS: NORMAL
BH CV LOWER VASCULAR RIGHT MID FEMORAL AUGMENT: NORMAL
BH CV LOWER VASCULAR RIGHT MID FEMORAL COMPETENT: NORMAL
BH CV LOWER VASCULAR RIGHT MID FEMORAL COMPRESS: NORMAL
BH CV LOWER VASCULAR RIGHT MID FEMORAL PHASIC: NORMAL
BH CV LOWER VASCULAR RIGHT MID FEMORAL SPONT: NORMAL
BH CV LOWER VASCULAR RIGHT PERONEAL COMPRESS: NORMAL
BH CV LOWER VASCULAR RIGHT POPLITEAL AUGMENT: NORMAL
BH CV LOWER VASCULAR RIGHT POPLITEAL COMPETENT: NORMAL
BH CV LOWER VASCULAR RIGHT POPLITEAL COMPRESS: NORMAL
BH CV LOWER VASCULAR RIGHT POPLITEAL PHASIC: NORMAL
BH CV LOWER VASCULAR RIGHT POPLITEAL SPONT: NORMAL
BH CV LOWER VASCULAR RIGHT POSTERIOR TIBIAL COMPRESS: NORMAL
BH CV LOWER VASCULAR RIGHT PROFUNDA FEMORAL COMPRESS: NORMAL
BH CV LOWER VASCULAR RIGHT PROXIMAL FEMORAL COMPRESS: NORMAL
BH CV LOWER VASCULAR RIGHT SAPHENOFEMORAL JUNCTION AUGMENT: NORMAL
BH CV LOWER VASCULAR RIGHT SAPHENOFEMORAL JUNCTION COMPETENT: NORMAL
BH CV LOWER VASCULAR RIGHT SAPHENOFEMORAL JUNCTION COMPRESS: NORMAL
BH CV LOWER VASCULAR RIGHT SAPHENOFEMORAL JUNCTION PHASIC: NORMAL
BH CV LOWER VASCULAR RIGHT SAPHENOFEMORAL JUNCTION SPONT: NORMAL
BH CV LOWER VASCULAR RIGHT SAPHENOPOP JX AUGMENT: NORMAL
BH CV LOWER VASCULAR RIGHT SAPHENOPOP JX COMPETENT: NORMAL
BH CV LOWER VASCULAR RIGHT SAPHENOPOP JX COMPRESS: NORMAL
BH CV LOWER VASCULAR RIGHT SAPHENOPOP JX PHASIC: NORMAL
BH CV LOWER VASCULAR RIGHT SAPHENOPOP JX SPONT: NORMAL
BH CV LOWER VASCULAR RIGHT SSV MID CALF COMPETENT: NORMAL
BH CV LOWER VASCULAR RIGHT SSV MID CALF COMPRESS: NORMAL
BH CV RIGHT LOWER VAS AA GSV TRANS DIAMETER: 0.3 CM
BH CV RIGHT LOWER VAS COMMON FEMORAL REFLUX COLOR FLOW TIME: 1.63 SEC
BH CV RIGHT LOWER VAS EXT ILIAC REFLUX COLOR FLOW TIME: 1.54 SEC
BH CV RIGHT LOWER VAS GSV KNEE TRANS DIAMETER: 0.4 CM
BH CV RIGHT LOWER VAS GSV MID CALF TRANS DIAMETER: 0.2 CM
BH CV RIGHT LOWER VAS GSV MID THIGH TRANS DIAMETER: 0.4 CM
BH CV RIGHT LOWER VAS GSV PROX CALF TRANS DIAMETER: 0.3 CM
BH CV RIGHT LOWER VAS GSV PROX THIGH TRANS DIAMETER: 0.5 CM
BH CV RIGHT LOWER VAS SAPHENOFEM JUNCTION REFLUX TIME: 3.03 SEC
BH CV RIGHT LOWER VAS SAPHENOFEM JUNCTION TRANSVERSE DIAMETER: 0.7 CM
BH CV RIGHT LOWER VAS SPJ TRANS DIAMETER: 0.3 CM
BH CV RIGHT LOWER VAS SSV MID CALF TRANS DIAMETER: 0.2 CM
BH CV RIGHT LOWER VAS SSV PROX CALF TRANS DIAMETER: 0.3 CM
BH CV VAS RIGHT GSV PROXIMAL HIDDEN LRR COMPRESSIBILTY: NORMAL

## 2025-04-10 PROCEDURE — 93971 EXTREMITY STUDY: CPT

## 2025-04-21 ENCOUNTER — APPOINTMENT (OUTPATIENT)
Dept: WOMENS IMAGING | Facility: HOSPITAL | Age: 50
End: 2025-04-21
Payer: COMMERCIAL

## 2025-04-21 PROCEDURE — 77067 SCR MAMMO BI INCL CAD: CPT | Performed by: RADIOLOGY

## 2025-04-21 PROCEDURE — 77063 BREAST TOMOSYNTHESIS BI: CPT | Performed by: RADIOLOGY

## 2025-04-22 ENCOUNTER — RESULTS FOLLOW-UP (OUTPATIENT)
Dept: ONCOLOGY | Facility: CLINIC | Age: 50
End: 2025-04-22
Payer: COMMERCIAL

## 2025-04-22 DIAGNOSIS — R92.8 ABNORMALITY OF RIGHT BREAST ON SCREENING MAMMOGRAM: Primary | ICD-10-CM

## 2025-04-23 NOTE — TELEPHONE ENCOUNTER
Called pt and informed her that she is in need of a diagnostic mammogram. She v/u. Order placed and message sent to scheduling.

## 2025-04-25 DIAGNOSIS — R92.1 BREAST CALCIFICATION, RIGHT: Primary | ICD-10-CM

## 2025-05-05 ENCOUNTER — PRE-ADMISSION TESTING (OUTPATIENT)
Dept: PREADMISSION TESTING | Facility: HOSPITAL | Age: 50
End: 2025-05-05
Payer: COMMERCIAL

## 2025-05-05 VITALS
OXYGEN SATURATION: 98 % | HEIGHT: 62 IN | DIASTOLIC BLOOD PRESSURE: 68 MMHG | SYSTOLIC BLOOD PRESSURE: 108 MMHG | TEMPERATURE: 98.7 F | BODY MASS INDEX: 29.55 KG/M2 | HEART RATE: 80 BPM | WEIGHT: 160.6 LBS | RESPIRATION RATE: 18 BRPM

## 2025-05-05 LAB
ANION GAP SERPL CALCULATED.3IONS-SCNC: 10.5 MMOL/L (ref 5–15)
BUN SERPL-MCNC: 15 MG/DL (ref 6–20)
BUN/CREAT SERPL: 23.1 (ref 7–25)
CALCIUM SPEC-SCNC: 9.8 MG/DL (ref 8.6–10.5)
CHLORIDE SERPL-SCNC: 101 MMOL/L (ref 98–107)
CO2 SERPL-SCNC: 24.5 MMOL/L (ref 22–29)
CREAT SERPL-MCNC: 0.65 MG/DL (ref 0.57–1)
DEPRECATED RDW RBC AUTO: 39.7 FL (ref 37–54)
EGFRCR SERPLBLD CKD-EPI 2021: 108.8 ML/MIN/1.73
ERYTHROCYTE [DISTWIDTH] IN BLOOD BY AUTOMATED COUNT: 12.3 % (ref 12.3–15.4)
GLUCOSE SERPL-MCNC: 101 MG/DL (ref 65–99)
HCT VFR BLD AUTO: 39.4 % (ref 34–46.6)
HGB BLD-MCNC: 13.1 G/DL (ref 12–15.9)
MCH RBC QN AUTO: 29.6 PG (ref 26.6–33)
MCHC RBC AUTO-ENTMCNC: 33.2 G/DL (ref 31.5–35.7)
MCV RBC AUTO: 88.9 FL (ref 79–97)
PLATELET # BLD AUTO: 381 10*3/MM3 (ref 140–450)
PMV BLD AUTO: 8.5 FL (ref 6–12)
POTASSIUM SERPL-SCNC: 4.5 MMOL/L (ref 3.5–5.2)
RBC # BLD AUTO: 4.43 10*6/MM3 (ref 3.77–5.28)
SODIUM SERPL-SCNC: 136 MMOL/L (ref 136–145)
WBC NRBC COR # BLD AUTO: 9.66 10*3/MM3 (ref 3.4–10.8)

## 2025-05-05 PROCEDURE — 85027 COMPLETE CBC AUTOMATED: CPT

## 2025-05-05 PROCEDURE — 80048 BASIC METABOLIC PNL TOTAL CA: CPT

## 2025-05-05 PROCEDURE — 36415 COLL VENOUS BLD VENIPUNCTURE: CPT

## 2025-05-05 NOTE — DISCHARGE INSTRUCTIONS
Take the following medications the morning of surgery: OMEPRAZOLE      If you are on prescription narcotic pain medication to control your pain you may also take that medication the morning of surgery.      General Instructions:     Do not eat solid food after midnight the night before surgery.  Clear liquids day of surgery are allowed but must be stopped at least two hours before your hospital arrival time.       Allowed clear liquids      Water, sodas, and tea or coffee with no cream or milk added.       12 to 20 ounces of a clear liquid that contains carbohydrates is recommended.  If non-diabetic, have Gatorade or Powerade.  If diabetic, have G2 or Powerade Zero.     Do not have liquids red in color.  Do not consume chicken, beef, pork or vegetable broth or bouillon cubes of any variety as they are not considered clear liquids and are not allowed.      Infants may have breast milk up to four hours before surgery.  Infants drinking formula may drink formula up to six hours before surgery.   Patients who avoid smoking, chewing tobacco and alcohol for 4 weeks prior to surgery have a reduced risk of post-operative complications.  Quit smoking as many days before surgery as you can.  Do not smoke, use chewing tobacco or drink alcohol the day of surgery.   If applicable bring your C-PAP/ BI-PAP machine in with you to preop day of surgery.  Bring any papers given to you in the doctor’s office.  Wear clean comfortable clothes.  Do not wear contact lenses, false eyelashes or make-up.  Bring a case for your glasses.   Bring crutches or walker if applicable.  Remove all piercings.  Leave jewelry and any other valuables at home.  Hair extensions with metal clips must be removed prior to surgery.  The Pre-Admission Testing nurse will instruct you to bring medications if unable to obtain an accurate list in Pre-Admission Testing.    Day of surgery you will need to let the preoperative nurse know the last time you took each of  your medications.  To ensure a safe environment for patients and staff, we kindly ask that children under the age of 16 not accompany patients.  If you must bring a dependent child or dependent adult please ensure a responsible adult, other than yourself, is present to supervise them.      If you were given a blood bank ID arm band remember to bring it with you the day of surgery.    Preventing a Surgical Site Infection:  For 2 to 3 days before surgery, avoid shaving with a razor because the razor can irritate skin and make it easier to develop an infection.    Any areas of open skin can increase the risk of a post-operative wound infection by allowing bacteria to enter and travel throughout the body.  Notify your surgeon if you have any skin wounds / rashes even if it is not near the expected surgical site.  The area will need assessed to determine if surgery should be delayed until it is healed.  The night prior to surgery shower using a fresh bar of anti-bacterial soap (such as Dial) and clean washcloth.  Sleep in a clean bed with clean clothing.  Do not allow pets to sleep with you.  Shower on the morning of surgery using a fresh bar of anti-bacterial soap (such as Dial) and clean washcloth.  Dry with a clean towel and dress in clean clothing.  Ask your surgeon if you will be receiving antibiotics prior to surgery.  Make sure you, your family, and all healthcare providers clean their hands with soap and water or an alcohol based hand  before caring for you or your wound.    Day of surgery:  Your arrival time is approximately two hours before your scheduled surgery time.  Please note if you have an early arrival time the surgery doors do not open before 5:00 AM.  Upon arrival, a Pre-op nurse and Anesthesiologist will review your health history, obtain vital signs, and answer questions you may have.  The only belongings needed at this time will be a list of your home medications and if applicable your  C-PAP/BI-PAP machine.  A Pre-op nurse will start an IV and you may receive medication in preparation for surgery, including something to help you relax.     Please be aware that surgery does come with discomfort.  We want to make every effort to control your discomfort so please discuss any uncontrolled symptoms with your nurse.   Your doctor will most likely have prescribed pain medications.      If you are going home after surgery you will receive individualized written care instructions before being discharged.  A responsible adult must drive you to and from the hospital on the day of your surgery and ideally stay with you through the night.   .  Discharge prescriptions can be filled by the hospital pharmacy during regular pharmacy hours.  If you are having surgery late in the day/evening your prescription may be e-prescribed to your pharmacy.  Please verify your pharmacy hours or chose a 24 hour pharmacy to avoid not having access to your prescription because your pharmacy has closed for the day.    If you are staying overnight following surgery, you will be transported to your hospital room following the recovery period.  [facility] has all private rooms.    If you have any questions please call Pre-Admission Testing at [phone].  Deductibles and co-payments are collected on the day of service. Please be prepared to pay the required co-pay, deductible or deposit on the day of service as defined by your plan.    Call your surgeon immediately if you experience any of the following symptoms:  Sore Throat  Shortness of Breath or difficulty breathing  Cough  Chills  Body soreness or muscle pain  Headache  Fever  New loss of taste or smell  Do not arrive for your surgery ill.  Your procedure will need to be rescheduled to another time.  You will need to call your physician before the day of surgery to avoid any unnecessary exposure to hospital staff as well as other patients.

## 2025-05-08 ENCOUNTER — APPOINTMENT (OUTPATIENT)
Dept: WOMENS IMAGING | Facility: HOSPITAL | Age: 50
End: 2025-05-08
Payer: COMMERCIAL

## 2025-05-08 PROCEDURE — 77061 BREAST TOMOSYNTHESIS UNI: CPT | Performed by: RADIOLOGY

## 2025-05-08 PROCEDURE — 77065 DX MAMMO INCL CAD UNI: CPT | Performed by: RADIOLOGY

## 2025-05-08 PROCEDURE — G0279 TOMOSYNTHESIS, MAMMO: HCPCS | Performed by: RADIOLOGY

## 2025-05-09 ENCOUNTER — TELEPHONE (OUTPATIENT)
Dept: ONCOLOGY | Facility: CLINIC | Age: 50
End: 2025-05-09
Payer: COMMERCIAL

## 2025-05-09 DIAGNOSIS — R92.8 ABNORMAL MAMMOGRAM OF RIGHT BREAST: Primary | ICD-10-CM

## 2025-05-09 NOTE — TELEPHONE ENCOUNTER
Pt called stating that she is needing to have a breast biopsy on Monday. They have already scheduled at [facility]. Order for breast biopsy placed and pt informed she should be able to proceed. She then advised RN she is also having a vascular procedure on Monday and wanted to make sure she is OK to do both. Advised her that she should call vascular to make sure. She v/u.

## 2025-05-09 NOTE — TELEPHONE ENCOUNTER
Caller: Roxy Calvert    Relationship: Self    Best call back number: [phone]    What is the best time to reach you: ANY    Who are you requesting to speak with (clinical staff, provider,  specific staff member): CLINICAL    What was the call regarding: ROXY IS CALLING STATES SHE HAD HER MAMMO YESTERDAY AND THEY ARE RECOMMENDING A STEREOTATIC BIOPSY OF THE RIGHT BREAST  SHE IS WANTING TO GET THAT ORDER DUE TO THEY HAVE AN OPENING ON MONDAY      [facility] - [facility]   FAX# [phone]    RESULTS ARE IN EPIC

## 2025-05-12 ENCOUNTER — HOSPITAL ENCOUNTER (OUTPATIENT)
Facility: HOSPITAL | Age: 50
Setting detail: HOSPITAL OUTPATIENT SURGERY
Discharge: HOME | End: 2025-05-12
Attending: SURGERY | Admitting: SURGERY
Payer: COMMERCIAL

## 2025-05-12 ENCOUNTER — ANESTHESIA EVENT (OUTPATIENT)
Dept: PERIOP | Facility: HOSPITAL | Age: 50
End: 2025-05-12
Payer: COMMERCIAL

## 2025-05-12 ENCOUNTER — ANESTHESIA (OUTPATIENT)
Dept: PERIOP | Facility: HOSPITAL | Age: 50
End: 2025-05-12
Payer: COMMERCIAL

## 2025-05-12 VITALS
RESPIRATION RATE: 16 BRPM | SYSTOLIC BLOOD PRESSURE: 127 MMHG | DIASTOLIC BLOOD PRESSURE: 58 MMHG | HEART RATE: 65 BPM | HEIGHT: 62 IN | BODY MASS INDEX: 29.44 KG/M2 | TEMPERATURE: 98.8 F | OXYGEN SATURATION: 100 % | WEIGHT: 160 LBS

## 2025-05-12 DIAGNOSIS — I83.811 VARICOSE VEINS OF RIGHT LOWER EXTREMITY WITH PAIN: ICD-10-CM

## 2025-05-12 DIAGNOSIS — R22.9 NODULE, SUBCUTANEOUS: Primary | ICD-10-CM

## 2025-05-12 DIAGNOSIS — R22.41 SUBCUTANEOUS MASS OF RIGHT LOWER EXTREMITY: ICD-10-CM

## 2025-05-12 DIAGNOSIS — I82.811 EMBOLISM AND THROMBOSIS OF SUPERFICIAL VEIN OF RIGHT LOWER EXTREMITY: ICD-10-CM

## 2025-05-12 PROCEDURE — 25010000002 FAMOTIDINE 10 MG/ML SOLUTION: Performed by: ANESTHESIOLOGY

## 2025-05-12 PROCEDURE — 25810000003 LACTATED RINGERS PER 1000 ML: Performed by: ANESTHESIOLOGY

## 2025-05-12 PROCEDURE — 25010000002 FENTANYL CITRATE (PF) 50 MCG/ML SOLUTION: Performed by: NURSE ANESTHETIST, CERTIFIED REGISTERED

## 2025-05-12 PROCEDURE — 25010000002 PROPOFOL 1000 MG/100ML EMULSION: Performed by: NURSE ANESTHETIST, CERTIFIED REGISTERED

## 2025-05-12 PROCEDURE — 88305 TISSUE EXAM BY PATHOLOGIST: CPT | Performed by: SURGERY

## 2025-05-12 PROCEDURE — 25010000002 ONDANSETRON PER 1 MG: Performed by: NURSE ANESTHETIST, CERTIFIED REGISTERED

## 2025-05-12 PROCEDURE — 25010000002 KETOROLAC TROMETHAMINE PER 15 MG: Performed by: NURSE ANESTHETIST, CERTIFIED REGISTERED

## 2025-05-12 PROCEDURE — 25010000002 CEFAZOLIN PER 500 MG: Performed by: SURGERY

## 2025-05-12 PROCEDURE — 81025 URINE PREGNANCY TEST: CPT | Performed by: SURGERY

## 2025-05-12 PROCEDURE — 25010000002 PROPOFOL 200 MG/20ML EMULSION: Performed by: NURSE ANESTHETIST, CERTIFIED REGISTERED

## 2025-05-12 PROCEDURE — 25010000002 BUPIVACAINE (PF) 0.25 % SOLUTION 30 ML VIAL: Performed by: SURGERY

## 2025-05-12 PROCEDURE — 25010000002 LIDOCAINE 1 % SOLUTION 20 ML VIAL: Performed by: SURGERY

## 2025-05-12 PROCEDURE — 25010000002 LIDOCAINE 2% SOLUTION: Performed by: NURSE ANESTHETIST, CERTIFIED REGISTERED

## 2025-05-12 PROCEDURE — 25010000002 MIDAZOLAM PER 1 MG: Performed by: ANESTHESIOLOGY

## 2025-05-12 RX ORDER — KETOROLAC TROMETHAMINE 30 MG/ML
INJECTION, SOLUTION INTRAMUSCULAR; INTRAVENOUS AS NEEDED
Status: DISCONTINUED | OUTPATIENT
Start: 2025-05-12 | End: 2025-05-12 | Stop reason: SURG

## 2025-05-12 RX ORDER — MIDAZOLAM HYDROCHLORIDE 1 MG/ML
1 INJECTION, SOLUTION INTRAMUSCULAR; INTRAVENOUS
Status: COMPLETED | OUTPATIENT
Start: 2025-05-12 | End: 2025-05-12

## 2025-05-12 RX ORDER — LIDOCAINE HYDROCHLORIDE 10 MG/ML
0.5 INJECTION, SOLUTION INFILTRATION; PERINEURAL ONCE AS NEEDED
Status: DISCONTINUED | OUTPATIENT
Start: 2025-05-12 | End: 2025-05-12 | Stop reason: HOSPADM

## 2025-05-12 RX ORDER — LIDOCAINE HYDROCHLORIDE 20 MG/ML
INJECTION, SOLUTION INFILTRATION; PERINEURAL AS NEEDED
Status: DISCONTINUED | OUTPATIENT
Start: 2025-05-12 | End: 2025-05-12 | Stop reason: SURG

## 2025-05-12 RX ORDER — SODIUM CHLORIDE 0.9 % (FLUSH) 0.9 %
3-10 SYRINGE (ML) INJECTION AS NEEDED
Status: DISCONTINUED | OUTPATIENT
Start: 2025-05-12 | End: 2025-05-12 | Stop reason: HOSPADM

## 2025-05-12 RX ORDER — FENTANYL CITRATE 50 UG/ML
50 INJECTION, SOLUTION INTRAMUSCULAR; INTRAVENOUS ONCE AS NEEDED
Status: DISCONTINUED | OUTPATIENT
Start: 2025-05-12 | End: 2025-05-12 | Stop reason: HOSPADM

## 2025-05-12 RX ORDER — SODIUM CHLORIDE 0.9 % (FLUSH) 0.9 %
3 SYRINGE (ML) INJECTION EVERY 12 HOURS SCHEDULED
Status: DISCONTINUED | OUTPATIENT
Start: 2025-05-12 | End: 2025-05-12 | Stop reason: HOSPADM

## 2025-05-12 RX ORDER — PROPOFOL 10 MG/ML
INJECTION, EMULSION INTRAVENOUS AS NEEDED
Status: DISCONTINUED | OUTPATIENT
Start: 2025-05-12 | End: 2025-05-12 | Stop reason: SURG

## 2025-05-12 RX ORDER — TRAMADOL HYDROCHLORIDE 50 MG/1
50 TABLET ORAL EVERY 6 HOURS PRN
Qty: 4 TABLET | Refills: 0 | Status: SHIPPED | OUTPATIENT
Start: 2025-05-12

## 2025-05-12 RX ORDER — ONDANSETRON 2 MG/ML
INJECTION INTRAMUSCULAR; INTRAVENOUS AS NEEDED
Status: DISCONTINUED | OUTPATIENT
Start: 2025-05-12 | End: 2025-05-12 | Stop reason: SURG

## 2025-05-12 RX ORDER — SODIUM CHLORIDE, SODIUM LACTATE, POTASSIUM CHLORIDE, CALCIUM CHLORIDE 600; 310; 30; 20 MG/100ML; MG/100ML; MG/100ML; MG/100ML
9 INJECTION, SOLUTION INTRAVENOUS CONTINUOUS
Status: DISCONTINUED | OUTPATIENT
Start: 2025-05-12 | End: 2025-05-12 | Stop reason: HOSPADM

## 2025-05-12 RX ORDER — PROPOFOL 10 MG/ML
INJECTION, EMULSION INTRAVENOUS CONTINUOUS PRN
Status: DISCONTINUED | OUTPATIENT
Start: 2025-05-12 | End: 2025-05-12 | Stop reason: SURG

## 2025-05-12 RX ORDER — FAMOTIDINE 10 MG/ML
20 INJECTION, SOLUTION INTRAVENOUS ONCE
Status: COMPLETED | OUTPATIENT
Start: 2025-05-12 | End: 2025-05-12

## 2025-05-12 RX ORDER — FENTANYL CITRATE 50 UG/ML
INJECTION, SOLUTION INTRAMUSCULAR; INTRAVENOUS AS NEEDED
Status: DISCONTINUED | OUTPATIENT
Start: 2025-05-12 | End: 2025-05-12 | Stop reason: SURG

## 2025-05-12 RX ADMIN — FENTANYL CITRATE 25 MCG: 50 INJECTION, SOLUTION INTRAMUSCULAR; INTRAVENOUS at 07:54

## 2025-05-12 RX ADMIN — MIDAZOLAM 1 MG: 1 INJECTION INTRAMUSCULAR; INTRAVENOUS at 06:50

## 2025-05-12 RX ADMIN — FENTANYL CITRATE 25 MCG: 50 INJECTION, SOLUTION INTRAMUSCULAR; INTRAVENOUS at 07:41

## 2025-05-12 RX ADMIN — PROPOFOL 125 MCG/KG/MIN: 10 INJECTION, EMULSION INTRAVENOUS at 07:32

## 2025-05-12 RX ADMIN — SODIUM CHLORIDE, POTASSIUM CHLORIDE, SODIUM LACTATE AND CALCIUM CHLORIDE 9 ML/HR: 600; 310; 30; 20 INJECTION, SOLUTION INTRAVENOUS at 06:47

## 2025-05-12 RX ADMIN — MIDAZOLAM 1 MG: 1 INJECTION INTRAMUSCULAR; INTRAVENOUS at 06:55

## 2025-05-12 RX ADMIN — KETOROLAC TROMETHAMINE 30 MG: 30 INJECTION, SOLUTION INTRAMUSCULAR; INTRAVENOUS at 07:56

## 2025-05-12 RX ADMIN — PROPOFOL INJECTABLE EMULSION 30 MG: 10 INJECTION, EMULSION INTRAVENOUS at 07:42

## 2025-05-12 RX ADMIN — FAMOTIDINE 20 MG: 10 INJECTION, SOLUTION INTRAVENOUS at 06:50

## 2025-05-12 RX ADMIN — PROPOFOL INJECTABLE EMULSION 60 MG: 10 INJECTION, EMULSION INTRAVENOUS at 07:32

## 2025-05-12 RX ADMIN — LIDOCAINE HYDROCHLORIDE 60 MG: 20 INJECTION, SOLUTION INFILTRATION; PERINEURAL at 07:32

## 2025-05-12 RX ADMIN — ONDANSETRON 4 MG: 2 INJECTION, SOLUTION INTRAMUSCULAR; INTRAVENOUS at 07:56

## 2025-05-12 NOTE — OP NOTE
Operative Note  Location: [facility]  Date of Admission:  5/12/2025  OR Date: 5/12/2025    Pre-op Diagnosis:  Painful subcutaneous nodule right ankle    Post-op Diagnosis:  Same    Procedure:   Excision of subcutaneous nodule, right medial ankle, likely segment of great saphenous vein with superficial vein thrombosis    Surgeon: Bernardo Coelho MD    Assistant: Vee Manzo RN, Provided critical assistance in exposure, retraction, and suction that overall decrease blood loss and operative time.    Anesthesia: Monitored Anesthesia Care with local    Staff:   Circulator: Jami Lucas RN  Scrub Person: Ariana Chacon  Assistant: Vee Manzo CSFA was responsible for performing the following activities: Retraction, Closing, and Placing Dressing and their skilled assistance was necessary for the success of this case.    Estimated Blood Loss: minimal    Specimen: Subcutaneous nodule right ankle    Complications: None immediate    Findings: The nodule appeared to be a segment of great saphenous vein adjacent to the medial malleolus, with chronic-appearing superficial vein thrombosis.    Implants: Nothing was implanted during the procedure    Indications: 49-year-old woman with painful nodule right medial ankle, overlying the medial malleolus.  She describes significant discomfort wearing shoes, socks and at night when she tries to sleep.  Submits now for excision.       Procedure:  The patient was brought to the operating room.  Antibiotics were felt not to be indicated.  IV sedation was administered.  The right lower extremity was prepared with ChloraPrep and draped in a sterile fashion.  The skin and subcutaneous tissue surrounding the nodule was infiltrated with local anesthetic.  A transversely oriented incision aligned with the lines of the skin was made and the subcutaneous tissue was dissected.  The great saphenous vein was identified and incompletely compressible at the area.  This was confirmed to be the  nodule in question.  The vein was dissected proximally and distally and ligated.  The vein was excised and removed intact.  The vein was incompressible and there was no residual mass palpated in the wound.  I concluded that this was the problem.  The specimen was sent for permanent histology.  Wound hemostasis was complete.  A single 3-0 Vicryl subcutaneous suture was placed.  The skin was closed with 4-0 Vicryl and dermal adhesive was applied.  At its conclusion the patient had tolerated the procedure well, and without apparent complications.  Sponge and needle counts were correct.  The patient was taken to the recovery area in stable condition.    Bernardo Coelho MD     5/12/2025, 08:13 EDT

## 2025-05-12 NOTE — ANESTHESIA PREPROCEDURE EVALUATION
Anesthesia Evaluation     history of anesthetic complications:  PONV  NPO Solid Status: > 8 hours  NPO Liquid Status: > 2 hours           Airway   Mallampati: III  Small opening and Possible difficult intubation  Dental - normal exam     Pulmonary - negative pulmonary ROS and normal exam   (-) COPD, asthma, sleep apnea, not a smoker    ROS comment: 2020 wedge resection   PE comment: nonlabored  Cardiovascular - normal exam    Rhythm: regular  Rate: normal    (+) hypertension  (-) valvular problems/murmurs, past MI, CAD, dysrhythmias, angina      Neuro/Psych  (+) psychiatric history Anxiety  (-) seizures, TIA, CVA  GI/Hepatic/Renal/Endo    (+) hepatitis (in childhood--presumably Hep A)  (-) GERD, liver disease, no renal disease, diabetes, no thyroid disorder    Musculoskeletal (-) negative ROS    Abdominal    Substance History      OB/GYN          Other   blood dyscrasia (anemia in childhood) anemia,       Other Comment: Monoallelic mutation of EGFR gene                    Anesthesia Plan    ASA 2     MAC     (Doesn't want stadol, had reaction)    Anesthetic plan, risks, benefits, and alternatives have been provided, discussed and informed consent has been obtained with: patient.        CODE STATUS:       
English

## 2025-05-12 NOTE — ANESTHESIA POSTPROCEDURE EVALUATION
Patient: Roxy Calvert    Procedure Summary       Date: 05/12/25 Room / Location: [facility] OR  / [facility] MAIN OR    Anesthesia Start: 0724 Anesthesia Stop:     Procedure: EXCISION of MASS right LOWER EXTREMITY (Right) Diagnosis:       Varicose veins of right lower extremity with pain      Embolism and thrombosis of superficial vein of right lower extremity      Subcutaneous mass of right lower extremity      (Varicose veins of right lower extremity with pain [I83.811])      (Embolism and thrombosis of superficial vein of right lower extremity [I82.811])      (Subcutaneous mass of right lower extremity [R22.41])    Surgeons: Bernardo Coelho MD Provider: Edin Troy MD    Anesthesia Type: MAC ASA Status: 2            Anesthesia Type: MAC    Vitals  Vitals Value Taken Time   /58 05/12/25 09:00   Temp 37.1 °C (98.8 °F) 05/12/25 08:11   Pulse 69 05/12/25 09:03   Resp 16 05/12/25 09:00   SpO2 99 % 05/12/25 09:03   Vitals shown include unfiled device data.        Post Anesthesia Care and Evaluation    Patient location during evaluation: bedside  Patient participation: complete - patient participated  Level of consciousness: awake  Pain management: adequate    Airway patency: patent  Anesthetic complications: No anesthetic complications  PONV Status: none  Cardiovascular status: acceptable  Respiratory status: acceptable  Hydration status: acceptable  Post Neuraxial Block status: Motor and sensory function returned to baseline

## 2025-05-12 NOTE — H&P
"Name: Roxy Calvert ADMIT: 2025   : 1975  PCP: Jagruti Delatorre APRN    MRN: [id number] LOS: 0 days   AGE/SEX: 49 y.o. female  ROOM: [facility]    Pre-operative H&P    Patient Care Team:  Jagruti Delatorre APRN as PCP - General (Family Medicine)  Annette Parekh MD as Consulting Physician (Obstetrics and Gynecology)  Jack Jorgensen MD as Consulting Physician (Allergy and Immunology)  Scott Belle MD as Consulting Physician (Hematology and Oncology)  Gio Burnett III, MD as Referring Physician (Thoracic Surgery)    CC: Painful subcutaneous nodule medial right ankle    History of Present Illness  49-year-old woman with painful subcutaneous nodule medial right ankle.  Having significant discomfort, particularly wearing shoes and at night.  Presents for elective excision    Past Medical History:   Diagnosis Date    Allergic     Allergic rhinitis     Anemia     as child    Anxiety     Encounter for IUD insertion 2020    MIRENA     GERD (gastroesophageal reflux disease)     Gestational hypertension 10/05    H/O Lung nodules     History of hepatitis     AS CHILD    History of medical problems 2020    Lung Biopsy    History of multiple pulmonary nodules     Hypertension     Hypertension in pregnancy     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    Migraine     PONV (postoperative nausea and vomiting)     Preeclampsia 10/05    Urinary tract infection     Vaginal delivery     08 DR PAREKH BABY \"KYDWYN\" 6.12    Varicella 1982     Past Surgical History:   Procedure Laterality Date    BREAST BIOPSY  3/2023, 2023     SECTION  2006 AND     COLONOSCOPY N/A 2022    Procedure: COLONOSCOPY;  Surgeon: Laly Banegas MD;  Location: [facility] OR;  Service: Gastroenterology;  Laterality: N/A;  normal    COLONOSCOPY      COLONOSCOPY      EAR TUBES      Tympanostomy tube placement as a child multiple " times    THORACOSCOPY Left 2020    Procedure: BRONCHOSCOPY, LEFT VIDEO ASSISTED THORACOSCOPY WITH DAVINCI ROBOT ASSISTED LEFT UPPER LOBE WEDGE RESECTION, LEFT LOWER LOBE WEDGE RESECTION, RESECTION OF MEDIASTINAL CYST,  INTERCOSTAL NERVE BLOCKS;  Surgeon: Gio Burnett III, MD;  Location: [facility];  Service: [facility];  Laterality: Left;    TONSILLECTOMY      TYMPANOPLASTY      WISDOM TOOTH EXTRACTION       Family History   Problem Relation Age of Onset    Lung cancer Mother     Osteoporosis Mother             Anxiety disorder Mother     Thyroid disease Mother         Thyroid removed    HIV Father     Hypertension Maternal Grandmother             Coronary artery disease Maternal Grandmother             Osteoporosis Maternal Grandmother             Heart disease Maternal Grandmother          - hx heart attack;  of heart failure    ALS Maternal Grandfather     Hypertension Maternal Grandfather             Coronary artery disease Maternal Grandfather             Coronary artery disease Paternal Grandmother             Heart disease Paternal Grandmother          - heart attack    No Known Problems Paternal Grandfather     No Known Problems Daughter     No Known Problems Son     No Known Problems Maternal Aunt     Breast cancer Maternal Aunt         Great Aunt    Stroke Maternal Aunt             Diabetes Maternal Uncle             Heart attack Maternal Uncle     No Known Problems Paternal Aunt     Colon cancer Paternal Uncle     Diabetes Paternal Uncle             BRCA 1/2 Neg Hx     Endometrial cancer Neg Hx     Ovarian cancer Neg Hx     Malig Hyperthermia Neg Hx      Social History     Tobacco Use    Smoking status: Never     Passive exposure: Never    Smokeless tobacco: Never   Vaping Use    Vaping status: Never Used   Substance Use Topics    Alcohol use: Yes     Alcohol/week: 2.0 standard drinks of  "alcohol     Types: 1 Glasses of wine, 1 Drinks containing 0.5 oz of alcohol per week     Comment: 1 glass of wine or 1 mixed drink per week    Drug use: Never     Medications Prior to Admission   Medication Sig Dispense Refill Last Dose/Taking    fexofenadine (ALLEGRA) 180 MG tablet Take 1 tablet by mouth Daily.   5/10/2025    omeprazole (priLOSEC) 40 MG capsule Take 1 capsule by mouth 2 (Two) Times a Day. 180 capsule 3 5/11/2025 Morning    CALCIUM CITRATE-VITAMIN D3 PO Take 1 tablet by mouth Daily. 1200 MG  HOLD FOR SURGERY   5/10/2025    Cholecalciferol (Vitamin D3) 250 MCG (90996 UT) tablet Take 1 tablet by mouth Daily. HELD   5/10/2025    fluticasone (FLONASE) 50 MCG/ACT nasal spray 2 sprays into the nostril(s) as directed by provider Daily. 16 g 6 5/10/2025    levonorgestrel (Mirena, 52 MG,) 20 MCG/24HR IUD 1 each by Intrauterine route 1 (One) Time.       losartan (COZAAR) 100 MG tablet TAKE 1 TABLET BY MOUTH EVERY DAY (Patient taking differently: Take 1 tablet by mouth Daily. PT TO HOLD  AM OF SURGERY) 90 tablet 0 5/10/2025    Multiple Vitamins-Minerals (MULTIVITAMIN ADULT PO) Take 1 tablet by mouth Daily. HOLD FOR SURGERY   5/10/2025    Omega-3 Fatty Acids (FISH OIL) 1200 MG capsule capsule Take 1 capsule by mouth Daily. EPA AND 900MG DHA  HOLD FOR SURGERY   5/10/2025     sodium chloride, 3 mL, Intravenous, Q12H      fentanyl    lidocaine    sodium chloride    sodium chloride 1,000 mL with ceFAZolin 2 g irrigation  Stadol [butorphanol] and Latex    Vital Signs and Labs:  Vital Signs Patient Vitals for the past 24 hrs:   BP Temp Temp src Pulse Resp SpO2 Height Weight   05/12/25 0631 136/80 98.8 °F (37.1 °C) Oral 60 16 99 % 156.2 cm (61.5\") 72.6 kg (160 lb)     BMI:  Body mass index is 29.74 kg/m².    Physical Exam: No visible varicose veins.  Palpable right pedal artery pulses.  Subcutaneous nodule over right medial malleolus, possibly representing chronic superficial vein thrombosis or glomus tumor.  A " saphenous nerve neuroma is also considered    CBC    Results from last 7 days   Lab Units 05/05/25  1626   WBC 10*3/mm3 9.66   HEMOGLOBIN g/dL 13.1   PLATELETS 10*3/mm3 381     BMP   Results from last 7 days   Lab Units 05/05/25  1626   SODIUM mmol/L 136   POTASSIUM mmol/L 4.5   CHLORIDE mmol/L 101   CO2 mmol/L 24.5   BUN mg/dL 15   CREATININE mg/dL 0.65   GLUCOSE mg/dL 101*     Cr Clearance Estimated Creatinine Clearance: 97.6 mL/min (by C-G formula based on SCr of 0.65 mg/dL).    Active Hospital Problems    Diagnosis  POA    Embolism and thrombosis of superficial vein of right lower extremity [I82.811]  Unknown    Subcutaneous mass of right lower extremity [R22.41]  Unknown    Varicose veins of right lower extremity with pain [I83.811]  Unknown      Resolved Hospital Problems   No resolved problems to display.     Assessment & Plan       Varicose veins of right lower extremity with pain    Embolism and thrombosis of superficial vein of right lower extremity    Subcutaneous mass of right lower extremity    49 y.o. female with a painful subcutaneous nodule right medial ankle, presents for elective excision.  Discussed, questions answered.  Informed consent.    I discussed my findings and recommendations with patient and family.    Bernardo Coelho MD  05/12/25, 07:07 EDT    Office contact: [phone]

## 2025-05-14 ENCOUNTER — TELEPHONE (OUTPATIENT)
Age: 50
End: 2025-05-14

## 2025-05-14 LAB
CYTO UR: NORMAL
LAB AP CASE REPORT: NORMAL
LAB AP CLINICAL INFORMATION: NORMAL
LAB AP DIAGNOSIS COMMENT: NORMAL
PATH REPORT.FINAL DX SPEC: NORMAL
PATH REPORT.GROSS SPEC: NORMAL

## 2025-05-14 NOTE — TELEPHONE ENCOUNTER
Patient called stating that she had a procedure done and was wondering when the pathology report would come back.     Call back is [phone]

## 2025-05-22 ENCOUNTER — LAB REQUISITION (OUTPATIENT)
Dept: LAB | Facility: HOSPITAL | Age: 50
End: 2025-05-22
Payer: COMMERCIAL

## 2025-05-22 ENCOUNTER — APPOINTMENT (OUTPATIENT)
Dept: WOMENS IMAGING | Facility: HOSPITAL | Age: 50
End: 2025-05-22
Payer: COMMERCIAL

## 2025-05-22 ENCOUNTER — HOSPITAL ENCOUNTER (OUTPATIENT)
Dept: MAMMOGRAPHY | Facility: HOSPITAL | Age: 50
Discharge: HOME | End: 2025-05-22
Payer: COMMERCIAL

## 2025-05-22 DIAGNOSIS — R92.1 CALCIFICATION OF RIGHT BREAST: ICD-10-CM

## 2025-05-22 DIAGNOSIS — R92.1 MAMMOGRAPHIC CALCIFICATION FOUND ON DIAGNOSTIC IMAGING OF BREAST: ICD-10-CM

## 2025-05-22 PROCEDURE — 88305 TISSUE EXAM BY PATHOLOGIST: CPT | Performed by: NURSE PRACTITIONER

## 2025-05-22 PROCEDURE — C1819 TISSUE LOCALIZATION-EXCISION: HCPCS | Performed by: RADIOLOGY

## 2025-05-22 PROCEDURE — A4648 IMPLANTABLE TISSUE MARKER: HCPCS | Performed by: RADIOLOGY

## 2025-05-22 PROCEDURE — 76098 X-RAY EXAM SURGICAL SPECIMEN: CPT

## 2025-05-22 PROCEDURE — 19081 BX BREAST 1ST LESION STRTCTC: CPT | Performed by: RADIOLOGY

## 2025-05-27 ENCOUNTER — RESULTS FOLLOW-UP (OUTPATIENT)
Dept: LAB | Facility: HOSPITAL | Age: 50
End: 2025-05-27
Payer: COMMERCIAL

## 2025-05-27 NOTE — TELEPHONE ENCOUNTER
Called and informed pt of results. She v/u.       ----- Message from Scott Belle sent at 5/27/2025  8:46 AM EDT -----  Please contact this patient, breast biopsy results negative.  Thanks MIKEL  ----- Message -----  From: Michelle Ribeiro APRN  Sent: 5/27/2025   8:32 AM EDT  To: Scott Belle MD      ----- Message -----  From: Lab, Background User  Sent: 5/23/2025   2:07 PM EDT  To: MAGDA Lowry

## 2025-05-28 LAB
CYTO UR: NORMAL
DX PRELIMINARY: NORMAL
LAB AP CASE REPORT: NORMAL
LAB AP CLINICAL INFORMATION: NORMAL
LAB AP INTRADEPARTMENTAL CONSULT: NORMAL
PATH REPORT.FINAL DX SPEC: NORMAL
PATH REPORT.GROSS SPEC: NORMAL

## 2025-06-23 NOTE — PROGRESS NOTES
Subjective   Chief Complaint   Patient presents with    Hypertension    GI Problem       History of Present Illness   49 y.o. female presents for follow up regarding HTN and GERD. Denies chest pain or dyspnea. GERD much improved. Exercising: riding bike or elliptical for the last few weeks. Strength training full body. She has not had her Losartan this am. Dryness noted at hairline with itchiness behind right ear.      Patient Active Problem List   Diagnosis    Hypertension    Anxiety    Lung nodules    Monoallelic mutation of EGFR gene    Gastroesophageal reflux disease    Varicose veins of right lower extremity with pain    Embolism and thrombosis of superficial vein of right lower extremity    Subcutaneous mass of right lower extremity    Nodule, subcutaneous    Right leg pain       Allergies   Allergen Reactions    Stadol [Butorphanol] Other (See Comments)     BRADYCARDIA    Latex Swelling     SKIN REDNESS AND SWELLING       Current Outpatient Medications on File Prior to Visit   Medication Sig Dispense Refill    CALCIUM CITRATE-VITAMIN D3 PO Take 1 tablet by mouth Daily. 1200 MG  HOLD FOR SURGERY      Cholecalciferol (Vitamin D3) 250 MCG (36570 UT) tablet Take 1 tablet by mouth Daily. HELD      fexofenadine (ALLEGRA) 180 MG tablet Take 1 tablet by mouth Daily.      fluticasone (FLONASE) 50 MCG/ACT nasal spray 2 sprays into the nostril(s) as directed by provider Daily. 16 g 6    levonorgestrel (Mirena, 52 MG,) 20 MCG/24HR IUD 1 each by Intrauterine route 1 (One) Time.      losartan (COZAAR) 100 MG tablet TAKE 1 TABLET BY MOUTH EVERY DAY 90 tablet 0    Multiple Vitamins-Minerals (MULTIVITAMIN ADULT PO) Take 1 tablet by mouth Daily. HOLD FOR SURGERY      Omega-3 Fatty Acids (FISH OIL) 1200 MG capsule capsule Take 1 capsule by mouth Daily. EPA AND 900MG DHA  HOLD FOR SURGERY      omeprazole (priLOSEC) 40 MG capsule Take 1 capsule by mouth 2 (Two) Times a Day. 180 capsule 3     No current facility-administered  "medications on file prior to visit.       Past Medical History:   Diagnosis Date    Allergic rhinitis     Anemia     as child    Anxiety     Encounter for IUD insertion 2020    MIRENA     Gestational hypertension 10/05    H/O Lung nodules     History of hepatitis     AS CHILD    History of medical problems 2020    Lung Biopsy    History of multiple pulmonary nodules     Hypertension     Hypertension in pregnancy     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    Migraine 2018    Preeclampsia 10/05    Vaginal delivery     08 DR TAN BABY \"KYDWYN\" 6.12    Varicella 1982       Family History   Problem Relation Age of Onset    Lung cancer Mother     Osteoporosis Mother             Anxiety disorder Mother     Thyroid disease Mother         Thyroid removed    HIV Father     Hypertension Maternal Grandmother             Coronary artery disease Maternal Grandmother             Osteoporosis Maternal Grandmother             Heart disease Maternal Grandmother          - hx heart attack;  of heart failure    ALS Maternal Grandfather     Hypertension Maternal Grandfather             Coronary artery disease Maternal Grandfather             Coronary artery disease Paternal Grandmother             Heart disease Paternal Grandmother          - heart attack    No Known Problems Paternal Grandfather     No Known Problems Daughter     No Known Problems Son     No Known Problems Maternal Aunt     Breast cancer Maternal Aunt         Great Aunt    Stroke Maternal Aunt             Diabetes Maternal Uncle             Heart attack Maternal Uncle     No Known Problems Paternal Aunt     Colon cancer Paternal Uncle     Diabetes Paternal Uncle             BRCA / Neg Hx     Endometrial cancer Neg Hx     Ovarian cancer Neg Hx     Malig Hyperthermia Neg Hx        Social History     Socioeconomic History    Marital status:  "     Spouse name: Jack Calderón    Number of children: 2    Years of education: College   Tobacco Use    Smoking status: Never     Passive exposure: Never    Smokeless tobacco: Never   Vaping Use    Vaping status: Never Used   Substance and Sexual Activity    Alcohol use: Yes     Alcohol/week: 2.0 standard drinks of alcohol     Types: 1 Glasses of wine, 1 Drinks containing 0.5 oz of alcohol per week     Comment: 1 glass of wine or 1 mixed drink per week    Drug use: Never    Sexual activity: Yes     Partners: Male     Birth control/protection: I.U.D.       Past Surgical History:   Procedure Laterality Date    BREAST BIOPSY  3/2023, 2023     SECTION  2006 AND     COLONOSCOPY N/A 2022    Procedure: COLONOSCOPY;  Surgeon: Laly Banegas MD;  Location: [facility];  Service: Gastroenterology;  Laterality: N/A;  normal    COLONOSCOPY      COLONOSCOPY      EAR TUBES      Tympanostomy tube placement as a child multiple times    LEG EXCISION LESION/CYST Right 2025    Procedure: EXCISION of MASS right LOWER EXTREMITY;  Surgeon: Bernardo Coelho MD;  Location: [facility] OR;  Service: Vascular;  Laterality: Right;    THORACOSCOPY Left 2020    Procedure: BRONCHOSCOPY, LEFT VIDEO ASSISTED THORACOSCOPY WITH DAVINCI ROBOT ASSISTED LEFT UPPER LOBE WEDGE RESECTION, LEFT LOWER LOBE WEDGE RESECTION, RESECTION OF MEDIASTINAL CYST,  INTERCOSTAL NERVE BLOCKS;  Surgeon: Gio Burnett III, MD;  Location: [facility] OR;  Service: DaVinci;  Laterality: Left;    TONSILLECTOMY      TYMPANOPLASTY      WISDOM TOOTH EXTRACTION         The following portions of the patient's history were reviewed and updated as appropriate: problem list, allergies, current medications, past medical history, and past social history.    Review of Systems    Immunization History   Administered Date(s) Administered    Influenza, Unspecified 10/21/2021, 10/06/2022, 10/15/2024    Tdap 2024     "flucelvax quad pfs =>4 YRS 10/13/2020, 10/21/2021       Objective   Vitals:    06/26/25 0815   BP: 128/84   Pulse: 64   Resp: 14   Temp: 97.8 °F (36.6 °C)   Weight: 73.9 kg (163 lb)   Height: 156.2 cm (61.5\")     Body mass index is 30.3 kg/m².  Physical Exam  Vitals reviewed.   Constitutional:       Appearance: Normal appearance. She is well-developed. She is obese.   HENT:      Head: Normocephalic and atraumatic.   Cardiovascular:      Rate and Rhythm: Normal rate and regular rhythm.      Heart sounds: Normal heart sounds, S1 normal and S2 normal.   Pulmonary:      Effort: Pulmonary effort is normal.      Breath sounds: Normal breath sounds.   Skin:     General: Skin is warm and dry.      Comments: 4 salmon colored macules behind right ear with dryness and flaking at hairline.    Neurological:      Mental Status: She is alert.   Psychiatric:         Mood and Affect: Mood normal.         Behavior: Behavior normal.         Procedures    Assessment & Plan   Diagnoses and all orders for this visit:    1. Primary hypertension (Primary)  Comments:  /84; she has not taken Losartan yet this am. She will check BP over the next 2 weeks and send me her readings. No changes at this time. If HBP readings co    2. Seborrheic dermatitis  Comments:  Desonide cream ointment to affected areas bid for the next 2 weeks. Do not use steroid cream on face.  Orders:  -     desonide (DESOWEN) 0.05 % ointment; Apply to affected area 1-2x daily for for 2 weeks.  Dispense: 15 g; Refill: 1    3. Screening for metabolic disorder  -     Comprehensive Metabolic Panel; Future  -     Lipid Panel With LDL / HDL Ratio; Future    Records reviewed include previous OV with myself, Dr. Coelho as well as labs and biopsy noted with Dr. Belle.     Return in about 25 weeks (around 12/18/2025) for Lab B4 FUP.           "

## 2025-06-26 ENCOUNTER — OFFICE VISIT (OUTPATIENT)
Dept: INTERNAL MEDICINE | Facility: CLINIC | Age: 50
End: 2025-06-26
Payer: COMMERCIAL

## 2025-06-26 VITALS
HEIGHT: 61 IN | WEIGHT: 163 LBS | HEART RATE: 64 BPM | BODY MASS INDEX: 30.78 KG/M2 | SYSTOLIC BLOOD PRESSURE: 128 MMHG | RESPIRATION RATE: 14 BRPM | DIASTOLIC BLOOD PRESSURE: 84 MMHG | TEMPERATURE: 97.8 F

## 2025-06-26 DIAGNOSIS — I10 PRIMARY HYPERTENSION: Primary | Chronic | ICD-10-CM

## 2025-06-26 DIAGNOSIS — L21.9 SEBORRHEIC DERMATITIS: ICD-10-CM

## 2025-06-26 DIAGNOSIS — Z13.228 SCREENING FOR METABOLIC DISORDER: ICD-10-CM

## 2025-06-26 PROCEDURE — 99214 OFFICE O/P EST MOD 30 MIN: CPT | Performed by: NURSE PRACTITIONER

## 2025-06-26 RX ORDER — DESONIDE 0.5 MG/G
OINTMENT TOPICAL
Qty: 15 G | Refills: 1 | Status: SHIPPED | OUTPATIENT
Start: 2025-06-26

## 2025-06-29 DIAGNOSIS — J30.9 ALLERGIC RHINITIS, UNSPECIFIED SEASONALITY, UNSPECIFIED TRIGGER: ICD-10-CM

## 2025-06-30 RX ORDER — FLUTICASONE PROPIONATE 50 MCG
2 SPRAY, SUSPENSION (ML) NASAL DAILY
Qty: 16 G | Refills: 6 | Status: SHIPPED | OUTPATIENT
Start: 2025-06-30

## 2025-07-05 DIAGNOSIS — I10 ESSENTIAL HYPERTENSION: Chronic | ICD-10-CM

## 2025-07-07 RX ORDER — LOSARTAN POTASSIUM 100 MG/1
100 TABLET ORAL DAILY
Qty: 90 TABLET | Refills: 0 | Status: SHIPPED | OUTPATIENT
Start: 2025-07-07

## (undated) DEVICE — ARM DRAPE

## (undated) DEVICE — GLV SURG BIOGEL LTX PF 6 1/2

## (undated) DEVICE — NDL HYPO PRECISIONGLIDE REG 20G 1 1/2

## (undated) DEVICE — DRAPE,REIN 53X77,STERILE: Brand: MEDLINE

## (undated) DEVICE — SPNG LAP CIGARETTE KITTNER 5MM STRL PK/5

## (undated) DEVICE — ENDOPATH XCEL BLADELESS TROCARS WITH STABILITY SLEEVES: Brand: ENDOPATH XCEL

## (undated) DEVICE — CANN NASL CO2 TRULINK W/O2 A/

## (undated) DEVICE — TBG INSUFFLATION LUER LOCK: Brand: MEDLINE INDUSTRIES, INC.

## (undated) DEVICE — GOWN ISOL W/THUMB UNIV BLU BX/15

## (undated) DEVICE — 3M™ DURAPORE™ SURGICAL TAPE 1538-3, 3 INCH X 10 YARD (7,5CM X 9,1M), 4 ROLLS/BOX: Brand: 3M™ DURAPORE™

## (undated) DEVICE — ANTIBACTERIAL UNDYED BRAIDED (POLYGLACTIN 910), SYNTHETIC ABSORBABLE SUTURE: Brand: COATED VICRYL

## (undated) DEVICE — PATIENT RETURN ELECTRODE, SINGLE-USE, CONTACT QUALITY MONITORING, ADULT, WITH 9FT CORD, FOR PATIENTS WEIGING OVER 33LBS. (15KG): Brand: MEGADYNE

## (undated) DEVICE — Device

## (undated) DEVICE — KT ORCA ORCAPOD DISP STRL

## (undated) DEVICE — SYR LUERLOK 20CC BX/50

## (undated) DEVICE — EXTENSION SET, MALE LUER LOCK ADAPTER WITH RETRACTABLE COLLAR

## (undated) DEVICE — TRAP FLD MINIVAC MEGADYNE 100ML

## (undated) DEVICE — 3M™ STERI-DRAPE™ INSTRUMENT POUCH 1018L: Brand: STERI-DRAPE™

## (undated) DEVICE — LP VESL MAXI 2.5X1MM RED 2PK

## (undated) DEVICE — COLUMN DRAPE

## (undated) DEVICE — BANDAGE,GAUZE,BULKEE II,4.5"X4.1YD,STRL: Brand: MEDLINE

## (undated) DEVICE — C-MAC® GUIDE: Brand: C-MAC / DBLADE

## (undated) DEVICE — BG ISOL DRWSTR INVISISHIELD 20X20IN

## (undated) DEVICE — MARKR SKIN W/RULR 2TP

## (undated) DEVICE — PK ORTHO MINOR 40

## (undated) DEVICE — SUT VIC 0 CT 36IN J958H

## (undated) DEVICE — Device: Brand: TISSUE RETRIEVAL SYSTEM

## (undated) DEVICE — SPNG GZ WOVN 4X4IN 12PLY 10/BX STRL

## (undated) DEVICE — SPONGE,LAP,18"X18",DLX,XR,ST,5/PK,40/PK: Brand: MEDLINE

## (undated) DEVICE — TOTAL TRAY, 16FR 10ML SIL FOLEY, URN: Brand: MEDLINE

## (undated) DEVICE — CANNULA SEAL

## (undated) DEVICE — ADHS SKIN DERMABOND TOP ADVANCED

## (undated) DEVICE — FLEX ADVANTAGE 1500CC: Brand: FLEX ADVANTAGE

## (undated) DEVICE — SUT SILK 0 TIES 30IN A306H

## (undated) DEVICE — SUREFORM 45 CURVED-TIP: Brand: SUREFORM

## (undated) DEVICE — LOU THORACIC: Brand: MEDLINE INDUSTRIES, INC.

## (undated) DEVICE — APPL CHLORAPREP HI/LITE 26ML ORNG

## (undated) DEVICE — SUT SILK 0 PSL 18IN 580H

## (undated) DEVICE — PENCL SMOKE/EVAC MEGADYNE TELESCP 10FT

## (undated) DEVICE — SEAL

## (undated) DEVICE — GLV SURG PREMIERPRO ORTHO LTX PF SZ8 BRN

## (undated) DEVICE — GLV SURG SIGNATURE ESSENTIAL PF LTX SZ7.5

## (undated) DEVICE — SOL ANTISTICK CAUTRY ELECTROLUBE LF

## (undated) DEVICE — UNIVERSAL PACK: Brand: CARDINAL HEALTH

## (undated) DEVICE — TP UMB COTN 1/8X36 U12T

## (undated) DEVICE — BLADELESS OBTURATOR: Brand: WECK VISTA

## (undated) DEVICE — SYS PERFUS SEP PLATLT W TIPS CUST

## (undated) DEVICE — SYR LL TP 10ML STRL

## (undated) DEVICE — SUCTION IRRIGATOR: Brand: ENDOWRIST

## (undated) DEVICE — REDUCER: Brand: ENDOWRIST

## (undated) DEVICE — LAPAROSCOPIC SMOKE FILTRATION SYSTEM: Brand: PALL LAPAROSHIELD® PLUS LAPAROSCOPIC SMOKE FILTRATION SYSTEM

## (undated) DEVICE — SOL NACL 0.9PCT 1000ML

## (undated) DEVICE — KT CLN CLEANOR SCPE